# Patient Record
Sex: FEMALE | Employment: UNEMPLOYED | URBAN - METROPOLITAN AREA
[De-identification: names, ages, dates, MRNs, and addresses within clinical notes are randomized per-mention and may not be internally consistent; named-entity substitution may affect disease eponyms.]

---

## 2018-05-14 ENCOUNTER — HISTORICAL (OUTPATIENT)
Dept: ADMINISTRATIVE | Facility: HOSPITAL | Age: 48
End: 2018-05-14

## 2022-04-10 ENCOUNTER — HISTORICAL (OUTPATIENT)
Dept: ADMINISTRATIVE | Facility: HOSPITAL | Age: 52
End: 2022-04-10

## 2022-04-27 VITALS — WEIGHT: 92.81 LBS | BODY MASS INDEX: 18.22 KG/M2 | HEIGHT: 60 IN

## 2022-05-03 NOTE — HISTORICAL [facility]ER
This is a historical note converted from Bernard. Formatting and pictures may have been removed.  Please reference Bernard for original formatting and attached multimedia. Chief Complaint  Referral for R dist thumb fx  History of Present Illness  47-year-old right-hand-dominant female coming today for evaluation of her right thumb. ?She was moving boxes 2 weeks ago and sustained a crush injury to her distal thumb. ?She has a distal phalanx fracture. ?She was placed to a splint she took it off because it is getting very dirty.  ?  Of note she has rheumatoid arthritis and she has significant right wrist pain as well.? She underwent?wrist arthroscopy in the past and they just did an extensive debridement. ?She worked said that worked for a while but ultimately she is getting some significant pain back now. ?She is just inquiring on what her potential options were.  Review of Systems  Constitutional: No fevers, no chills, no night sweats,  Respiratory: Normal work of breathing  Cardiovascular: No chest pains  Gastrointestinal: no nausea, no vomiting  Musculoskeletal: See HPI  Integumentary: No rashes  Neurologic: Normal, atraumatic  Physical Exam  Vitals & Measurements  HT:?152?cm? HT:?152?cm? WT:?42.1?kg? WT:?42.1?kg? BMI:?18.22?  General: A+Ox3, NAD  Neck: No JVD  Respiratory: Normal work of breathing, Symmetric chest rise?  Cardiovascular: Regular rate and rhythm at radial pulse  Gastrointestinal: Soft, nontender, nondistended  Neurologic: CN II-XII intact  Musculoskeletal:  ?  RUE  Tenderness palpation of the distal phalanx of her right thumb  Full range of motion of remainder of the thumb  Limited wrist range of motion?to?20? flexion 15? extension about 40? supination 40? pronation  Neurovascular intact distally  Assessment/Plan  Finger fracture  Ordered:  Clinic Follow up, *Est. 06/11/18 3:00:00 CDT, Order for future visit, Finger fracture, [facility]  XR Hand Thumb Right Min 2 Views, Routine, *Est. 06/11/18  3:00:00 CDT, Follow Up Trauma, None, Ambulatory, Rad Type, Order for future visit, Finger fracture, *Est. 06/11/18 3:00:00 CDT  ?  Discussed all treatment on patient she expressed understanding. ?At this time we will place him into a splint for her thumb and she will come out daily for range of motion. ?She will be nonweightbearing for a period of 4 more weeks.? Additionally as far as her wrist is concerned at next visit we will attempt corticosteroid injection. ?After this I explained to her that her options really are wrist fusion versus arthroplasty and I think the convocation rate of arthroplasty is too high for us to consider that.? Therefore if her injections no longer work and she wants something done we can always do a total wrist fusion. ?She expressed understanding and is in agreement we will see her back in 4 weeks for repeat x-ray.   Problem List/Past Medical History  Ongoing  Arthritis  Tobacco user  Historical  No qualifying data  Procedure/Surgical History  Application of finger splint; dynamic (04/29/2018), Immobilization of Right Thumb using Splint (04/29/2018).  Medications  No active medications  Allergies  sulfa drugs?(blisters on tongue)  Social History  Alcohol  Current, 1-2 times per year, 04/29/2018  Substance Abuse  Never, 04/29/2018  Tobacco  Current every day smoker Use:. Cigarettes Type:. 10 per day. Started age 16 Years. Previous treatment: None. Ready to change: No. Household tobacco concerns: No., 05/14/2018  Current every day smoker, 04/29/2018  Diagnostic Results  Mildly comminuted?distal phalanx fracture of the thumb. ?Overall alignment is intact, joint line is intact and congruent  ?  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 ?As well as her midcarpal joint.      Gilma Edmonds?s?medical history, physical exam findings right hand, diagnosis, and treatment outlined by?.?Joshua?has been reviewed.??Treatment plan is determined to be reasonable and appropriate.?I was present  during the evaluation. X-rays right hand?reviewed. Smoking cessation is important.  ?

## 2024-04-06 DIAGNOSIS — S82.892A CLOSED FRACTURE DISLOCATION OF LEFT ANKLE, INITIAL ENCOUNTER: Primary | ICD-10-CM

## 2024-04-15 ENCOUNTER — HOSPITAL ENCOUNTER (EMERGENCY)
Facility: HOSPITAL | Age: 54
Discharge: HOME | End: 2024-04-15
Attending: INTERNAL MEDICINE
Payer: MEDICAID

## 2024-04-15 VITALS
HEART RATE: 94 BPM | SYSTOLIC BLOOD PRESSURE: 138 MMHG | DIASTOLIC BLOOD PRESSURE: 91 MMHG | TEMPERATURE: 98 F | RESPIRATION RATE: 20 BRPM | OXYGEN SATURATION: 99 % | BODY MASS INDEX: 21.91 KG/M2 | WEIGHT: 123.69 LBS | HEIGHT: 63 IN

## 2024-04-15 DIAGNOSIS — S82.842K CLOSED BIMALLEOLAR FRACTURE OF LEFT ANKLE WITH NONUNION, SUBSEQUENT ENCOUNTER: ICD-10-CM

## 2024-04-15 DIAGNOSIS — R74.8 ELEVATED LIVER ENZYMES: Primary | ICD-10-CM
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
## 2024-04-15 DIAGNOSIS — M79.662 PAIN AND SWELLING OF LEFT LOWER LEG: ICD-10-CM

## 2024-04-15 DIAGNOSIS — M79.89 PAIN AND SWELLING OF LEFT LOWER LEG: ICD-10-CM

## 2024-04-15 LAB
ALBUMIN SERPL-MCNC: 3.6 G/DL (ref 3.5–5)
ALBUMIN SERPL-MCNC: 3.9 G/DL (ref 3.5–5)
ALBUMIN/GLOB SERPL: 0.9 RATIO (ref 1.1–2)
ALBUMIN/GLOB SERPL: 1 RATIO (ref 1.1–2)
ALP SERPL-CCNC: 108 UNIT/L (ref 40–150)
ALP SERPL-CCNC: 120 UNIT/L (ref 40–150)
ALT SERPL-CCNC: 491 UNIT/L (ref 0–55)
ALT SERPL-CCNC: 493 UNIT/L (ref 0–55)
AMMONIA PLAS-MSCNC: 46.7 UMOL/L (ref 18–72)
APAP SERPL-MCNC: <17.4 UG/ML (ref 17.4–30)
APTT PPP: 33.6 SECONDS (ref 23.2–33.7)
AST SERPL-CCNC: 715 UNIT/L (ref 5–34)
AST SERPL-CCNC: 825 UNIT/L (ref 5–34)
BASOPHILS # BLD AUTO: 0.1 X10(3)/MCL
BASOPHILS NFR BLD AUTO: 0.8 %
BILIRUB SERPL-MCNC: 0.8 MG/DL
BILIRUB SERPL-MCNC: 1.2 MG/DL
BUN SERPL-MCNC: 14.6 MG/DL (ref 9.8–20.1)
BUN SERPL-MCNC: 17.5 MG/DL (ref 9.8–20.1)
CALCIUM SERPL-MCNC: 9.4 MG/DL (ref 8.4–10.2)
CALCIUM SERPL-MCNC: 9.9 MG/DL (ref 8.4–10.2)
CHLORIDE SERPL-SCNC: 106 MMOL/L (ref 98–107)
CHLORIDE SERPL-SCNC: 107 MMOL/L (ref 98–107)
CO2 SERPL-SCNC: 17 MMOL/L (ref 22–29)
CO2 SERPL-SCNC: 18 MMOL/L (ref 22–29)
CREAT SERPL-MCNC: 0.67 MG/DL (ref 0.55–1.02)
CREAT SERPL-MCNC: 0.72 MG/DL (ref 0.55–1.02)
CRP SERPL-MCNC: 31.2 MG/L
D DIMER PPP IA.FEU-MCNC: 1.85 UG/ML FEU (ref 0–0.5)
EOSINOPHIL # BLD AUTO: 0.33 X10(3)/MCL (ref 0–0.9)
EOSINOPHIL NFR BLD AUTO: 2.8 %
ERYTHROCYTE [DISTWIDTH] IN BLOOD BY AUTOMATED COUNT: 13.6 % (ref 11.5–17)
ERYTHROCYTE [SEDIMENTATION RATE] IN BLOOD: 18 MM/HR (ref 0–20)
GFR SERPLBLD CREATININE-BSD FMLA CKD-EPI: >60 MLS/MIN/1.73/M2
GFR SERPLBLD CREATININE-BSD FMLA CKD-EPI: >60 MLS/MIN/1.73/M2
GLOBULIN SER-MCNC: 3.8 GM/DL (ref 2.4–3.5)
GLOBULIN SER-MCNC: 4 GM/DL (ref 2.4–3.5)
GLUCOSE SERPL-MCNC: 110 MG/DL (ref 74–100)
GLUCOSE SERPL-MCNC: 140 MG/DL (ref 74–100)
HAV IGM SERPL QL IA: NONREACTIVE
HBV CORE IGM SERPL QL IA: NONREACTIVE
HBV SURFACE AG SERPL QL IA: NONREACTIVE
HCT VFR BLD AUTO: 45.6 % (ref 37–47)
HCV AB SERPL QL IA: NONREACTIVE
HGB BLD-MCNC: 16.3 G/DL (ref 12–16)
HOLD SPECIMEN: NORMAL
IMM GRANULOCYTES # BLD AUTO: 0.08 X10(3)/MCL (ref 0–0.04)
IMM GRANULOCYTES NFR BLD AUTO: 0.7 %
INR PPP: 1
LACTATE SERPL-SCNC: 0.8 MMOL/L (ref 0.5–2.2)
LIPASE SERPL-CCNC: 24 U/L
LYMPHOCYTES # BLD AUTO: 1.44 X10(3)/MCL (ref 0.6–4.6)
LYMPHOCYTES NFR BLD AUTO: 12.2 %
MCH RBC QN AUTO: 35.7 PG (ref 27–31)
MCHC RBC AUTO-ENTMCNC: 35.7 G/DL (ref 33–36)
MCV RBC AUTO: 99.8 FL (ref 80–94)
MONOCYTES # BLD AUTO: 0.74 X10(3)/MCL (ref 0.1–1.3)
MONOCYTES NFR BLD AUTO: 6.3 %
NEUTROPHILS # BLD AUTO: 9.09 X10(3)/MCL (ref 2.1–9.2)
NEUTROPHILS NFR BLD AUTO: 77.2 %
NRBC BLD AUTO-RTO: 0 %
PLATELET # BLD AUTO: 438 X10(3)/MCL (ref 130–400)
PMV BLD AUTO: 9.7 FL (ref 7.4–10.4)
POTASSIUM SERPL-SCNC: 3.8 MMOL/L (ref 3.5–5.1)
POTASSIUM SERPL-SCNC: 4 MMOL/L (ref 3.5–5.1)
PROT SERPL-MCNC: 7.6 GM/DL (ref 6.4–8.3)
PROT SERPL-MCNC: 7.7 GM/DL (ref 6.4–8.3)
PROTHROMBIN TIME: 12.7 SECONDS (ref 11.4–14)
RBC # BLD AUTO: 4.57 X10(6)/MCL (ref 4.2–5.4)
SALICYLATES SERPL-MCNC: <5 MG/DL (ref 15–30)
SODIUM SERPL-SCNC: 136 MMOL/L (ref 136–145)
SODIUM SERPL-SCNC: 137 MMOL/L (ref 136–145)
WBC # SPEC AUTO: 11.78 X10(3)/MCL (ref 4.5–11.5)

## 2024-04-15 PROCEDURE — 86140 C-REACTIVE PROTEIN: CPT | Performed by: PHYSICIAN ASSISTANT

## 2024-04-15 PROCEDURE — 80053 COMPREHEN METABOLIC PANEL: CPT | Mod: 91 | Performed by: INTERNAL MEDICINE

## 2024-04-15 PROCEDURE — 80053 COMPREHEN METABOLIC PANEL: CPT | Performed by: PHYSICIAN ASSISTANT

## 2024-04-15 PROCEDURE — 83605 ASSAY OF LACTIC ACID: CPT | Performed by: INTERNAL MEDICINE

## 2024-04-15 PROCEDURE — 85652 RBC SED RATE AUTOMATED: CPT | Performed by: PHYSICIAN ASSISTANT

## 2024-04-15 PROCEDURE — 83690 ASSAY OF LIPASE: CPT | Performed by: INTERNAL MEDICINE

## 2024-04-15 PROCEDURE — 80143 DRUG ASSAY ACETAMINOPHEN: CPT | Performed by: PHYSICIAN ASSISTANT

## 2024-04-15 PROCEDURE — 99284 EMERGENCY DEPT VISIT MOD MDM: CPT | Mod: 25

## 2024-04-15 PROCEDURE — 82140 ASSAY OF AMMONIA: CPT | Performed by: INTERNAL MEDICINE

## 2024-04-15 PROCEDURE — 25000003 PHARM REV CODE 250: Performed by: INTERNAL MEDICINE

## 2024-04-15 PROCEDURE — 85730 THROMBOPLASTIN TIME PARTIAL: CPT | Performed by: INTERNAL MEDICINE

## 2024-04-15 PROCEDURE — 80179 DRUG ASSAY SALICYLATE: CPT | Performed by: INTERNAL MEDICINE

## 2024-04-15 PROCEDURE — 85025 COMPLETE CBC W/AUTO DIFF WBC: CPT | Performed by: PHYSICIAN ASSISTANT

## 2024-04-15 PROCEDURE — 29515 APPLICATION SHORT LEG SPLINT: CPT | Mod: LT

## 2024-04-15 PROCEDURE — 25000003 PHARM REV CODE 250: Performed by: PHYSICIAN ASSISTANT

## 2024-04-15 PROCEDURE — 80074 ACUTE HEPATITIS PANEL: CPT | Performed by: PHYSICIAN ASSISTANT

## 2024-04-15 PROCEDURE — 63600175 PHARM REV CODE 636 W HCPCS: Performed by: INTERNAL MEDICINE

## 2024-04-15 PROCEDURE — 85610 PROTHROMBIN TIME: CPT | Performed by: INTERNAL MEDICINE

## 2024-04-15 PROCEDURE — 96365 THER/PROPH/DIAG IV INF INIT: CPT

## 2024-04-15 PROCEDURE — 85379 FIBRIN DEGRADATION QUANT: CPT | Performed by: PHYSICIAN ASSISTANT

## 2024-04-15 RX ORDER — OXYCODONE AND ACETAMINOPHEN 5; 325 MG/1; MG/1
1 TABLET ORAL
Status: COMPLETED | OUTPATIENT
Start: 2024-04-15 | End: 2024-04-15

## 2024-04-15 RX ORDER — OXYCODONE HYDROCHLORIDE 5 MG/1
5 TABLET ORAL EVERY 6 HOURS PRN
Qty: 12 TABLET | Refills: 0 | OUTPATIENT
Start: 2024-04-15 | End: 2024-04-30

## 2024-04-15 RX ORDER — OXYCODONE HYDROCHLORIDE 5 MG/1
5 TABLET ORAL
Status: COMPLETED | OUTPATIENT
Start: 2024-04-15 | End: 2024-04-15

## 2024-04-15 RX ADMIN — OXYCODONE HYDROCHLORIDE AND ACETAMINOPHEN 1 TABLET: 5; 325 TABLET ORAL at 05:04

## 2024-04-15 RX ADMIN — ACETYLCYSTEINE 8400 MG: 200 INJECTION, SOLUTION INTRAVENOUS at 07:04

## 2024-04-15 RX ADMIN — OXYCODONE HYDROCHLORIDE 5 MG: 5 TABLET ORAL at 10:04

## 2024-04-15 NOTE — ED PROVIDER NOTES
Encounter Date: 4/15/2024       History     Chief Complaint   Patient presents with    Foot Pain     Pt c/o foot pain after fall 1 week ago.  Seen at [facility]..  left leg in short splint.  Pt waiting on referral appointment from our ortho clinic.  Reports falling again with splint on 3 days ago.     52 YO WF in ER with complaints of continued left foot/ankle pain after she had a fracture/dislocation about 1 week ago at [facility] ER. She states 3 days ago she fell and put weight on the left leg and thinks she may have broken something else. She did remove her splint and put it back on. Has been taking a lot of Tylenol and ibuprofen along with her pain meds from the ER. States she is taking 2 Tylenol tablets every 2-2.5 hours. Also having pain behind her left knee. No other complaints.     The history is provided by the patient and a friend.     Review of patient's allergies indicates:   Allergen Reactions    Sulfamethoxazole-trimethoprim Hives     On tongue     No past medical history on file.  No past surgical history on file.  No family history on file.     Review of Systems   Constitutional:  Negative for chills and fever.   HENT:  Negative for congestion and sore throat.    Respiratory:  Negative for shortness of breath.    Cardiovascular:  Negative for chest pain.   Gastrointestinal:  Negative for abdominal pain, diarrhea, nausea and vomiting.   Genitourinary:  Negative for dysuria.   Musculoskeletal:  Positive for arthralgias, joint swelling and myalgias. Negative for back pain.   Skin:  Negative for rash.   Neurological:  Negative for dizziness, weakness, light-headedness and headaches.   Hematological:  Does not bruise/bleed easily.   All other systems reviewed and are negative.      Physical Exam     Initial Vitals [04/15/24 1552]   BP Pulse Resp Temp SpO2   114/68 (!) 118 18 97.8 °F (36.6 °C) 98 %      MAP       --         Physical Exam    Nursing note and vitals reviewed.  Constitutional: She appears well-developed  and well-nourished. She is not diaphoretic. No distress.   HENT:   Head: Normocephalic and atraumatic.   Mouth/Throat: Oropharynx is clear and moist.   Eyes: Conjunctivae are normal.   Neck: Neck supple. No JVD present.   Normal range of motion.  Cardiovascular:  Normal rate, regular rhythm and normal heart sounds.           Pulmonary/Chest: Breath sounds normal.   Musculoskeletal:      Cervical back: Normal range of motion and neck supple.      Left lower leg: Swelling and tenderness present. 1+ Pitting Edema present.      Comments: Significant bruising and swelling to LLE including foot, splint removed     Neurological: She is alert and oriented to person, place, and time. GCS score is 15. GCS eye subscore is 4. GCS verbal subscore is 5. GCS motor subscore is 6.   Skin: Skin is warm and dry.   Psychiatric: She has a normal mood and affect.         ED Course   Procedures  Labs Reviewed   COMPREHENSIVE METABOLIC PANEL - Abnormal; Notable for the following components:       Result Value    Carbon Dioxide 17 (*)     Glucose Level 110 (*)     Globulin 3.8 (*)     Albumin/Globulin Ratio 1.0 (*)     Alanine Aminotransferase 493 (*)     Aspartate Aminotransferase 825 (*)     All other components within normal limits   D DIMER, QUANTITATIVE - Abnormal; Notable for the following components:    D-Dimer 1.85 (*)     All other components within normal limits   CBC WITH DIFFERENTIAL - Abnormal; Notable for the following components:    WBC 11.78 (*)     Hgb 16.3 (*)     MCV 99.8 (*)     MCH 35.7 (*)     Platelet 438 (*)     IG# 0.08 (*)     All other components within normal limits   C-REACTIVE PROTEIN - Abnormal; Notable for the following components:    C-Reactive Protein 31.20 (*)     All other components within normal limits   ACETAMINOPHEN LEVEL - Abnormal; Notable for the following components:    Acetaminophen Level <17.4 (*)     All other components within normal limits   SALICYLATE LEVEL - Abnormal; Notable for the  following components:    Salicylate Level <5.0 (*)     All other components within normal limits   COMPREHENSIVE METABOLIC PANEL - Abnormal; Notable for the following components:    Carbon Dioxide 18 (*)     Glucose Level 140 (*)     Globulin 4.0 (*)     Albumin/Globulin Ratio 0.9 (*)     Alanine Aminotransferase 491 (*)     Aspartate Aminotransferase 715 (*)     All other components within normal limits   HEPATITIS PANEL, ACUTE - Normal   SEDIMENTATION RATE, AUTOMATED - Normal   PROTIME-INR - Normal   APTT - Normal   LIPASE - Normal   AMMONIA - Normal   LACTIC ACID, PLASMA - Normal   CBC W/ AUTO DIFFERENTIAL    Narrative:     The following orders were created for panel order CBC auto differential.  Procedure                               Abnormality         Status                     ---------                               -----------         ------                     CBC with Differential[[id number]]       Abnormal            Final result                 Please view results for these tests on the individual orders.   EXTRA TUBES    Narrative:     The following orders were created for panel order EXTRA TUBES.  Procedure                               Abnormality         Status                     ---------                               -----------         ------                     Gold Top Hold[[id number]]                                   Final result                 Please view results for these tests on the individual orders.   GOLD TOP HOLD   EXTRA TUBES    Narrative:     The following orders were created for panel order EXTRA TUBES.  Procedure                               Abnormality         Status                     ---------                               -----------         ------                     Light Blue Top Hold[[id number]]                             Final result               Light Green Top Hold[[id number]]                            Final result               Lavender Top Hold[[id number]]                                Final result               Gold Top Hold[[id number]]                                   Final result                 Please view results for these tests on the individual orders.   LIGHT BLUE TOP HOLD   LIGHT GREEN TOP HOLD   LAVENDER TOP HOLD   GOLD TOP HOLD          Imaging Results              X-Ray Ankle Complete Left (Final result)  Result time 04/15/24 17:43:00      Final result by Ezra Meeks MD (04/15/24 17:43:00)                   Impression:      Acute fracture of the distal tibial metaphysis extending to the medial malleolus and a fracture of the lateral malleolus. There is orthopedic hardware seen along the medial malleolus which is old.  The fractures are acute.      Electronically signed by: Monico Meeks  Date:    04/15/2024  Time:    17:43               Narrative:    EXAMINATION:  XR ANKLE COMPLETE 3 VIEW LEFT    CLINICAL HISTORY:  Unspecified fall, initial encounter    TECHNIQUE:  AP, lateral and oblique views were performed.    COMPARISON:  02/12/2015    FINDINGS:  There is an acute fracture of the distal tibial metaphysis extending to the medial malleolus with slight displacement seen. There are orthopedic screws the seen in this region from a old surgery that was also seen in 2015. The fractures are acute however.  There is also fracture of the lateral malleolus.  There is diffuse soft tissue swelling in the foot and ankle.  No other fractures are seen.                                       X-Ray Foot Complete Left (Final result)  Result time 04/15/24 17:41:56      Final result by Ezra Meeks MD (04/15/24 17:41:56)                   Impression:      Acute fracture of the distal tibial metaphysis extending to the medial malleolus and a fracture of the lateral malleolus.  There is orthopedic hardware seen along the medial malleolus which is old.  The fractures are acute.      Electronically signed by: Monico Meeks  Date:    04/15/2024  Time:    17:41                Narrative:    EXAMINATION:  XR FOOT COMPLETE 3 VIEW LEFT    CLINICAL HISTORY:  .  Unspecified fall, initial encounter    TECHNIQUE:  AP, lateral and oblique views of the left foot were performed.    COMPARISON:  02/12/2015    FINDINGS:  There is an acute fracture of the distal tibial metaphysis extending to the medial malleolus with slight displacement seen.  There are orthopedic screws the seen in this region from a old surgery that was also seen in 2015.  The fractures are acute however.  There is also fracture of the lateral malleolus.  There is diffuse soft tissue swelling in the foot and ankle.  No other fractures are seen.                                       X-Ray Tibia Fibula 2 View Left (Final result)  Result time 04/15/24 17:42:25      Final result by Ezra Meeks MD (04/15/24 17:42:25)                   Impression:      Acute fracture of the distal tibial metaphysis extending to the medial malleolus and a fracture of the lateral malleolus. There is orthopedic hardware seen along the medial malleolus which is old.  The fractures are acute.      Electronically signed by: Monico Meeks  Date:    04/15/2024  Time:    17:42               Narrative:    EXAMINATION:  XR TIBIA FIBULA 2 VIEW LEFT    CLINICAL HISTORY:  Unspecified fall, initial encounter    TECHNIQUE:  AP and lateral views of the left tibia and fibula were performed.    COMPARISON:  02/12/2015    FINDINGS:  There is an acute fracture of the distal tibial metaphysis extending to the medial malleolus with slight displacement seen. There are orthopedic screws the seen in this region from a old surgery that was also seen in 2015. The fractures are acute however.  There is also fracture of the lateral malleolus.  There is diffuse soft tissue swelling in the foot and ankle.  No other fractures are seen.                                       Medications   oxyCODONE-acetaminophen 5-325 mg per tablet 1 tablet (1 tablet Oral Given  4/15/24 1754)   acetylcysteine 20% (200 mg/ml) (ACETADOTE) 8,400 mg in dextrose 5 % (D5W) 250 mL infusion (0 mg Intravenous Stopped 4/15/24 2043)   oxyCODONE immediate release tablet 5 mg (5 mg Oral Given 4/15/24 2234)     Medical Decision Making  Amount and/or Complexity of Data Reviewed  External Data Reviewed: radiology.     Details: IMPRESSION:  An oblique nondisplaced fracture of the lateral malleolus is demonstrated.  The patient is status post ORIF of the distal medial tibial fracture. A vertical fracture line is demonstrated just medial to the tips of the fixation screws. The screws do not completely span across fracture line.    Electronically signed by: Fadi Delgado MD on 04/07/2024 02:47:28 AM /Eastern  Per PQRS, all CT exams are performed using one or more of the following dose reduction techniques: automated exposure control, adjustment of the mA and/or kV according to patient size, or use of iterative reconstruction technique.  Exam End: 04/07/24 01:22 Last Resulted: 04/07/24 01:47  [facility]  Result Received: 04/15/24 15:46      Labs: ordered. Decision-making details documented in ED Course.  Radiology: ordered and independent interpretation performed. Decision-making details documented in ED Course.    Risk  Prescription drug management.      Additional MDM:   Differential Diagnosis:   Differential diagnosis includes fracture, sprain, strain, contusion among others               Attending Attestation:     Physician Attestation Statement for NP/PA:   I personally made/approved the management plan and take responsibility for the patient management.    Other NP/PA Attestation Additions:    History of Present Illness: Presents with left ankle pain after a fall with resulting fracture. Evaluated at [facility] and referred to us due to health insurance difficulties. Pt states prior fracture with 2 screws  in it.   Upon HPI pt reports using Tylenol more frequently than recommended, also has history of frequent alcohol use.    Physical Exam: Left ankle swelling with bruising, N-V intact. Old splint removed and replaced. CRF < 2 sec.   Medical Decision Making: Transaminases noticed in a ration 2:1; Tylenol level within normal level as well as lactic acid and ammonia. Repeat liver profile w/o significant change. With the initial abnormal liver profile and before tylenol level reported a dose of Mucomyst was given assuming tylenol toxicity but upon further investigation and results this is more consistent with alcoholic liver disease. Pt asymptomatic, no nausea, vomiting or confusion.  Instructions to discontinue tylenol, decrease alcohol intake and adequate follow up with gastroenterology and internal medicine services given, adequate referrals submitted to F/U liver disease. Adequate Ortho referral also placed. Strict ED precautions provided.             ED Course as of 04/15/24 2255   Mon Apr 15, 2024   1900 Liver enzymes elevated, more than likely due to Tylenol intake, pt states she also has a hx of heavy alcohol use about 2 weeks ago, case discussed with Dr. Carlin and will workup for tylenol overdose, coag labs ordered, pt aware, care will be transitioned to Dr. Carlin at this time [TT]      ED Course User Index  [TT] Vj Harden PA                             Clinical Impression:  Final diagnoses:  [W19.XXXA] Fall  [M79.662, M79.89] Pain and swelling of left lower leg  [R74.8] Elevated liver enzymes (Primary)  [S82.842K] Closed bimalleolar fracture of left ankle with nonunion, subsequent encounter          ED Disposition Condition    Discharge Fair          ED Prescriptions       Medication Sig Dispense Start Date End Date Auth. Provider    oxyCODONE (ROXICODONE) 5 MG immediate release tablet Take 1 tablet (5 mg total) by mouth every 6 (six) hours as needed for Pain. 12 tablet 4/15/2024 -- Hugo Patrick  MD ANKUR          Follow-up Information       Follow up With Specialties Details Why Contact Info Additional Information    [facility] Dept Emergency Medicine  If symptoms worsen [address] [address] [phone]     [facility] G[facility] Gastroenterology Schedule an appointment as soon as possible for a visit in 1 month  [address] [address] [phone] Entrance 1 for clinic visits If your appointment is a fibroscan, please present to GI Lab on 5th Floor.     [facility] Internal Medicine Internal Medicine Schedule an appointment as soon as possible for a visit in 1 month  [address] [facility] [address] [phone] Internal Medicine Clinic Entrance #1    [facility] Orth[facility] Orthopedics Schedule an appointment as soon as possible for a visit in 2 weeks  [address] [facility] [id number] [phone]              Hugo Patrick MD  04/15/24 2255       Hugo Patrick MD  04/15/24 225

## 2024-04-16 NOTE — PROGRESS NOTES
[facility]  NEW Patient Office Visit  04/16/2024       Patient ID: Gilma Edmonds  YOB: 1970  MRN: [id number]    Chief Complaint: No chief complaint on file.      Orthopaedic Injuries:   Remote hx L ankle medial mal fx w/ retained hardware   L pilon fracture dislocation, associated distal fibula fx (DOI 4/6/23)    Orthopaedic Surgeries: pending    HPI:  Gilma Edmonds is a 53 y.o. female with above. She presents as a new patient.  Trip and fall.  She has a 1 pack per day current smoker.  Medial malleolus hardware is from 2014, outside hospital.    12 point ROS performed and negative except as above.     Past Medical History:    No past medical history on file.  No past surgical history on file.  No family history on file.  Social History     Socioeconomic History    Marital status: Single     Medication List with Changes/Refills   Current Medications    OXYCODONE (ROXICODONE) 5 MG IMMEDIATE RELEASE TABLET    Take 1 tablet (5 mg total) by mouth every 6 (six) hours as needed for Pain.     Review of patient's allergies indicates:   Allergen Reactions    Sulfamethoxazole-trimethoprim Hives     On tongue       Physical Exam:  Left lower extremity  Swollen, no skin wrinkles  Resolving bruising  She has a posterior fracture blister  Fires TA/GS/EHL/FHL  Toes well perfused    Imaging independently interpreted:  X-ray left ankle:  She is retained 2 medial malleolus screws, there is a large vertically oriented medial mal fracture that extends into the plafond, distal fibula fracture    Assessment and Plan:    Gilma Edmonds is a 53 y.o. female with left pilon fracture, retained orthopedic hardware.    -too swollen currently, we will book for ORIF left ankle, left ankle hardware removal, we will plan to stress the syndesmosis, possible syndesmosis fixation-04/30/2024  -given that she is taken her splint off previously she was placed into a short-leg cast today  -smoking  cessation    WB Status:  Nonweightbearing left lower extremity  Follow up:  Surgery 4/30    Sherrill Zelaya MD  [employer] [facility] PGY3

## 2024-04-16 NOTE — H&P (VIEW-ONLY)
[facility]  NEW Patient Office Visit  04/16/2024       Patient ID: Gilma Edmonds  YOB: 1970  MRN: [id number]    Chief Complaint: No chief complaint on file.      Orthopaedic Injuries:   Remote hx L ankle medial mal fx w/ retained hardware   L pilon fracture dislocation, associated distal fibula fx (DOI 4/6/23)    Orthopaedic Surgeries: pending    HPI:  Gilma Edmonds is a 53 y.o. female with above. She presents as a new patient.  Trip and fall.  She has a 1 pack per day current smoker.  Medial malleolus hardware is from 2014, outside hospital.    12 point ROS performed and negative except as above.     Past Medical History:    No past medical history on file.  No past surgical history on file.  No family history on file.  Social History     Socioeconomic History    Marital status: Single     Medication List with Changes/Refills   Current Medications    OXYCODONE (ROXICODONE) 5 MG IMMEDIATE RELEASE TABLET    Take 1 tablet (5 mg total) by mouth every 6 (six) hours as needed for Pain.     Review of patient's allergies indicates:   Allergen Reactions    Sulfamethoxazole-trimethoprim Hives     On tongue       Physical Exam:  Left lower extremity  Swollen, no skin wrinkles  Resolving bruising  She has a posterior fracture blister  Fires TA/GS/EHL/FHL  Toes well perfused    Imaging independently interpreted:  X-ray left ankle:  She is retained 2 medial malleolus screws, there is a large vertically oriented medial mal fracture that extends into the plafond, distal fibula fracture    Assessment and Plan:    Gilma Edmonds is a 53 y.o. female with left pilon fracture, retained orthopedic hardware.    -too swollen currently, we will book for ORIF left ankle, left ankle hardware removal, we will plan to stress the syndesmosis, possible syndesmosis fixation-04/30/2024  -given that she is taken her splint off previously she was placed into a short-leg cast today  -smoking  cessation    WB Status:  Nonweightbearing left lower extremity  Follow up:  Surgery 4/30    Sherrill Zelaya MD  [employer] [facility] PGY3

## 2024-04-17 ENCOUNTER — OFFICE VISIT (OUTPATIENT)
Dept: ORTHOPEDICS | Facility: CLINIC | Age: 54
End: 2024-04-17
Payer: MEDICAID

## 2024-04-17 ENCOUNTER — HOSPITAL ENCOUNTER (OUTPATIENT)
Dept: RADIOLOGY | Facility: HOSPITAL | Age: 54
Discharge: HOME | End: 2024-04-17
Attending: INTERNAL MEDICINE
Payer: MEDICAID

## 2024-04-17 ENCOUNTER — HOSPITAL ENCOUNTER (OUTPATIENT)
Dept: RADIOLOGY | Facility: HOSPITAL | Age: 54
Discharge: HOME | End: 2024-04-17
Attending: STUDENT IN AN ORGANIZED HEALTH CARE EDUCATION/TRAINING PROGRAM
Payer: MEDICAID

## 2024-04-17 VITALS
SYSTOLIC BLOOD PRESSURE: 114 MMHG | DIASTOLIC BLOOD PRESSURE: 77 MMHG | HEIGHT: 63 IN | WEIGHT: 126 LBS | HEART RATE: 94 BPM | BODY MASS INDEX: 22.32 KG/M2

## 2024-04-17 DIAGNOSIS — S82.872A CLOSED DISPLACED PILON FRACTURE OF LEFT TIBIA, INITIAL ENCOUNTER: ICD-10-CM

## 2024-04-17 DIAGNOSIS — S82.842K CLOSED BIMALLEOLAR FRACTURE OF LEFT ANKLE WITH NONUNION, SUBSEQUENT ENCOUNTER: ICD-10-CM

## 2024-04-17 DIAGNOSIS — R74.8 ELEVATED LIVER ENZYMES: ICD-10-CM

## 2024-04-17 DIAGNOSIS — S82.842K CLOSED BIMALLEOLAR FRACTURE OF LEFT ANKLE WITH NONUNION, SUBSEQUENT ENCOUNTER: Primary | ICD-10-CM

## 2024-04-17 PROCEDURE — 99204 OFFICE O/P NEW MOD 45 MIN: CPT | Mod: S$PBB,,, | Performed by: ORTHOPAEDIC SURGERY

## 2024-04-17 PROCEDURE — 99213 OFFICE O/P EST LOW 20 MIN: CPT | Mod: PBBFAC,25

## 2024-04-17 PROCEDURE — 76705 ECHO EXAM OF ABDOMEN: CPT | Mod: TC

## 2024-04-17 PROCEDURE — 73610 X-RAY EXAM OF ANKLE: CPT | Mod: TC,LT

## 2024-04-17 RX ORDER — MUPIROCIN 20 MG/G
OINTMENT TOPICAL
Status: CANCELLED | OUTPATIENT
Start: 2024-04-17

## 2024-04-17 RX ORDER — GABAPENTIN 300 MG/1
300 CAPSULE ORAL 3 TIMES DAILY
Qty: 90 CAPSULE | Refills: 11 | OUTPATIENT
Start: 2024-04-17 | End: 2024-04-30

## 2024-04-17 RX ORDER — OXYCODONE HYDROCHLORIDE 5 MG/1
5 TABLET ORAL EVERY 8 HOURS PRN
Qty: 21 TABLET | Refills: 0 | Status: SHIPPED | OUTPATIENT
Start: 2024-04-17 | End: 2024-04-24 | Stop reason: SDUPTHER

## 2024-04-17 RX ORDER — SODIUM CHLORIDE 9 MG/ML
INJECTION, SOLUTION INTRAVENOUS CONTINUOUS
Status: CANCELLED | OUTPATIENT
Start: 2024-04-17

## 2024-04-17 NOTE — PROGRESS NOTES
Faculty Attestation: Gilma Edmonds  was seen at [facility] in the Orthopaedic Clinic. Patient seen and evaluated at the time of the visit. History of Present Illness, Physical Exam, and Assessment and Plan reviewed. Treatment plan is reasonable and appropriate. Compliance with treatment recommendations is important. No procedure was performed.  We will plan for surgical intervention when soft tissues allow.      Markell King MD  Orthopaedic Surgery

## 2024-04-19 ENCOUNTER — ANESTHESIA EVENT (OUTPATIENT)
Dept: SURGERY | Facility: HOSPITAL | Age: 54
End: 2024-04-19
Payer: MEDICAID

## 2024-04-19 NOTE — ANESTHESIA PREPROCEDURE EVALUATION
Gilma Edmonds is a 53 y.o. female PRESENTING FOR ORIF, FRACTURE, PILON (Left: Leg Upper) with a history of   -FX L ANKLE/L PILON FX AFTER FALL 4/6/24    -SMOKER  -SIGNIFICANTLY ELEVATED LFTS 4/15/24 INITIALLY THOUGHT TO BE INDUCED BY EXCESSIVE TYLENOL INTAKE, BUT LATER ATTRIBUTED TO ETOH USE  -ETOH ABUSE  -FATTY LIVER    BETA-BLOCKER: NONE    New Orders for Anesthesia: CMP, PT/INR    Active Ambulatory Problems     Diagnosis Date Noted    No Active Ambulatory Problems     Resolved Ambulatory Problems     Diagnosis Date Noted    No Resolved Ambulatory Problems     Past Medical History:   Diagnosis Date    Arthritis      Pre-op Assessment    I have reviewed the NPO Status.      Review of Systems  Anesthesia Hx:  No problems with previous Anesthesia                Social:  Smoker, Alcohol Use       Cardiovascular:  Cardiovascular Normal                                            Pulmonary:  Pulmonary Normal                       Renal/:  Renal/ Normal                 Hepatic/GI:      Liver Disease,            Neurological:  Neurology Normal                                      Endocrine:  Endocrine Normal              Vitals:    04/30/24 0837 04/30/24 0841 04/30/24 0935 04/30/24 1043   BP:  123/86  122/72   Pulse:  93  88   Resp:   20 20   Temp:  36.3 °C (97.4 °F)  36 °C (96.8 °F)   TempSrc:  Oral  Temporal   SpO2:  96%  98%   Weight: 53.3 kg (117 lb 9.6 oz)            Physical Exam  General: Alert, Cooperative and Well nourished    Airway:  Mallampati: II   Mouth Opening: Normal  TM Distance: Normal  Tongue: Normal  Neck ROM: Normal ROM    Dental:  Intact    Chest/Lungs:  Normal Respiratory Rate, Clear to auscultation    Heart:  Rate: Normal  Rhythm: Regular Rhythm  Sounds: Normal      Lab Results   Component Value Date    WBC 11.78 (H) 04/15/2024    HGB 16.3 (H) 04/15/2024    HCT 45.6 04/15/2024    MCV 99.8 (H) 04/15/2024     (H) 04/15/2024     CMP  Sodium Level   Date Value Ref Range Status   04/30/2024  139 136 - 145 mmol/L Final     Potassium Level   Date Value Ref Range Status   04/30/2024 3.8 3.5 - 5.1 mmol/L Final     Carbon Dioxide   Date Value Ref Range Status   04/30/2024 20 (L) 22 - 29 mmol/L Final     Blood Urea Nitrogen   Date Value Ref Range Status   04/30/2024 14.3 9.8 - 20.1 mg/dL Final     Creatinine   Date Value Ref Range Status   04/30/2024 0.76 0.55 - 1.02 mg/dL Final     Calcium Level Total   Date Value Ref Range Status   04/30/2024 10.1 8.4 - 10.2 mg/dL Final     Albumin Level   Date Value Ref Range Status   04/30/2024 3.9 3.5 - 5.0 g/dL Final     Bilirubin Total   Date Value Ref Range Status   04/30/2024 0.4 <=1.5 mg/dL Final     Alkaline Phosphatase   Date Value Ref Range Status   04/30/2024 102 40 - 150 unit/L Final     Aspartate Aminotransferase   Date Value Ref Range Status   04/30/2024 25 5 - 34 unit/L Final     Alanine Aminotransferase   Date Value Ref Range Status   04/30/2024 27 0 - 55 unit/L Final     eGFR   Date Value Ref Range Status   04/30/2024 >60 mls/min/1.73/m2 Final     Lab Results   Component Value Date    INR 1.0 04/30/2024    INR 1.0 04/15/2024    PROTIME 12.8 04/30/2024    PROTIME 12.7 04/15/2024             Anesthesia Plan  Type of Anesthesia, risks & benefits discussed:    Anesthesia Type: Gen Supraglottic Airway, Regional  Intra-op Monitoring Plan: Standard ASA Monitors  Post Op Pain Control Plan: IV/PO Opioids PRN  Induction:  IV  Airway Plan: Direct  Informed Consent: Informed consent signed with the Patient and all parties understand the risks and agree with anesthesia plan.  All questions answered.   ASA Score: 3  Day of Surgery Review of History & Physical: H&P Update referred to the surgeon/provider.    Ready For Surgery From Anesthesia Perspective.     .

## 2024-04-24 ENCOUNTER — TELEPHONE (OUTPATIENT)
Dept: ORTHOPEDICS | Facility: CLINIC | Age: 54
End: 2024-04-24
Payer: MEDICAID

## 2024-04-24 DIAGNOSIS — S82.842K CLOSED BIMALLEOLAR FRACTURE OF LEFT ANKLE WITH NONUNION, SUBSEQUENT ENCOUNTER: ICD-10-CM

## 2024-04-24 RX ORDER — OXYCODONE HYDROCHLORIDE 5 MG/1
5 TABLET ORAL EVERY 12 HOURS PRN
Qty: 10 TABLET | Refills: 0 | OUTPATIENT
Start: 2024-04-24 | End: 2024-04-30

## 2024-04-24 NOTE — TELEPHONE ENCOUNTER
Patient calling asking for a refill of pain medication     Can you please send it to [facility]

## 2024-04-30 ENCOUNTER — ANESTHESIA (OUTPATIENT)
Dept: SURGERY | Facility: HOSPITAL | Age: 54
End: 2024-04-30
Payer: MEDICAID

## 2024-04-30 ENCOUNTER — HOSPITAL ENCOUNTER (OUTPATIENT)
Facility: HOSPITAL | Age: 54
Discharge: HOME | End: 2024-04-30
Attending: ORTHOPAEDIC SURGERY | Admitting: ORTHOPAEDIC SURGERY
Payer: MEDICAID

## 2024-04-30 VITALS
RESPIRATION RATE: 20 BRPM | WEIGHT: 117.63 LBS | HEART RATE: 83 BPM | BODY MASS INDEX: 20.83 KG/M2 | OXYGEN SATURATION: 96 % | DIASTOLIC BLOOD PRESSURE: 87 MMHG | TEMPERATURE: 98 F | SYSTOLIC BLOOD PRESSURE: 140 MMHG

## 2024-04-30 DIAGNOSIS — S82.872A CLOSED DISPLACED PILON FRACTURE OF LEFT TIBIA, INITIAL ENCOUNTER: ICD-10-CM

## 2024-04-30 DIAGNOSIS — S82.842K CLOSED BIMALLEOLAR FRACTURE OF LEFT ANKLE WITH NONUNION, SUBSEQUENT ENCOUNTER: ICD-10-CM

## 2024-04-30 LAB
ALBUMIN SERPL-MCNC: 3.9 G/DL (ref 3.5–5)
ALBUMIN/GLOB SERPL: 0.9 RATIO (ref 1.1–2)
ALP SERPL-CCNC: 102 UNIT/L (ref 40–150)
ALT SERPL-CCNC: 27 UNIT/L (ref 0–55)
AST SERPL-CCNC: 25 UNIT/L (ref 5–34)
BILIRUB SERPL-MCNC: 0.4 MG/DL
BUN SERPL-MCNC: 14.3 MG/DL (ref 9.8–20.1)
CALCIUM SERPL-MCNC: 10.1 MG/DL (ref 8.4–10.2)
CHLORIDE SERPL-SCNC: 107 MMOL/L (ref 98–107)
CO2 SERPL-SCNC: 20 MMOL/L (ref 22–29)
CREAT SERPL-MCNC: 0.76 MG/DL (ref 0.55–1.02)
GFR SERPLBLD CREATININE-BSD FMLA CKD-EPI: >60 MLS/MIN/1.73/M2
GLOBULIN SER-MCNC: 4.2 GM/DL (ref 2.4–3.5)
GLUCOSE SERPL-MCNC: 123 MG/DL (ref 74–100)
INR PPP: 1
POTASSIUM SERPL-SCNC: 3.8 MMOL/L (ref 3.5–5.1)
PROT SERPL-MCNC: 8.1 GM/DL (ref 6.4–8.3)
PROTHROMBIN TIME: 12.8 SECONDS (ref 11.4–14)
SODIUM SERPL-SCNC: 139 MMOL/L (ref 136–145)

## 2024-04-30 PROCEDURE — D9220A PRA ANESTHESIA: Mod: CRNA,,, | Performed by: NURSE ANESTHETIST, CERTIFIED REGISTERED

## 2024-04-30 PROCEDURE — 71000015 HC POSTOP RECOV 1ST HR: Performed by: SPECIALIST

## 2024-04-30 PROCEDURE — 63600175 PHARM REV CODE 636 W HCPCS: Performed by: NURSE ANESTHETIST, CERTIFIED REGISTERED

## 2024-04-30 PROCEDURE — 27827 TREAT LOWER LEG FRACTURE: CPT | Mod: LT,,, | Performed by: SPECIALIST

## 2024-04-30 PROCEDURE — 85610 PROTHROMBIN TIME: CPT | Performed by: NURSE PRACTITIONER

## 2024-04-30 PROCEDURE — 63600175 PHARM REV CODE 636 W HCPCS: Performed by: ANESTHESIOLOGY

## 2024-04-30 PROCEDURE — 25000003 PHARM REV CODE 250: Performed by: ANESTHESIOLOGY

## 2024-04-30 PROCEDURE — 76942 ECHO GUIDE FOR BIOPSY: CPT | Mod: 26,,, | Performed by: ANESTHESIOLOGY

## 2024-04-30 PROCEDURE — 36000711: Performed by: SPECIALIST

## 2024-04-30 PROCEDURE — 64445 NJX AA&/STRD SCIATIC NRV IMG: CPT | Performed by: ANESTHESIOLOGY

## 2024-04-30 PROCEDURE — 36000710: Performed by: SPECIALIST

## 2024-04-30 PROCEDURE — 37000008 HC ANESTHESIA 1ST 15 MINUTES: Performed by: SPECIALIST

## 2024-04-30 PROCEDURE — D9220A PRA ANESTHESIA: Mod: ANES,,, | Performed by: ANESTHESIOLOGY

## 2024-04-30 PROCEDURE — 25000003 PHARM REV CODE 250: Performed by: NURSE ANESTHETIST, CERTIFIED REGISTERED

## 2024-04-30 PROCEDURE — 71000016 HC POSTOP RECOV ADDL HR: Performed by: SPECIALIST

## 2024-04-30 PROCEDURE — 20680 REMOVAL OF IMPLANT DEEP: CPT | Mod: 51,,, | Performed by: SPECIALIST

## 2024-04-30 PROCEDURE — 63600175 PHARM REV CODE 636 W HCPCS

## 2024-04-30 PROCEDURE — 71000033 HC RECOVERY, INTIAL HOUR: Performed by: SPECIALIST

## 2024-04-30 PROCEDURE — 80053 COMPREHEN METABOLIC PANEL: CPT | Performed by: NURSE PRACTITIONER

## 2024-04-30 PROCEDURE — C1713 ANCHOR/SCREW BN/BN,TIS/BN: HCPCS | Performed by: SPECIALIST

## 2024-04-30 PROCEDURE — 37000009 HC ANESTHESIA EA ADD 15 MINS: Performed by: SPECIALIST

## 2024-04-30 PROCEDURE — 27201423 OPTIME MED/SURG SUP & DEVICES STERILE SUPPLY: Performed by: SPECIALIST

## 2024-04-30 PROCEDURE — 64450 NJX AA&/STRD OTHER PN/BRANCH: CPT | Mod: 59,LT,, | Performed by: ANESTHESIOLOGY

## 2024-04-30 DEVICE — SCREW BONE CORTICAL 3.5X26MM T: Type: IMPLANTABLE DEVICE | Site: ANKLE | Status: FUNCTIONAL

## 2024-04-30 DEVICE — SCREW BONE CORTICAL 3.5X24MM T: Type: IMPLANTABLE DEVICE | Site: ANKLE | Status: FUNCTIONAL

## 2024-04-30 DEVICE — SCREW AXSOS CANC FT TI 4X70MM: Type: IMPLANTABLE DEVICE | Site: ANKLE | Status: FUNCTIONAL

## 2024-04-30 DEVICE — SCREW BONE AXSOS 4X40MM TI: Type: IMPLANTABLE DEVICE | Site: ANKLE | Status: FUNCTIONAL

## 2024-04-30 DEVICE — IMPLANTABLE DEVICE: Type: IMPLANTABLE DEVICE | Site: ANKLE | Status: FUNCTIONAL

## 2024-04-30 RX ORDER — ONDANSETRON HYDROCHLORIDE 2 MG/ML
4 INJECTION, SOLUTION INTRAVENOUS DAILY PRN
Status: DISCONTINUED | OUTPATIENT
Start: 2024-04-30 | End: 2024-04-30 | Stop reason: HOSPADM

## 2024-04-30 RX ORDER — PHENYLEPHRINE HYDROCHLORIDE 10 MG/ML
INJECTION INTRAVENOUS
Status: DISCONTINUED | OUTPATIENT
Start: 2024-04-30 | End: 2024-04-30

## 2024-04-30 RX ORDER — LIDOCAINE HYDROCHLORIDE 20 MG/ML
INJECTION INTRAVENOUS
Status: DISCONTINUED | OUTPATIENT
Start: 2024-04-30 | End: 2024-04-30

## 2024-04-30 RX ORDER — ROPIVACAINE HYDROCHLORIDE 5 MG/ML
INJECTION, SOLUTION EPIDURAL; INFILTRATION; PERINEURAL
Status: COMPLETED | OUTPATIENT
Start: 2024-04-30 | End: 2024-04-30

## 2024-04-30 RX ORDER — CEFAZOLIN SODIUM 1 G/3ML
INJECTION, POWDER, FOR SOLUTION INTRAMUSCULAR; INTRAVENOUS
Status: DISCONTINUED | OUTPATIENT
Start: 2024-04-30 | End: 2024-04-30

## 2024-04-30 RX ORDER — PROMETHAZINE HYDROCHLORIDE 25 MG/ML
INJECTION, SOLUTION INTRAMUSCULAR; INTRAVENOUS
Status: COMPLETED
Start: 2024-04-30 | End: 2024-04-30

## 2024-04-30 RX ORDER — PROPOFOL 10 MG/ML
VIAL (ML) INTRAVENOUS
Status: DISCONTINUED | OUTPATIENT
Start: 2024-04-30 | End: 2024-04-30

## 2024-04-30 RX ORDER — ONDANSETRON 4 MG/1
4 TABLET, FILM COATED ORAL EVERY 8 HOURS PRN
Qty: 15 TABLET | Refills: 0 | Status: SHIPPED | OUTPATIENT
Start: 2024-04-30 | End: 2024-05-07

## 2024-04-30 RX ORDER — FENTANYL CITRATE 50 UG/ML
INJECTION, SOLUTION INTRAMUSCULAR; INTRAVENOUS
Status: DISCONTINUED | OUTPATIENT
Start: 2024-04-30 | End: 2024-04-30

## 2024-04-30 RX ORDER — KETOROLAC TROMETHAMINE 30 MG/ML
INJECTION, SOLUTION INTRAMUSCULAR; INTRAVENOUS
Status: DISCONTINUED | OUTPATIENT
Start: 2024-04-30 | End: 2024-04-30

## 2024-04-30 RX ORDER — DEXAMETHASONE SODIUM PHOSPHATE 4 MG/ML
INJECTION, SOLUTION INTRA-ARTICULAR; INTRALESIONAL; INTRAMUSCULAR; INTRAVENOUS; SOFT TISSUE
Status: DISCONTINUED | OUTPATIENT
Start: 2024-04-30 | End: 2024-04-30

## 2024-04-30 RX ORDER — ACETAMINOPHEN 500 MG
500 TABLET ORAL EVERY 8 HOURS
Qty: 90 TABLET | Refills: 0 | Status: SHIPPED | OUTPATIENT
Start: 2024-04-30 | End: 2024-04-30 | Stop reason: HOSPADM

## 2024-04-30 RX ORDER — MELOXICAM 15 MG/1
15 TABLET ORAL DAILY
Qty: 14 TABLET | Refills: 0 | Status: SHIPPED | OUTPATIENT
Start: 2024-04-30 | End: 2024-05-29

## 2024-04-30 RX ORDER — NAPROXEN SODIUM 220 MG/1
81 TABLET, FILM COATED ORAL 2 TIMES DAILY
Qty: 84 TABLET | Refills: 0 | Status: SHIPPED | OUTPATIENT
Start: 2024-04-30 | End: 2024-06-11

## 2024-04-30 RX ORDER — OXYCODONE HYDROCHLORIDE 5 MG/1
5 TABLET ORAL
Status: DISCONTINUED | OUTPATIENT
Start: 2024-04-30 | End: 2024-04-30 | Stop reason: HOSPADM

## 2024-04-30 RX ORDER — PROMETHAZINE HYDROCHLORIDE 25 MG/ML
12.5 INJECTION, SOLUTION INTRAMUSCULAR; INTRAVENOUS ONCE
Status: COMPLETED | OUTPATIENT
Start: 2024-04-30 | End: 2024-04-30

## 2024-04-30 RX ORDER — OXYCODONE HYDROCHLORIDE 5 MG/1
5 TABLET ORAL EVERY 6 HOURS PRN
Status: COMPLETED | OUTPATIENT
Start: 2024-04-30 | End: 2024-04-30

## 2024-04-30 RX ORDER — SODIUM CHLORIDE, SODIUM LACTATE, POTASSIUM CHLORIDE, CALCIUM CHLORIDE 600; 310; 30; 20 MG/100ML; MG/100ML; MG/100ML; MG/100ML
INJECTION, SOLUTION INTRAVENOUS CONTINUOUS
Status: ACTIVE | OUTPATIENT
Start: 2024-04-30

## 2024-04-30 RX ORDER — MORPHINE SULFATE 2 MG/ML
2 INJECTION, SOLUTION INTRAMUSCULAR; INTRAVENOUS EVERY 5 MIN PRN
Status: DISCONTINUED | OUTPATIENT
Start: 2024-04-30 | End: 2024-04-30 | Stop reason: HOSPADM

## 2024-04-30 RX ORDER — SODIUM CHLORIDE 9 MG/ML
INJECTION, SOLUTION INTRAVENOUS CONTINUOUS
Status: DISCONTINUED | OUTPATIENT
Start: 2024-04-30 | End: 2024-04-30 | Stop reason: HOSPADM

## 2024-04-30 RX ORDER — OXYCODONE HYDROCHLORIDE 5 MG/1
10 TABLET ORAL EVERY 4 HOURS PRN
Qty: 56 TABLET | Refills: 0 | Status: SHIPPED | OUTPATIENT
Start: 2024-04-30 | End: 2024-05-10 | Stop reason: SDUPTHER

## 2024-04-30 RX ORDER — SODIUM CHLORIDE 0.9 % (FLUSH) 0.9 %
10 SYRINGE (ML) INJECTION
Status: DISCONTINUED | OUTPATIENT
Start: 2024-04-30 | End: 2024-04-30 | Stop reason: HOSPADM

## 2024-04-30 RX ORDER — EPHEDRINE SULFATE 50 MG/ML
INJECTION, SOLUTION INTRAVENOUS
Status: DISCONTINUED | OUTPATIENT
Start: 2024-04-30 | End: 2024-04-30

## 2024-04-30 RX ORDER — MIDAZOLAM HYDROCHLORIDE 2 MG/2ML
5 INJECTION, SOLUTION INTRAMUSCULAR; INTRAVENOUS ONCE AS NEEDED
Status: COMPLETED | OUTPATIENT
Start: 2024-04-30 | End: 2024-04-30

## 2024-04-30 RX ORDER — GABAPENTIN 300 MG/1
300 CAPSULE ORAL 3 TIMES DAILY
Qty: 90 CAPSULE | Refills: 0 | Status: SHIPPED | OUTPATIENT
Start: 2024-04-30 | End: 2025-04-30

## 2024-04-30 RX ORDER — PANTOPRAZOLE SODIUM 40 MG/1
40 TABLET, DELAYED RELEASE ORAL DAILY
Qty: 42 TABLET | Refills: 0 | Status: SHIPPED | OUTPATIENT
Start: 2024-04-30 | End: 2024-05-29

## 2024-04-30 RX ORDER — HYDROCODONE BITARTRATE AND ACETAMINOPHEN 10; 325 MG/1; MG/1
1 TABLET ORAL EVERY 8 HOURS PRN
Qty: 21 TABLET | Refills: 0 | Status: SHIPPED | OUTPATIENT
Start: 2024-04-30 | End: 2024-04-30 | Stop reason: HOSPADM

## 2024-04-30 RX ORDER — ONDANSETRON HYDROCHLORIDE 2 MG/ML
INJECTION, SOLUTION INTRAVENOUS
Status: DISCONTINUED | OUTPATIENT
Start: 2024-04-30 | End: 2024-04-30

## 2024-04-30 RX ORDER — MEPERIDINE HYDROCHLORIDE 25 MG/ML
12.5 INJECTION INTRAMUSCULAR; INTRAVENOUS; SUBCUTANEOUS EVERY 10 MIN PRN
Status: DISCONTINUED | OUTPATIENT
Start: 2024-04-30 | End: 2024-04-30 | Stop reason: HOSPADM

## 2024-04-30 RX ADMIN — LIDOCAINE HYDROCHLORIDE 50 MG: 20 INJECTION INTRAVENOUS at 12:04

## 2024-04-30 RX ADMIN — MEPERIDINE HYDROCHLORIDE 12.5 MG: 25 INJECTION INTRAMUSCULAR; INTRAVENOUS; SUBCUTANEOUS at 03:04

## 2024-04-30 RX ADMIN — OXYCODONE HYDROCHLORIDE 5 MG: 5 TABLET ORAL at 03:04

## 2024-04-30 RX ADMIN — PHENYLEPHRINE HYDROCHLORIDE 200 MCG: 10 INJECTION INTRAVENOUS at 12:04

## 2024-04-30 RX ADMIN — SODIUM CHLORIDE, POTASSIUM CHLORIDE, SODIUM LACTATE AND CALCIUM CHLORIDE: 600; 310; 30; 20 INJECTION, SOLUTION INTRAVENOUS at 10:04

## 2024-04-30 RX ADMIN — MORPHINE SULFATE 2 MG: 2 INJECTION, SOLUTION INTRAMUSCULAR; INTRAVENOUS at 03:04

## 2024-04-30 RX ADMIN — ROPIVACAINE HYDROCHLORIDE 30 ML: 5 INJECTION, SOLUTION EPIDURAL; INFILTRATION; PERINEURAL at 11:04

## 2024-04-30 RX ADMIN — PROPOFOL 150 MG: 10 INJECTION, EMULSION INTRAVENOUS at 12:04

## 2024-04-30 RX ADMIN — OXYCODONE HYDROCHLORIDE 5 MG: 5 TABLET ORAL at 09:04

## 2024-04-30 RX ADMIN — PHENYLEPHRINE HYDROCHLORIDE 100 MCG: 10 INJECTION INTRAVENOUS at 12:04

## 2024-04-30 RX ADMIN — PROMETHAZINE HYDROCHLORIDE 12.5 MG: 25 INJECTION, SOLUTION INTRAMUSCULAR; INTRAVENOUS at 03:04

## 2024-04-30 RX ADMIN — DEXAMETHASONE SODIUM PHOSPHATE 8 MG: 4 INJECTION, SOLUTION INTRA-ARTICULAR; INTRALESIONAL; INTRAMUSCULAR; INTRAVENOUS; SOFT TISSUE at 12:04

## 2024-04-30 RX ADMIN — FENTANYL CITRATE 50 MCG: 50 INJECTION INTRAMUSCULAR; INTRAVENOUS at 12:04

## 2024-04-30 RX ADMIN — KETOROLAC TROMETHAMINE 30 MG: 30 INJECTION, SOLUTION INTRAMUSCULAR at 02:04

## 2024-04-30 RX ADMIN — LIDOCAINE HYDROCHLORIDE 75 MG: 20 INJECTION INTRAVENOUS at 02:04

## 2024-04-30 RX ADMIN — EPHEDRINE SULFATE 20 MG: 50 INJECTION INTRAVENOUS at 01:04

## 2024-04-30 RX ADMIN — PROMETHAZINE HYDROCHLORIDE 12.5 MG: 25 INJECTION INTRAMUSCULAR; INTRAVENOUS at 03:04

## 2024-04-30 RX ADMIN — MIDAZOLAM HYDROCHLORIDE 4 MG: 1 INJECTION, SOLUTION INTRAMUSCULAR; INTRAVENOUS at 10:04

## 2024-04-30 RX ADMIN — CEFAZOLIN 2 G: 330 INJECTION, POWDER, FOR SOLUTION INTRAMUSCULAR; INTRAVENOUS at 12:04

## 2024-04-30 RX ADMIN — ONDANSETRON 4 MG: 2 INJECTION INTRAMUSCULAR; INTRAVENOUS at 02:04

## 2024-04-30 NOTE — ANESTHESIA POSTPROCEDURE EVALUATION
Anesthesia Post Evaluation    Patient: Gilma Edmonds    Procedure(s) Performed: Procedure(s) (LRB):  ORIF, FRACTURE, PILON (Left)    Final Anesthesia Type: general      Patient location during evaluation: [facility]  Post-procedure vital signs: reviewed and stable  Airway patency: patent      Anesthetic complications: no      Cardiovascular status: hemodynamically stable  Respiratory status: spontaneous ventilation  Follow-up not needed.              Vitals Value Taken Time   /79 04/30/24 1448   Temp 36.7 °C (98 °F) 04/30/24 1443   Pulse 77 04/30/24 1448   Resp 20 04/30/24 1448   SpO2 93 % 04/30/24 1448         No case tracking events are documented in the log.      Pain/Sydney Score: Pain Rating Prior to Med Admin: 8 (4/30/2024  9:35 AM)  Sydney Score: 5 (4/30/2024  2:43 PM)

## 2024-04-30 NOTE — PLAN OF CARE
Problem: Adult Inpatient Plan of Care  Goal: Plan of Care Review  4/30/2024 1733 by Conor Pennington RN  Outcome: Progressing  4/30/2024 1531 by Conor Pennington RN  Outcome: Progressing  Goal: Patient-Specific Goal (Individualized)  4/30/2024 1733 by Conor Pennington RN  Outcome: Progressing  4/30/2024 1531 by Conor Pennington RN  Outcome: Progressing  Goal: Absence of Hospital-Acquired Illness or Injury  4/30/2024 1733 by Conor Pennington RN  Outcome: Progressing  4/30/2024 1531 by Conor Pennington RN  Outcome: Progressing  Goal: Optimal Comfort and Wellbeing  4/30/2024 1733 by Conor Pennington RN  Outcome: Progressing  4/30/2024 1531 by Conor Pennington RN  Outcome: Progressing  Goal: Readiness for Transition of Care  4/30/2024 1733 by Conor Pennington RN  Outcome: Progressing  4/30/2024 1531 by Conor Pennington RN  Outcome: Progressing     Problem: Wound  Goal: Optimal Coping  4/30/2024 1733 by Conor Pennington RN  Outcome: Progressing  4/30/2024 1531 by Conor Pennington RN  Outcome: Progressing  Goal: Optimal Functional Ability  4/30/2024 1733 by Conor Pennington RN  Outcome: Progressing  4/30/2024 1531 by Conor Pennington RN  Outcome: Progressing  Goal: Absence of Infection Signs and Symptoms  4/30/2024 1733 by Conor Pennington RN  Outcome: Progressing  4/30/2024 1531 by Conor Pennington RN  Outcome: Progressing  Goal: Improved Oral Intake  4/30/2024 1733 by Conor ePnnington RN  Outcome: Progressing  4/30/2024 1531 by Conor Pennington RN  Outcome: Progressing  Goal: Optimal Pain Control and Function  4/30/2024 1733 by Conor Pennington RN  Outcome: Progressing  4/30/2024 1531 by Conor Pennington RN  Outcome: Progressing  Goal: Skin Health and Integrity  4/30/2024 1733 by Conor Pennington RN  Outcome: Progressing  4/30/2024 1531 by Conor Pennington RN  Outcome: Progressing  Goal: Optimal Wound Healing  4/30/2024 1733 by Conor Pennington, RN  Outcome: Progressing  4/30/2024 1531 by Conor Pennington, RN  Outcome: Progressing

## 2024-04-30 NOTE — ANESTHESIA PROCEDURE NOTES
Intubation    Date/Time: 4/30/2024 12:43 PM    Performed by: Dana Gr CRNA  Authorized by: Dana Gr CRNA    Intubation:     Induction:  Intravenous    Intubated:  Postinduction    Mask Ventilation:  Not attempted    Attempts:  1    Attempted By:  CRNA    Difficult Airway Encountered?: No      Complications:  None    Airway Device:  Supraglottic airway/LMA    Airway Device Size:  4.0    Style/Cuff Inflation:  Uncuffed    Placement Verified By:  Capnometry    Complicating Factors:  None    Findings Post-Intubation:  BS equal bilateral and atraumatic/condition of teeth unchanged

## 2024-04-30 NOTE — ANESTHESIA PROCEDURE NOTES
Peripheral Block    Patient location during procedure: pre-op   Block not for primary anesthetic.  Reason for block: at surgeon's request and post-op pain management   Post-op Pain Location: Lt ankle pain   Start time: 4/30/2024 11:21 AM  Timeout: 4/30/2024 11:21 AM   End time: 4/30/2024 11:26 AM    Staffing  Authorizing Provider: Evette Quintero MD  Performing Provider: Evette Quintero MD    Staffing  Performed by: Evette Quintero MD  Authorized by: Evette Quintero MD    Preanesthetic Checklist  Completed: patient identified, IV checked, site marked, risks and benefits discussed, surgical consent, monitors and equipment checked, pre-op evaluation and timeout performed  Peripheral Block  Patient position: supine  Prep: ChloraPrep  Patient monitoring: heart rate, cardiac monitor, continuous pulse ox, continuous capnometry and frequent blood pressure checks  Block type: popliteal  Laterality: left  Injection technique: single shot  Needle  Needle type: Stimuplex   Needle gauge: 21 G  Needle length: 4 in  Needle localization: anatomical landmarks and ultrasound guidance   -ultrasound image captured on disc.  Assessment  Injection assessment: negative aspiration, negative parasthesia and local visualized surrounding nerve  Paresthesia pain: none  Heart rate change: no  Slow fractionated injection: yes  Pain Tolerance: comfortable throughout block and no complaints  Medications:    Medications: ropivacaine (NAROPIN) injection 0.5% - Perineural   30 mL - 4/30/2024 11:21:00 AM    Additional Notes  VSS.  [facility] RN monitoring vitals throughout procedure.  Patient tolerated procedure well.

## 2024-04-30 NOTE — DISCHARGE INSTRUCTIONS
Ortho    · Keep follow up appointment at [facility].    ` Resume home medications unless otherwise instructed by MD    · Take medications as prescribed.      ` See included nerve block handout    · Keep leg elevated on pillow to decrease pain and swelling.    · No driving or consuming alcohol for the next 24 hours or while taking narcotic pain medicine.    · May apply ice pack to surgical area for 20 minutes at a time 6-8 times per day.    · Dressing: Leave clean and dry until follow up appointment.    · NO weight bearing on left leg until cleared by MD.      · Notify MD of any moderate to severe pain unrelieved by pain medicine or for any signs of infection including fever above 100.4, excessive redness or swelling, yellow/green foul- smelling drainage, nausea or vomiting. Call clinic at: [phone]. After business hours, if your concern is an emergency, call 911 or report to your nearest emergency room.    · Thanks for choosing [facility]! Have a smooth recovery!

## 2024-04-30 NOTE — BRIEF OP NOTE
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    DISPOSITION: Home or Self Care    FINAL DIAGNOSIS:  <principal problem not specified>    FOLLOWUP: In clinic    DISCHARGE INSTRUCTIONS:    Discharge Procedure Orders   Weight bearing restrictions (specify):   Order Comments: Gianluca ocasio

## 2024-04-30 NOTE — OP NOTE
[facility] Orthopaedic Surgery    Operative Note    Date of Service: 4/30/2024    Pre-Operative Diagnosis:   1. Left pilon fracture   2. Retained hardware left ankle    Post-Operative Diagnosis: Same    Procedure(s):   1. Hardware removal left ankle, deep implant  2. Open reduction internal fixation left pilon fracture    Anesthesia: General     Surgeon: MD Shamar, was present and scrubbed for the key portions of the procedure.     Assistant(s):  MD Mia Zelaya MD     Indications  Patient is a 53 y.o.female with remote history of L ankle ORIF with retained hardware who sustained a new acute left pilon fracture just distal to her previous hardware. The patient was checked again in the Holding Room. The risks, benefits, complications, treatment options, and expected outcomes were reviewed again with the patient. The patient has elected to proceed with above listed procedure(s).  The risks and potential complications include but are not limited to infection, nerve injury, vascular injury, persistent pain, potential skin necrosis, deep vein thrombosis, possible pulmonary embolus, complications of the anesthetics and failure of the implants with potential need for future surgery to remove or revise.  The patient concurred with the proposed plan, giving informed consent.  The site of surgery was identified by the patient and properly noted/marked by me.    Implant:   1. Mo medial distal tibial plate     Procedure Details:   The patient was taken to Operating Room #4.  A Time-Out was held and the patient, location and procedure(s) were verified and agreed upon by all members of the operating room staff.  Prophylacic antibiotics were given.The patient was given general anesthesia.     Following the successful induction of anesthesia the patient was placed supine. The left lower extremity was prepped and draped in normal sterile manner. A standard medial incision was made, extending down to the subcutaneous tissue.  Bleeders were identified, isolated, and cauterized.  A standard approach was made to the medial malleolus.  Previous hardware was encountered.  This was removed without difficulty.  A total of 2 screws and 2 washers were passed off the field.  We then extended our dissection proximally to approach the fracture apex as well as exposed enough of the distal tibia for placement of a Mo medial distal tibial plate.  We then turned our attention to the fracture site, there was some comminution at the apex of the fracture.  The fracture was mobilized using a combination of Harpster elevator and manipulation of the ankle and a provisional reduction was held in place with K-wires.  We then sized and placed a medial distal tibial plate that was held in place with K-wires and checked under fluoroscopy.  Once placement was satisfactory we placed 1 cortical screw proximal and distal to the fracture to suck the plate down to bone.  We then placed a combination of distal lockers and 3 cortical screws proximal that were bicortical.  A cancellous screw was used for the medial malleolus screw.  Screw position was checked both clinically and under fluoroscopy deemed to be satisfactory.    Given her soft tissue as well as multiple stress views decision was made to leave the distal fibula fracture.  Her syndesmosis did not widen.    2-0 vicryl was used to close the subcutaneous layer and 3-0 ethilon was used to close the skin.     A dry sterile dressing was applied using Adaptic, sterile gauze, cast padding and a splint, with loosely wrapped ace wrap.     Instrument, sponge, and needle counts were correct prior to wound closure and at the conclusion of the case.     The patient was awoken from anesthesia, tolerated the procedure well and taken to recovery in stable condition.       Findings:   1.  Above    Estimated blood loss:  100 cc    Drains:  None    Total IV fluids: per anesthesia records      Complications: None, pt tolerated  the procedure well    Disposition: Awakened from anesthesia, and taken to the recovery room in a stable condition, having suffered no apparent untoward event     Condition: Stable     Discharge Medications:    Pain medication      Sherrill Zelaya MD  [employer]

## 2024-04-30 NOTE — PLAN OF CARE
Pt tolerated procedure well, ready for discharge  Problem: Adult Inpatient Plan of Care  Goal: Plan of Care Review  4/30/2024 1734 by Conor Pennington RN  Outcome: Met  4/30/2024 1733 by Conor Pennington RN  Outcome: Progressing  4/30/2024 1531 by Conor Pennington RN  Outcome: Progressing  Goal: Patient-Specific Goal (Individualized)  4/30/2024 1734 by Conor Pennington RN  Outcome: Met  4/30/2024 1733 by Conor Pennington RN  Outcome: Progressing  4/30/2024 1531 by Conor Pennington RN  Outcome: Progressing  Goal: Absence of Hospital-Acquired Illness or Injury  4/30/2024 1734 by Conor Pennington RN  Outcome: Met  4/30/2024 1733 by Conor Pennington RN  Outcome: Progressing  4/30/2024 1531 by Conor Pennington RN  Outcome: Progressing  Goal: Optimal Comfort and Wellbeing  4/30/2024 1734 by Conor Pennington RN  Outcome: Met  4/30/2024 1733 by Conor Pennington RN  Outcome: Progressing  4/30/2024 1531 by Conor Pennington RN  Outcome: Progressing  Goal: Readiness for Transition of Care  4/30/2024 1734 by Conor Pennington RN  Outcome: Met  4/30/2024 1733 by Conor Pennington RN  Outcome: Progressing  4/30/2024 1531 by Conor Pennington RN  Outcome: Progressing     Problem: Wound  Goal: Optimal Coping  4/30/2024 1734 by Conor Pennington RN  Outcome: Met  4/30/2024 1733 by Conor Pennington RN  Outcome: Progressing  4/30/2024 1531 by Conor Pennington, RN  Outcome: Progressing  Goal: Optimal Functional Ability  4/30/2024 1734 by Conor Pennington, RN  Outcome: Met  4/30/2024 1733 by Conor Pennington RN  Outcome: Progressing  4/30/2024 1531 by Conor Pennington RN  Outcome: Progressing  Goal: Absence of Infection Signs and Symptoms  4/30/2024 1734 by Conro Pennington, RN  Outcome: Met  4/30/2024 1733 by Conor Pennington RN  Outcome: Progressing  4/30/2024 1531 by Conor Pennington RN  Outcome: Progressing  Goal: Improved Oral Intake  4/30/2024 1734 by Conor Pennington RN  Outcome: Met  4/30/2024 1733 by Conor Pennington, RN  Outcome: Progressing  4/30/2024 1531 by Conor Pennington,  RN  Outcome: Progressing  Goal: Optimal Pain Control and Function  4/30/2024 1734 by Conor Pennington RN  Outcome: Met  4/30/2024 1733 by Conor Pennington RN  Outcome: Progressing  4/30/2024 1531 by Conor Pennington RN  Outcome: Progressing  Goal: Skin Health and Integrity  4/30/2024 1734 by Conor Pennington, RN  Outcome: Met  4/30/2024 1733 by Conor Pennington RN  Outcome: Progressing  4/30/2024 1531 by Conor Pennington RN  Outcome: Progressing  Goal: Optimal Wound Healing  4/30/2024 1734 by Conor Pennington RN  Outcome: Met  4/30/2024 1733 by Conor Pennington RN  Outcome: Progressing  4/30/2024 1531 by Conor Pennington, RN  Outcome: Progressing

## 2024-05-10 ENCOUNTER — TELEPHONE (OUTPATIENT)
Dept: ORTHOPEDICS | Facility: CLINIC | Age: 54
End: 2024-05-10
Payer: MEDICAID

## 2024-05-10 RX ORDER — OXYCODONE HYDROCHLORIDE 5 MG/1
10 TABLET ORAL EVERY 4 HOURS PRN
Qty: 56 TABLET | Refills: 0 | Status: SHIPPED | OUTPATIENT
Start: 2024-05-10 | End: 2024-05-29

## 2024-05-10 NOTE — TELEPHONE ENCOUNTER
Patient called requesting a Refill on her pain medication to be sent into her pharmacy.     Her call Back Number is [phone]

## 2024-05-14 NOTE — PROGRESS NOTES
"[facility]  NEW Patient Office Visit  05/15/2024       Patient ID: Gilma Edmonds  YOB: 1970  MRN: [id number]    Chief Complaint: Post-op Evaluation of the Left Ankle (ORIF left ankle 24 splint intact pain 8/10 takes Oxycodone as needed for pain)      Orthopaedic Injuries:   Remote hx L ankle medial mal fx w/ retained hardware   2.   L pilon fracture dislocation, associated distal fibula fx (DOI 23)    Orthopaedic Surgeries:   1. Left ankle hardware removal, ORIF pilon- 24 Shamar    HPI:  Gilma Edmonds is a 53 y.o. female with above. She presents as a new patient.  Trip and fall.  She has a 1 pack per day current smoker.  Medial malleolus hardware is from , outside hospital.    This is her 1st postop visit.  She is roughly 2 weeks postop.    12 point ROS performed and negative except as above.     Past Medical History:    Past Medical History:   Diagnosis Date    Arthritis      Past Surgical History:   Procedure Laterality Date    ANKLE HARDWARE REMOVAL Left 2024    Procedure: REMOVAL, HARDWARE, ANKLE;  Surgeon: Jared Ibanez MD;  Location: [facility];  Service: Orthopedics;  Laterality: Left;     SECTION      1    left ankle surgery      OPEN REDUCTION AND INTERNAL FIXATION (ORIF) OF PILON FRACTURE Left 2024    Procedure: ORIF, FRACTURE, PILON;  Surgeon: Jared Ibanez MD;  Location: [facility];  Service: Orthopedics;  Laterality: Left;  Open "Broken" Schukla set/hold Screw Removal set    Right habd and wrist scope  2010     Family History   Problem Relation Name Age of Onset    Diabetes Maternal Grandmother       Social History     Socioeconomic History    Marital status: Single   Tobacco Use    Smoking status: Every Day     Current packs/day: 0.50     Average packs/day: 0.5 packs/day for 35.1 years (17.5 ttl pk-yrs)     Types: Cigarettes     Start date: 1989    Smokeless tobacco: Never   Substance and Sexual Activity    Alcohol " use: Not Currently    Drug use: Never     Medication List with Changes/Refills   Current Medications    ASPIRIN 81 MG CHEW    Take 1 tablet (81 mg total) by mouth 2 (two) times a day.    GABAPENTIN (NEURONTIN) 300 MG CAPSULE    Take 1 capsule (300 mg total) by mouth 3 (three) times daily.    MELOXICAM (MOBIC) 15 MG TABLET    Take 1 tablet (15 mg total) by mouth once daily.    OXYCODONE (ROXICODONE) 5 MG IMMEDIATE RELEASE TABLET    Take 2 tablets (10 mg total) by mouth every 4 (four) hours as needed for Pain.    PANTOPRAZOLE (PROTONIX) 40 MG TABLET    Take 1 tablet (40 mg total) by mouth once daily.     Review of patient's allergies indicates:   Allergen Reactions    Sulfa (sulfonamide antibiotics) Hives     On tongue    Sulfamethoxazole-trimethoprim Hives     On tongue       Physical Exam:  Left lower extremity  We will retain sutures for 1 more week    Imaging independently interpreted:  X-ray left ankle:  No evidence of hardware complication    Assessment and Plan:    Gilma Edmonds is a 53 y.o. female with left pilon fracture, retained orthopedic hardware.    -we will retained sutures were 1 more week, placed in the Cam boot today, emphasized nonweightbearing  -smoking cessation    WB Status:  Nonweightbearing left lower extremity  Follow up:  1 week, sutures out, no x-rays needed    Sherrill Zelaya MD  [facility] PGY3          Orders Placed This Encounter    X-Ray Ankle Complete Left

## 2024-05-15 ENCOUNTER — OFFICE VISIT (OUTPATIENT)
Dept: ORTHOPEDICS | Facility: CLINIC | Age: 54
End: 2024-05-15
Payer: MEDICAID

## 2024-05-15 ENCOUNTER — HOSPITAL ENCOUNTER (OUTPATIENT)
Dept: RADIOLOGY | Facility: HOSPITAL | Age: 54
Discharge: HOME | End: 2024-05-15
Attending: STUDENT IN AN ORGANIZED HEALTH CARE EDUCATION/TRAINING PROGRAM
Payer: MEDICAID

## 2024-05-15 VITALS
DIASTOLIC BLOOD PRESSURE: 79 MMHG | WEIGHT: 117 LBS | HEART RATE: 74 BPM | OXYGEN SATURATION: 96 % | SYSTOLIC BLOOD PRESSURE: 114 MMHG | HEIGHT: 63 IN | BODY MASS INDEX: 20.73 KG/M2 | TEMPERATURE: 98 F | RESPIRATION RATE: 20 BRPM

## 2024-05-15 DIAGNOSIS — S82.842K CLOSED BIMALLEOLAR FRACTURE OF LEFT ANKLE WITH NONUNION, SUBSEQUENT ENCOUNTER: ICD-10-CM

## 2024-05-15 DIAGNOSIS — S82.842K CLOSED BIMALLEOLAR FRACTURE OF LEFT ANKLE WITH NONUNION, SUBSEQUENT ENCOUNTER: Primary | ICD-10-CM

## 2024-05-15 PROCEDURE — 3078F DIAST BP <80 MM HG: CPT | Mod: CPTII,,, | Performed by: ORTHOPAEDIC SURGERY

## 2024-05-15 PROCEDURE — 99499 UNLISTED E&M SERVICE: CPT | Mod: ,,, | Performed by: ORTHOPAEDIC SURGERY

## 2024-05-15 PROCEDURE — 3074F SYST BP LT 130 MM HG: CPT | Mod: CPTII,,, | Performed by: ORTHOPAEDIC SURGERY

## 2024-05-15 PROCEDURE — 99214 OFFICE O/P EST MOD 30 MIN: CPT | Mod: PBBFAC,25

## 2024-05-15 PROCEDURE — 73610 X-RAY EXAM OF ANKLE: CPT | Mod: TC,LT

## 2024-05-15 PROCEDURE — 1159F MED LIST DOCD IN RCRD: CPT | Mod: CPTII,,, | Performed by: ORTHOPAEDIC SURGERY

## 2024-05-15 NOTE — PROGRESS NOTES
Faculty Attestation: Gilma Edmonds  was seen at [facility] in the Orthopaedic Clinic. History of Present Illness, Physical Exam, and Assessment and Plan reviewed. Treatment plan is reasonable and appropriate. Compliance with treatment recommendations is important. Discussed with the resident at the time of the visit.  No procedure was performed.     Trent Rincon Jr, MD  Orthopaedic Surgery

## 2024-05-22 ENCOUNTER — TELEPHONE (OUTPATIENT)
Dept: ORTHOPEDICS | Facility: CLINIC | Age: 54
End: 2024-05-22

## 2024-05-23 DIAGNOSIS — S82.842K CLOSED BIMALLEOLAR FRACTURE OF LEFT ANKLE WITH NONUNION, SUBSEQUENT ENCOUNTER: Primary | ICD-10-CM

## 2024-05-23 DIAGNOSIS — S82.872A CLOSED DISPLACED PILON FRACTURE OF LEFT TIBIA, INITIAL ENCOUNTER: ICD-10-CM

## 2024-05-23 RX ORDER — METHOCARBAMOL 500 MG/1
500 TABLET, FILM COATED ORAL 4 TIMES DAILY
Qty: 40 TABLET | Refills: 0 | Status: SHIPPED | OUTPATIENT
Start: 2024-05-23 | End: 2024-06-02

## 2024-05-23 RX ORDER — HYDROCODONE BITARTRATE AND ACETAMINOPHEN 5; 325 MG/1; MG/1
1 TABLET ORAL EVERY 8 HOURS PRN
Qty: 15 TABLET | Refills: 0 | Status: SHIPPED | OUTPATIENT
Start: 2024-05-23 | End: 2024-05-23 | Stop reason: SINTOL

## 2024-05-23 RX ORDER — OXYCODONE HYDROCHLORIDE 5 MG/1
5 TABLET ORAL EVERY 8 HOURS PRN
Qty: 15 TABLET | Refills: 0 | Status: SHIPPED | OUTPATIENT
Start: 2024-05-23

## 2024-05-23 NOTE — PROGRESS NOTES
"[facility]  NEW Patient Office Visit  2024       Patient ID: Gilma Edmonds  YOB: 1970  MRN: [id number]    Chief Complaint: Pain and Post-op Evaluation of the Left Ankle (Here for suture removal left ankle, cam boot on, NWB )      Orthopaedic Injuries:   Remote hx L ankle medial mal fx w/ retained hardware   2.   L pilon fracture dislocation, associated distal fibula fx (DOI 23)    Orthopaedic Surgeries:   1. Left ankle hardware removal, ORIF pilon- 24 Shamar    HPI:  Gilma Edmonds is a 53 y.o. female with above. She presents as a new patient.  Trip and fall.  She has a 1 pack per day current smoker.  Medial malleolus hardware is from , outside hospital.    She is now about 3 weeks postop.     12 point ROS performed and negative except as above.     Past Medical History:    Past Medical History:   Diagnosis Date    Arthritis      Past Surgical History:   Procedure Laterality Date    ANKLE HARDWARE REMOVAL Left 2024    Procedure: REMOVAL, HARDWARE, ANKLE;  Surgeon: Jared Ibanez MD;  Location: [facility];  Service: Orthopedics;  Laterality: Left;     SECTION      1    left ankle surgery      OPEN REDUCTION AND INTERNAL FIXATION (ORIF) OF PILON FRACTURE Left 2024    Procedure: ORIF, FRACTURE, PILON;  Surgeon: Jared Ibanez MD;  Location: [facility];  Service: Orthopedics;  Laterality: Left;  Open "Broken" Schukla set/hold Screw Removal set    Right habd and wrist scope  2010     Family History   Problem Relation Name Age of Onset    Diabetes Maternal Grandmother       Social History     Socioeconomic History    Marital status: Single   Tobacco Use    Smoking status: Every Day     Current packs/day: 0.50     Average packs/day: 0.5 packs/day for 35.1 years (17.5 ttl pk-yrs)     Types: Cigarettes     Start date: 1989    Smokeless tobacco: Never   Substance and Sexual Activity    Alcohol use: Not Currently    Drug use: Never "     Medication List with Changes/Refills   Current Medications    ASPIRIN 81 MG CHEW    Take 1 tablet (81 mg total) by mouth 2 (two) times a day.    GABAPENTIN (NEURONTIN) 300 MG CAPSULE    Take 1 capsule (300 mg total) by mouth 3 (three) times daily.    MELOXICAM (MOBIC) 15 MG TABLET    Take 1 tablet (15 mg total) by mouth once daily.    METHOCARBAMOL (ROBAXIN) 500 MG TAB    Take 1 tablet (500 mg total) by mouth 4 (four) times daily. for 10 days    OXYCODONE (ROXICODONE) 5 MG IMMEDIATE RELEASE TABLET    Take 2 tablets (10 mg total) by mouth every 4 (four) hours as needed for Pain.    OXYCODONE (ROXICODONE) 5 MG IMMEDIATE RELEASE TABLET    Take 1 tablet (5 mg total) by mouth every 8 (eight) hours as needed for Pain.    PANTOPRAZOLE (PROTONIX) 40 MG TABLET    Take 1 tablet (40 mg total) by mouth once daily.     Review of patient's allergies indicates:   Allergen Reactions    Sulfa (sulfonamide antibiotics) Hives     On tongue    Sulfamethoxazole-trimethoprim Hives     On tongue       Physical Exam:  Left lower extremity  Sutures removed today    Imaging independently interpreted:  X-ray left ankle:  No evidence of hardware complication from previous.  No new films today.    Assessment and Plan:    Gilma Edmonds is a 53 y.o. female with left pilon fracture, retained orthopedic hardware.    -sutures removed.  Steri-Strips were not placed she reports being allergic to the adhesive.  I discussed return precautions for the incision.  She has additional risk factors including smoking previous surgery.  -smoking cessation  -I discussed this is last pain script those coming from our office, this was refilled 2 days ago    WB Status:  Nonweightbearing left lower extremity  Follow up:  4 weeks x-ray left ankle    Sherrill Zelaya MD  [employer] [facility] PGY3

## 2024-05-24 ENCOUNTER — OFFICE VISIT (OUTPATIENT)
Dept: ORTHOPEDICS | Facility: CLINIC | Age: 54
End: 2024-05-24
Payer: MEDICAID

## 2024-05-24 VITALS
WEIGHT: 117.06 LBS | SYSTOLIC BLOOD PRESSURE: 98 MMHG | RESPIRATION RATE: 20 BRPM | TEMPERATURE: 98 F | HEIGHT: 63 IN | BODY MASS INDEX: 20.74 KG/M2 | OXYGEN SATURATION: 95 % | DIASTOLIC BLOOD PRESSURE: 66 MMHG | HEART RATE: 73 BPM

## 2024-05-24 DIAGNOSIS — S82.872A CLOSED DISPLACED PILON FRACTURE OF LEFT TIBIA, INITIAL ENCOUNTER: Primary | ICD-10-CM

## 2024-05-24 PROCEDURE — 99024 POSTOP FOLLOW-UP VISIT: CPT | Mod: ,,, | Performed by: STUDENT IN AN ORGANIZED HEALTH CARE EDUCATION/TRAINING PROGRAM

## 2024-05-24 PROCEDURE — 99214 OFFICE O/P EST MOD 30 MIN: CPT | Mod: PBBFAC

## 2024-05-24 PROCEDURE — 3074F SYST BP LT 130 MM HG: CPT | Mod: CPTII,,, | Performed by: STUDENT IN AN ORGANIZED HEALTH CARE EDUCATION/TRAINING PROGRAM

## 2024-05-24 PROCEDURE — 3078F DIAST BP <80 MM HG: CPT | Mod: CPTII,,, | Performed by: STUDENT IN AN ORGANIZED HEALTH CARE EDUCATION/TRAINING PROGRAM

## 2024-05-24 PROCEDURE — 1159F MED LIST DOCD IN RCRD: CPT | Mod: CPTII,,, | Performed by: STUDENT IN AN ORGANIZED HEALTH CARE EDUCATION/TRAINING PROGRAM

## 2024-05-24 NOTE — PROGRESS NOTES
Faculty Attestation: Gilma Edmonds  was seen at [facility] in the Orthopaedic Clinic. Patient seen and evaluated at the time of the visit. History of Present Illness, Physical Exam, and Assessment and Plan reviewed. Treatment plan is reasonable and appropriate. Compliance with treatment recommendations is important. No procedure was performed.     Da Boyd MD  Orthopaedic Surgery

## 2024-05-29 ENCOUNTER — OFFICE VISIT (OUTPATIENT)
Dept: INTERNAL MEDICINE | Facility: CLINIC | Age: 54
End: 2024-05-29
Payer: MEDICAID

## 2024-05-29 VITALS
HEART RATE: 93 BPM | BODY MASS INDEX: 20.75 KG/M2 | WEIGHT: 117.13 LBS | SYSTOLIC BLOOD PRESSURE: 114 MMHG | DIASTOLIC BLOOD PRESSURE: 84 MMHG | OXYGEN SATURATION: 97 % | TEMPERATURE: 98 F | HEIGHT: 63 IN

## 2024-05-29 DIAGNOSIS — Z11.9 SCREENING EXAMINATION FOR INFECTIOUS DISEASE: ICD-10-CM

## 2024-05-29 DIAGNOSIS — Z00.00 WELLNESS EXAMINATION: ICD-10-CM

## 2024-05-29 DIAGNOSIS — F17.210 CIGARETTE NICOTINE DEPENDENCE WITHOUT COMPLICATION: ICD-10-CM

## 2024-05-29 DIAGNOSIS — M19.90 ARTHRITIS: ICD-10-CM

## 2024-05-29 DIAGNOSIS — R74.8 ELEVATED LIVER ENZYMES: ICD-10-CM

## 2024-05-29 DIAGNOSIS — Z12.31 VISIT FOR SCREENING MAMMOGRAM: ICD-10-CM

## 2024-05-29 DIAGNOSIS — Z12.11 COLON CANCER SCREENING: ICD-10-CM

## 2024-05-29 DIAGNOSIS — K76.0 HEPATIC STEATOSIS: Primary | ICD-10-CM

## 2024-05-29 DIAGNOSIS — Z82.61 FAMILY HISTORY OF RHEUMATOID ARTHRITIS: ICD-10-CM

## 2024-05-29 PROCEDURE — 99386 PREV VISIT NEW AGE 40-64: CPT | Mod: S$PBB,,, | Performed by: NURSE PRACTITIONER

## 2024-05-29 PROCEDURE — 3079F DIAST BP 80-89 MM HG: CPT | Mod: CPTII,,, | Performed by: NURSE PRACTITIONER

## 2024-05-29 PROCEDURE — 1160F RVW MEDS BY RX/DR IN RCRD: CPT | Mod: CPTII,,, | Performed by: NURSE PRACTITIONER

## 2024-05-29 PROCEDURE — 99202 OFFICE O/P NEW SF 15 MIN: CPT | Mod: S$PBB,25,, | Performed by: NURSE PRACTITIONER

## 2024-05-29 PROCEDURE — 99215 OFFICE O/P EST HI 40 MIN: CPT | Mod: PBBFAC | Performed by: NURSE PRACTITIONER

## 2024-05-29 PROCEDURE — 3008F BODY MASS INDEX DOCD: CPT | Mod: CPTII,,, | Performed by: NURSE PRACTITIONER

## 2024-05-29 PROCEDURE — 1159F MED LIST DOCD IN RCRD: CPT | Mod: CPTII,,, | Performed by: NURSE PRACTITIONER

## 2024-05-29 PROCEDURE — 3074F SYST BP LT 130 MM HG: CPT | Mod: CPTII,,, | Performed by: NURSE PRACTITIONER

## 2024-05-29 NOTE — ASSESSMENT & PLAN NOTE
She recalls being diagnosed with rheumatoid arthritis to elbows/ hands and ankle however no documentation in chart and unable to confirm. She denies seeing specialist or having blood work for this.   Family history of RA : Dad, PGM PGGM  VINAYAK, RF panel

## 2024-05-29 NOTE — PROGRESS NOTES
Emma L Rona, NP   [facility]  [address] [facility] [address]      PATIENT NAME: Gilma Edmonds  : 1970  DATE: 24  MRN: [id number]        History of Present Illness / Problem Focused Workflow     Gilma Edmonds presents to the clinic with Establish Care (Pt states that her leg has been giving her pain pt states that she has not slept in 2 days. Pt states that she does not take any pain medication currently.)     HPI: 52 yo female to establish pcp care.  She is accompanied by her sister today.  Denies past medical history except arthritis. Thinks it is RA but denies serology or XR completed. Family history of RA: Dad, PGM, PGGM. She denies seeing specialist and states she just deals with it. Currently experiencing left ankle pain s/t fracture.  Fell off of her porch at home and [facility].  She underwent surgery with Orthopedic last month for repair of one fracture but states the other fracture has to heal on own. She is in CAM boot currently. Taking Oxycodone, Methocarbamol prn and Tylenol for pain relief with mild relief.  She did have ER visit last month with elevated liver enzymes.  Recent CMP 2024 with normal LFTs.  She states prior to ER visit she was drinking alcohol daily for about a month and was taking Tylenol every 2-3 hours for pain until she saw the orthopedic provider.  She had liver ultrasound completed and referral to GI with an appointment scheduled in September of this year.  Last mammogram several years ago.  Last Pap smear many years ago.  Never had a colonoscopy.  She will consider options for colon cancer screening but is unsure how to proceed at this time.  Denies any fever, chills, night sweats, headaches, dizziness, visual changes, shortness of breath, chest pain, abdominal pain, nausea, vomiting, diarrhea, hematochezia, hematuria or vaginal bleeding.    Other providers  "  [facility] orthopedic   [facility] appointment 2024    Review of Systems     Review of Systems   Constitutional: Negative.    HENT: Negative.     Eyes: Negative.    Respiratory: Negative.     Cardiovascular: Negative.    Gastrointestinal: Negative.    Endocrine: Negative.    Genitourinary: Negative.    Musculoskeletal:  Positive for arthralgias and myalgias.   Skin: Negative.    Allergic/Immunologic: Negative.    Neurological: Negative.    Hematological: Negative.    Psychiatric/Behavioral: Negative.         Medical / Social / Family History     -------------------------------------    Arthritis        Past Surgical History:   Procedure Laterality Date    ANKLE HARDWARE REMOVAL Left 2024    Procedure: REMOVAL, HARDWARE, ANKLE;  Surgeon: Jared Ibanez MD;  Location: [facility];  Service: Orthopedics;  Laterality: Left;     SECTION      1    left ankle surgery      OPEN REDUCTION AND INTERNAL FIXATION (ORIF) OF PILON FRACTURE Left 2024    Procedure: ORIF, FRACTURE, PILON;  Surgeon: Jared Ibanez MD;  Location: [facility];  Service: Orthopedics;  Laterality: Left;  Open "Broken" Schukla set/hold Screw Removal set    Right habd and wrist scope         Social History     Socioeconomic History    Marital status: Single    Number of children: 2    Highest education level: High school graduate   Occupational History     Comment: unemployed   Tobacco Use    Smoking status: Every Day     Current packs/day: 0.50     Average packs/day: 0.5 packs/day for 35.1 years (17.5 ttl pk-yrs)     Types: Cigarettes     Start date: 1989    Smokeless tobacco: Never   Substance and Sexual Activity    Alcohol use: Not Currently     Alcohol/week: 3.0 standard drinks of alcohol     Types: 3 Glasses of wine per week     Comment: former daily alcohol drinker x 1 month, stopped in 2024    Drug use: Not Currently        Family History   Problem Relation Name Age of Onset    Rheum arthritis Father      Diabetes " "Maternal Grandmother      Rheum arthritis Paternal Grandmother      Rheum arthritis Paternal Great-Grandmother          Medications and Allergies     Medications  Current Outpatient Medications   Medication Instructions    aspirin 81 mg, Oral, 2 times daily    gabapentin (NEURONTIN) 300 mg, Oral, 3 times daily    methocarbamoL (ROBAXIN) 500 mg, Oral, 4 times daily    oxyCODONE (ROXICODONE) 5 mg, Oral, Every 8 hours PRN       Allergies  Review of patient's allergies indicates:   Allergen Reactions    Meloxicam Swelling    Sulfa (sulfonamide antibiotics) Hives     On tongue    Sulfamethoxazole-trimethoprim Hives     On tongue       Physical Examination     Visit Vitals  /84 (BP Location: Left arm, Patient Position: Sitting, BP Method: Medium (Manual))   Pulse 93   Temp 97.9 °F (36.6 °C) (Oral)   Ht 5' 3" (1.6 m)   Wt 53.1 kg (117 lb 1.6 oz)   SpO2 97%   BMI 20.74 kg/m²       Physical Exam  Vitals and nursing note reviewed.   Constitutional:       General: She is not in acute distress.     Appearance: Normal appearance. She is not ill-appearing, toxic-appearing or diaphoretic.   HENT:      Head: Normocephalic.      Right Ear: Tympanic membrane, ear canal and external ear normal.      Left Ear: Tympanic membrane, ear canal and external ear normal.      Nose: Nose normal.      Mouth/Throat:      Mouth: Mucous membranes are moist.   Eyes:      Extraocular Movements: Extraocular movements intact.      Conjunctiva/sclera: Conjunctivae normal.      Pupils: Pupils are equal, round, and reactive to light.   Neck:      Thyroid: No thyroid mass, thyromegaly or thyroid tenderness.      Vascular: No carotid bruit.   Cardiovascular:      Rate and Rhythm: Normal rate and regular rhythm.      Pulses: Normal pulses.           Dorsalis pedis pulses are 2+ on the right side.        Posterior tibial pulses are 2+ on the right side.   Pulmonary:      Effort: Pulmonary effort is normal. No respiratory distress.      Breath sounds: " Normal breath sounds.   Abdominal:      General: Bowel sounds are normal. There is no distension.      Palpations: Abdomen is soft.      Tenderness: There is no abdominal tenderness.   Musculoskeletal:         General: Normal range of motion.      Cervical back: Neck supple.      Right lower leg: No edema.      Left lower leg: No edema.      Left foot: Tenderness present.      Comments: Left foot with Cam boot   Lymphadenopathy:      Cervical: No cervical adenopathy.   Skin:     General: Skin is warm and dry.      Capillary Refill: Capillary refill takes less than 2 seconds.   Neurological:      Mental Status: She is alert and oriented to person, place, and time. Mental status is at baseline.      Motor: No weakness.      Coordination: Coordination normal.      Gait: Gait normal.   Psychiatric:         Mood and Affect: Mood normal.         Behavior: Behavior normal.         Thought Content: Thought content normal.         Judgment: Judgment normal.           Results     Lab Results   Component Value Date    WBC 11.78 (H) 04/15/2024    RBC 4.57 04/15/2024    HGB 16.3 (H) 04/15/2024    HCT 45.6 04/15/2024    MCV 99.8 (H) 04/15/2024    MCH 35.7 (H) 04/15/2024    MCHC 35.7 04/15/2024    RDW 13.6 04/15/2024     (H) 04/15/2024    MPV 9.7 04/15/2024     Sodium   Date Value Ref Range Status   04/30/2024 139 136 - 145 mmol/L Final     Potassium   Date Value Ref Range Status   04/30/2024 3.8 3.5 - 5.1 mmol/L Final     CO2   Date Value Ref Range Status   04/30/2024 20 (L) 22 - 29 mmol/L Final     Blood Urea Nitrogen   Date Value Ref Range Status   04/30/2024 14.3 9.8 - 20.1 mg/dL Final     Creatinine   Date Value Ref Range Status   04/30/2024 0.76 0.55 - 1.02 mg/dL Final     Calcium   Date Value Ref Range Status   04/30/2024 10.1 8.4 - 10.2 mg/dL Final     Albumin   Date Value Ref Range Status   04/30/2024 3.9 3.5 - 5.0 g/dL Final     Bilirubin Total   Date Value Ref Range Status   04/30/2024 0.4 <=1.5 mg/dL Final  "    ALP   Date Value Ref Range Status   04/30/2024 102 40 - 150 unit/L Final     AST   Date Value Ref Range Status   04/30/2024 25 5 - 34 unit/L Final     ALT   Date Value Ref Range Status   04/30/2024 27 0 - 55 unit/L Final     No results found for: "CHOL"  No results found for: "HDL"  No results found for: "LDLCALC"  No results found for: "TRIG"  No results found for: "CHOLHDL"  No results found for: "TSH"  No results found for: "PHUR", "SPECGRAV", "PROTEINUA", "GLUCUA", "KETONESU", "OCCULTUA", "NITRITE", "LEUKOCYTESUR"  No results found for: "HGBA1C"  No results found for: "MICROALBUR", "[id number]"   No results found for this or any previous visit.         Assessment       ICD-10-CM ICD-9-CM   1. Hepatic steatosis  K76.0 571.8   2. Elevated liver enzymes  R74.8 790.5   3. Wellness examination  Z00.00 V70.0   4. Visit for screening mammogram  Z12.31 V76.12   5. Colon cancer screening  Z12.11 V76.51   6. Screening examination for infectious disease  Z11.9 V75.9   7. Arthritis  M19.90 716.90   8. Family history of rheumatoid arthritis  Z82.61 V17.7   9. Cigarette nicotine dependence without complication  F17.210 305.1       Plan       Problem List Items Addressed This Visit          Immunology/Multi System    Family history of rheumatoid arthritis    Relevant Orders    Antinuclear Antibody (VINAYAK), HEp-2, IgG    Rheumatoid Factors, IgA, IgG, IgM       GI    Hepatic steatosis - Primary    Overview     4/15/24 CMP results elevated LFTs: 825-->715-->25 and 493-->491-->27  Hep negative   4/17/24 Abdominal US liver   FINDINGS:  Liver:     Size: 17.1 cm in the right midclavicular line, normal     Appearance: There is some heterogenicity of the liver parenchyma with slight increased echogenicity in increased echogenicity decreases the sensitivity to detect focal lesions     Mass: No focal masses     Gallbladder:     Stones/Sludge: None     Appearance: No wall thickening, pericholecystic fluid or hydrops     Sonographic " Cabrera's Sign: Negative     Bile Ducts:     Intrahepatic Ducts: No dilatation     Extrahepatic Ducts: Common bile duct measures 0.55 cm, no dilatation     Pancreas:     Visualized portions of the pancreas are normal.     Right Kidney:     Size: 10.3 cm in length     Echogenicity: Normal     Collecting System: No hydronephrosis     Stone: None     Cyst/Mass: None     Vessels:     Aorta: Visualized portions are normal.     Inferior Vena Cava: Visualized portions are normal.     Main Portal Vein: Patent with hepatopedal  flow.     Free Fluid:     No ascites or pleural effusions     Impression:     Slight heterogenicity to the liver parenchyma with increased echogenicity suggestive of hepatic steatosis.     No other significant abnormality         Current Assessment & Plan     Referral to GI per ER provider, appt 9/2024  She reports drinking alcohol daily for about a month prior this; would drink about 3 glasses of wine/d and taking Tylenol every 2-3 hours for fracture pain relief until she was seen by orthopedic   Denies any alcohol at present. Started taking Tylenol again recently due to pain from surgery. Pt will f/u with orthopedic regarding.         Relevant Orders    Comprehensive Metabolic Panel    Lipid Panel       Orthopedic    Arthritis    Current Assessment & Plan     She recalls being diagnosed with rheumatoid arthritis to elbows/ hands and ankle however no documentation in chart and unable to confirm. She denies seeing specialist or having blood work for this.   Family history of RA : Dad, PGM PGGM  VINAYAK, RF panel          Relevant Orders    Antinuclear Antibody (VINAYAK), HEp-2, IgG    Rheumatoid Factors, IgA, IgG, IgM       Other    Cigarette nicotine dependence without complication    Current Assessment & Plan     0.5 ppd  Discussed for at least 3 minutes importance of smoking cessation and health benefits, including adverse effects to patient's health and organs.  Encouraged cessation.            Other Visit  Diagnoses       Elevated liver enzymes        Wellness examination      Avoid tobacco/ alcohol/ drugs  Regular exercise as tolerated at least 5 days/ week of 30 minutes moderate intensity exercise (brisk walking) and 2 or more days/ week of muscle strength activities (as tolerated).  Eat well balanced diet of fresh fruits/ vegetables, whole grains, lean meats and limit high carbohydrate foods.   Healthy lifestyle habits.  Regular eye/ dental exams as recommended    Screenings:   PAP referral ordered  MMG ordered  CRC discuss options of colonoscopy or Cologuard.  Patient will consider and do research and let us know at next visit which she would like to proceed with    Vaccines as recommended      Relevant Orders    CBC Auto Differential    Comprehensive Metabolic Panel    Hemoglobin A1C    Lipid Panel    TSH    Urinalysis    HIV 1/2 Ag/Ab (4th Gen)    SYPHILIS ANTIBODY (WITH REFLEX RPR)    Ambulatory referral/consult to Gynecology    Visit for screening mammogram        Relevant Orders    Mammo Digital Screening Bilat w/ Olivier    Colon cancer screening        Screening examination for infectious disease        Relevant Orders    Urinalysis    HIV 1/2 Ag/Ab (4th Gen)    SYPHILIS ANTIBODY (WITH REFLEX RPR)            Future Appointments   Date Time Provider Department Center   6/24/2024 10:30 AM RESIDENT 2, [facility] ORTHOPEDICS [facility] ORTHO [place]    7/24/2024 10:40 AM Emma Rea NP [facility] INTMED [place] [facility]   9/9/2024  9:00 AM Emilee Tran FNP [facility] GASTRO [place] [facility]        Follow up in about 4 weeks (around 6/26/2024).    Signature:  Emma Rea NP  [facility]  [address] [facility] [address]    Date of encounter: 5/29/24

## 2024-05-29 NOTE — ASSESSMENT & PLAN NOTE
Referral to GI per ER provider, appt 9/2024  She reports drinking alcohol daily for about a month prior this; would drink about 3 glasses of wine/d and taking Tylenol every 2-3 hours for fracture pain relief until she was seen by orthopedic   Denies any alcohol at present. Started taking Tylenol again recently due to pain from surgery. Pt will f/u with orthopedic regarding.

## 2024-06-13 ENCOUNTER — HOSPITAL ENCOUNTER (OUTPATIENT)
Dept: RADIOLOGY | Facility: HOSPITAL | Age: 54
Discharge: HOME | End: 2024-06-13
Attending: NURSE PRACTITIONER
Payer: MEDICAID

## 2024-06-13 DIAGNOSIS — Z12.31 VISIT FOR SCREENING MAMMOGRAM: ICD-10-CM

## 2024-06-13 PROCEDURE — 77063 BREAST TOMOSYNTHESIS BI: CPT | Mod: 26,,, | Performed by: RADIOLOGY

## 2024-06-13 PROCEDURE — 77067 SCR MAMMO BI INCL CAD: CPT | Mod: TC

## 2024-06-13 PROCEDURE — 77063 BREAST TOMOSYNTHESIS BI: CPT | Mod: TC

## 2024-06-13 PROCEDURE — 77067 SCR MAMMO BI INCL CAD: CPT | Mod: 26,,, | Performed by: RADIOLOGY

## 2024-06-23 NOTE — PROGRESS NOTES
"[facility]  NEW Patient Office Visit  2024       Patient ID: Gilma Edmonds  YOB: 1970  MRN: [id number]    Chief Complaint: No chief complaint on file.      Orthopaedic Injuries:   Remote hx L ankle medial mal fx w/ retained hardware   2.   L pilon fracture dislocation, associated distal fibula fx (DOI 23)    Orthopaedic Surgeries:   1. Left ankle hardware removal, ORIF pilon- 24 Shamar    HPI:  Gilma Edmonds is a 53 y.o. female with above. She presents as a new patient.  Trip and fall.  She has a 1 pack per day current smoker.  Medial malleolus hardware is from , outside hospital.    Interval: She is now 8 weeks post op.  Has been compliant with weight-bearing restrictions.  Has been working on range of motion.    12 point ROS performed and negative except as above.     Past Medical History:    Past Medical History:   Diagnosis Date    Arthritis      Past Surgical History:   Procedure Laterality Date    ANKLE HARDWARE REMOVAL Left 2024    Procedure: REMOVAL, HARDWARE, ANKLE;  Surgeon: Jared Ibanez MD;  Location: [facility];  Service: Orthopedics;  Laterality: Left;     SECTION      1    left ankle surgery      OPEN REDUCTION AND INTERNAL FIXATION (ORIF) OF PILON FRACTURE Left 2024    Procedure: ORIF, FRACTURE, PILON;  Surgeon: Jared Ibanez MD;  Location: [facility];  Service: Orthopedics;  Laterality: Left;  Open "Broken" Schukla set/hold Screw Removal set    Right habd and wrist scope  2010     Family History   Problem Relation Name Age of Onset    Rheum arthritis Father      Diabetes Maternal Grandmother      Rheum arthritis Paternal Grandmother      Rheum arthritis Paternal Great-Grandmother       Social History     Socioeconomic History    Marital status: Single    Number of children: 2    Highest education level: High school graduate   Occupational History     Comment: unemployed   Tobacco Use    Smoking status: Every Day     " Current packs/day: 0.50     Average packs/day: 0.5 packs/day for 35.2 years (17.6 ttl pk-yrs)     Types: Cigarettes     Start date: 4/24/1989    Smokeless tobacco: Never   Substance and Sexual Activity    Alcohol use: Not Currently     Alcohol/week: 3.0 standard drinks of alcohol     Types: 3 Glasses of wine per week     Comment: former daily alcohol drinker x 1 month, stopped in april 2024    Drug use: Not Currently     Medication List with Changes/Refills   Current Medications    ASPIRIN 81 MG CHEW    Take 1 tablet (81 mg total) by mouth 2 (two) times a day.    GABAPENTIN (NEURONTIN) 300 MG CAPSULE    Take 1 capsule (300 mg total) by mouth 3 (three) times daily.    OXYCODONE (ROXICODONE) 5 MG IMMEDIATE RELEASE TABLET    Take 1 tablet (5 mg total) by mouth every 8 (eight) hours as needed for Pain.     Review of patient's allergies indicates:   Allergen Reactions    Meloxicam Swelling    Sulfa (sulfonamide antibiotics) Hives     On tongue    Sulfamethoxazole-trimethoprim Hives     On tongue       Physical Exam:  Left lower extremity  Surgical incision is well healed   Dorsiflexion to neutral   Motor intact TA/GS/EHL/FHL    Imaging independently interpreted:  X-ray left ankle:  No evidence of hardware complication, fracture planes are less obvious as compared to prior.    Assessment and Plan:    Gilma Edmonds is a 53 y.o. female with left pilon fracture, retained orthopedic hardware.    -smoking cessation  -she is to remain nonweightbearing for 4 additional weeks, I discussed with her after that she can start putting some weight on in the Cam boot.  -she will see us back in 5 weeks for repeat films and let us know if she was able to advance weight-bearing    WB Status:  Nonweightbearing left lower extremity, advance to weight-bearing in cam boot in 4 weeks  Follow up:  4 weeks x-ray left ankle    Sherrill Zelaya MD  [employer] PGY3

## 2024-06-24 ENCOUNTER — HOSPITAL ENCOUNTER (OUTPATIENT)
Dept: RADIOLOGY | Facility: HOSPITAL | Age: 54
Discharge: HOME | End: 2024-06-24
Attending: STUDENT IN AN ORGANIZED HEALTH CARE EDUCATION/TRAINING PROGRAM
Payer: MEDICAID

## 2024-06-24 ENCOUNTER — OFFICE VISIT (OUTPATIENT)
Dept: ORTHOPEDICS | Facility: CLINIC | Age: 54
End: 2024-06-24
Payer: MEDICAID

## 2024-06-24 VITALS
OXYGEN SATURATION: 97 % | DIASTOLIC BLOOD PRESSURE: 82 MMHG | WEIGHT: 117.06 LBS | HEIGHT: 63 IN | TEMPERATURE: 98 F | HEART RATE: 83 BPM | SYSTOLIC BLOOD PRESSURE: 118 MMHG | BODY MASS INDEX: 20.74 KG/M2

## 2024-06-24 DIAGNOSIS — G89.29 CHRONIC PAIN OF LEFT ANKLE: Primary | ICD-10-CM

## 2024-06-24 DIAGNOSIS — M25.572 CHRONIC PAIN OF LEFT ANKLE: ICD-10-CM

## 2024-06-24 DIAGNOSIS — G89.29 CHRONIC PAIN OF LEFT ANKLE: ICD-10-CM

## 2024-06-24 DIAGNOSIS — M25.572 CHRONIC PAIN OF LEFT ANKLE: Primary | ICD-10-CM

## 2024-06-24 PROCEDURE — 99213 OFFICE O/P EST LOW 20 MIN: CPT | Mod: PBBFAC,25

## 2024-06-24 PROCEDURE — 3074F SYST BP LT 130 MM HG: CPT | Mod: CPTII,,, | Performed by: STUDENT IN AN ORGANIZED HEALTH CARE EDUCATION/TRAINING PROGRAM

## 2024-06-24 PROCEDURE — 1159F MED LIST DOCD IN RCRD: CPT | Mod: CPTII,,, | Performed by: STUDENT IN AN ORGANIZED HEALTH CARE EDUCATION/TRAINING PROGRAM

## 2024-06-24 PROCEDURE — 73610 X-RAY EXAM OF ANKLE: CPT | Mod: TC,LT

## 2024-06-24 PROCEDURE — 3079F DIAST BP 80-89 MM HG: CPT | Mod: CPTII,,, | Performed by: STUDENT IN AN ORGANIZED HEALTH CARE EDUCATION/TRAINING PROGRAM

## 2024-06-24 PROCEDURE — 99024 POSTOP FOLLOW-UP VISIT: CPT | Mod: ,,, | Performed by: STUDENT IN AN ORGANIZED HEALTH CARE EDUCATION/TRAINING PROGRAM

## 2024-07-19 ENCOUNTER — LAB VISIT (OUTPATIENT)
Dept: LAB | Facility: HOSPITAL | Age: 54
End: 2024-07-19
Attending: NURSE PRACTITIONER
Payer: MEDICAID

## 2024-07-19 DIAGNOSIS — Z11.9 SCREENING EXAMINATION FOR INFECTIOUS DISEASE: ICD-10-CM

## 2024-07-19 DIAGNOSIS — Z82.61 FAMILY HISTORY OF RHEUMATOID ARTHRITIS: ICD-10-CM

## 2024-07-19 DIAGNOSIS — M19.90 ARTHRITIS: ICD-10-CM

## 2024-07-19 DIAGNOSIS — K76.0 HEPATIC STEATOSIS: ICD-10-CM

## 2024-07-19 DIAGNOSIS — Z00.00 WELLNESS EXAMINATION: ICD-10-CM

## 2024-07-19 LAB
ALBUMIN SERPL-MCNC: 3.9 G/DL (ref 3.5–5)
ALBUMIN/GLOB SERPL: 1.2 RATIO (ref 1.1–2)
ALP SERPL-CCNC: 148 UNIT/L (ref 40–150)
ALT SERPL-CCNC: 13 UNIT/L (ref 0–55)
ANION GAP SERPL CALC-SCNC: 10 MEQ/L
AST SERPL-CCNC: 22 UNIT/L (ref 5–34)
BACTERIA #/AREA URNS AUTO: ABNORMAL /HPF
BASOPHILS # BLD AUTO: 0.07 X10(3)/MCL
BASOPHILS NFR BLD AUTO: 0.7 %
BILIRUB SERPL-MCNC: 0.7 MG/DL
BILIRUB UR QL STRIP.AUTO: NEGATIVE
BUN SERPL-MCNC: 13.6 MG/DL (ref 9.8–20.1)
CALCIUM SERPL-MCNC: 9.2 MG/DL (ref 8.4–10.2)
CHLORIDE SERPL-SCNC: 108 MMOL/L (ref 98–107)
CHOLEST SERPL-MCNC: 158 MG/DL
CHOLEST/HDLC SERPL: 3 {RATIO} (ref 0–5)
CLARITY UR: ABNORMAL
CO2 SERPL-SCNC: 21 MMOL/L (ref 22–29)
COLOR UR AUTO: YELLOW
CREAT SERPL-MCNC: 0.6 MG/DL (ref 0.55–1.02)
CREAT/UREA NIT SERPL: 23
EOSINOPHIL # BLD AUTO: 0.25 X10(3)/MCL (ref 0–0.9)
EOSINOPHIL NFR BLD AUTO: 2.6 %
ERYTHROCYTE [DISTWIDTH] IN BLOOD BY AUTOMATED COUNT: 13.6 % (ref 11.5–17)
EST. AVERAGE GLUCOSE BLD GHB EST-MCNC: 93.9 MG/DL
GFR SERPLBLD CREATININE-BSD FMLA CKD-EPI: >60 ML/MIN/1.73/M2
GLOBULIN SER-MCNC: 3.3 GM/DL (ref 2.4–3.5)
GLUCOSE SERPL-MCNC: 111 MG/DL (ref 74–100)
GLUCOSE UR QL STRIP: NORMAL
HBA1C MFR BLD: 4.9 %
HCT VFR BLD AUTO: 43.8 % (ref 37–47)
HDLC SERPL-MCNC: 56 MG/DL (ref 35–60)
HGB BLD-MCNC: 15.2 G/DL (ref 12–16)
HGB UR QL STRIP: ABNORMAL
HIV 1+2 AB+HIV1 P24 AG SERPL QL IA: NONREACTIVE
HYALINE CASTS #/AREA URNS LPF: ABNORMAL /LPF
IMM GRANULOCYTES # BLD AUTO: 0.02 X10(3)/MCL (ref 0–0.04)
IMM GRANULOCYTES NFR BLD AUTO: 0.2 %
KETONES UR QL STRIP: ABNORMAL
LDLC SERPL CALC-MCNC: 83 MG/DL (ref 50–140)
LEUKOCYTE ESTERASE UR QL STRIP: NEGATIVE
LYMPHOCYTES # BLD AUTO: 2.64 X10(3)/MCL (ref 0.6–4.6)
LYMPHOCYTES NFR BLD AUTO: 26.9 %
MCH RBC QN AUTO: 33 PG (ref 27–31)
MCHC RBC AUTO-ENTMCNC: 34.7 G/DL (ref 33–36)
MCV RBC AUTO: 95.2 FL (ref 80–94)
MONOCYTES # BLD AUTO: 0.74 X10(3)/MCL (ref 0.1–1.3)
MONOCYTES NFR BLD AUTO: 7.6 %
MUCOUS THREADS URNS QL MICRO: ABNORMAL /LPF
NEUTROPHILS # BLD AUTO: 6.08 X10(3)/MCL (ref 2.1–9.2)
NEUTROPHILS NFR BLD AUTO: 62 %
NITRITE UR QL STRIP: NEGATIVE
NRBC BLD AUTO-RTO: 0 %
PH UR STRIP: 6 [PH]
PLATELET # BLD AUTO: 390 X10(3)/MCL (ref 130–400)
PMV BLD AUTO: 9.4 FL (ref 7.4–10.4)
POTASSIUM SERPL-SCNC: 3.4 MMOL/L (ref 3.5–5.1)
PROT SERPL-MCNC: 7.2 GM/DL (ref 6.4–8.3)
PROT UR QL STRIP: ABNORMAL
RBC # BLD AUTO: 4.6 X10(6)/MCL (ref 4.2–5.4)
RBC #/AREA URNS AUTO: ABNORMAL /HPF
SODIUM SERPL-SCNC: 139 MMOL/L (ref 136–145)
SP GR UR STRIP.AUTO: 1.02 (ref 1–1.03)
SQUAMOUS #/AREA URNS LPF: ABNORMAL /HPF
T PALLIDUM AB SER QL: NONREACTIVE
TRIGL SERPL-MCNC: 95 MG/DL (ref 37–140)
TSH SERPL-ACNC: 1.43 UIU/ML (ref 0.35–4.94)
UROBILINOGEN UR STRIP-ACNC: NORMAL
VLDLC SERPL CALC-MCNC: 19 MG/DL
WBC # BLD AUTO: 9.8 X10(3)/MCL (ref 4.5–11.5)
WBC #/AREA URNS AUTO: ABNORMAL /HPF

## 2024-07-19 PROCEDURE — 87389 HIV-1 AG W/HIV-1&-2 AB AG IA: CPT

## 2024-07-19 PROCEDURE — 86039 ANTINUCLEAR ANTIBODIES (ANA): CPT

## 2024-07-19 PROCEDURE — 83036 HEMOGLOBIN GLYCOSYLATED A1C: CPT

## 2024-07-19 PROCEDURE — 83516 IMMUNOASSAY NONANTIBODY: CPT

## 2024-07-19 PROCEDURE — 84443 ASSAY THYROID STIM HORMONE: CPT

## 2024-07-19 PROCEDURE — 81001 URINALYSIS AUTO W/SCOPE: CPT

## 2024-07-19 PROCEDURE — 86780 TREPONEMA PALLIDUM: CPT

## 2024-07-19 PROCEDURE — 80053 COMPREHEN METABOLIC PANEL: CPT

## 2024-07-19 PROCEDURE — 80061 LIPID PANEL: CPT

## 2024-07-19 PROCEDURE — 36415 COLL VENOUS BLD VENIPUNCTURE: CPT

## 2024-07-19 PROCEDURE — 85025 COMPLETE CBC W/AUTO DIFF WBC: CPT

## 2024-07-21 LAB
AR ANA INTERPRETIVE COMMENT: NORMAL
AR ANTINUCLEAR ANTIBODY (ANA), HEP-2, IGG: NORMAL

## 2024-07-22 ENCOUNTER — HOSPITAL ENCOUNTER (OUTPATIENT)
Dept: RADIOLOGY | Facility: HOSPITAL | Age: 54
Discharge: HOME | End: 2024-07-22
Attending: NURSE PRACTITIONER
Payer: MEDICAID

## 2024-07-22 DIAGNOSIS — R92.8 ABNORMAL MAMMOGRAM: ICD-10-CM

## 2024-07-22 LAB
RF IGA SER-ACNC: 6 UNITS
RF IGG SER-ACNC: 6 UNITS
RF IGM SER-ACNC: 5 UNITS

## 2024-07-22 PROCEDURE — 77065 DX MAMMO INCL CAD UNI: CPT | Mod: 26,LT,, | Performed by: RADIOLOGY

## 2024-07-22 PROCEDURE — 77065 DX MAMMO INCL CAD UNI: CPT | Mod: TC,LT

## 2024-07-22 PROCEDURE — 77061 BREAST TOMOSYNTHESIS UNI: CPT | Mod: 26,LT,, | Performed by: RADIOLOGY

## 2024-07-22 PROCEDURE — 76642 ULTRASOUND BREAST LIMITED: CPT | Mod: TC,LT

## 2024-07-22 PROCEDURE — 77061 BREAST TOMOSYNTHESIS UNI: CPT | Mod: TC,LT

## 2024-07-22 PROCEDURE — 76642 ULTRASOUND BREAST LIMITED: CPT | Mod: 26,LT,, | Performed by: RADIOLOGY

## 2024-07-24 ENCOUNTER — HOSPITAL ENCOUNTER (OUTPATIENT)
Dept: RADIOLOGY | Facility: HOSPITAL | Age: 54
Discharge: HOME | End: 2024-07-24
Attending: NURSE PRACTITIONER
Payer: MEDICAID

## 2024-07-24 ENCOUNTER — OFFICE VISIT (OUTPATIENT)
Dept: INTERNAL MEDICINE | Facility: CLINIC | Age: 54
End: 2024-07-24
Payer: MEDICAID

## 2024-07-24 VITALS
BODY MASS INDEX: 21.62 KG/M2 | RESPIRATION RATE: 18 BRPM | DIASTOLIC BLOOD PRESSURE: 88 MMHG | WEIGHT: 122 LBS | SYSTOLIC BLOOD PRESSURE: 128 MMHG | HEART RATE: 67 BPM | HEIGHT: 63 IN | TEMPERATURE: 98 F

## 2024-07-24 DIAGNOSIS — R06.02 SHORTNESS OF BREATH: ICD-10-CM

## 2024-07-24 DIAGNOSIS — G89.29 CHRONIC BILATERAL THORACIC BACK PAIN: ICD-10-CM

## 2024-07-24 DIAGNOSIS — M54.6 CHRONIC BILATERAL THORACIC BACK PAIN: ICD-10-CM

## 2024-07-24 DIAGNOSIS — M79.641 BILATERAL HAND PAIN: ICD-10-CM

## 2024-07-24 DIAGNOSIS — R07.9 CHEST PAIN, UNSPECIFIED TYPE: Primary | ICD-10-CM

## 2024-07-24 DIAGNOSIS — F17.210 CIGARETTE NICOTINE DEPENDENCE WITHOUT COMPLICATION: ICD-10-CM

## 2024-07-24 DIAGNOSIS — M79.642 BILATERAL HAND PAIN: ICD-10-CM

## 2024-07-24 DIAGNOSIS — M25.522 LEFT ELBOW PAIN: ICD-10-CM

## 2024-07-24 DIAGNOSIS — R07.9 CHEST PAIN, UNSPECIFIED TYPE: ICD-10-CM

## 2024-07-24 DIAGNOSIS — Z87.81 HISTORY OF PELVIC FRACTURE: ICD-10-CM

## 2024-07-24 DIAGNOSIS — Z87.81 HISTORY OF FRACTURE OF LEFT ANKLE: ICD-10-CM

## 2024-07-24 DIAGNOSIS — N28.1 RENAL CYST: ICD-10-CM

## 2024-07-24 LAB
OHS QRS DURATION: 88 MS
OHS QTC CALCULATION: 445 MS

## 2024-07-24 PROCEDURE — 73130 X-RAY EXAM OF HAND: CPT | Mod: TC,LT

## 2024-07-24 PROCEDURE — 73130 X-RAY EXAM OF HAND: CPT | Mod: TC,RT

## 2024-07-24 PROCEDURE — 1159F MED LIST DOCD IN RCRD: CPT | Mod: CPTII,,, | Performed by: NURSE PRACTITIONER

## 2024-07-24 PROCEDURE — 1160F RVW MEDS BY RX/DR IN RCRD: CPT | Mod: CPTII,,, | Performed by: NURSE PRACTITIONER

## 2024-07-24 PROCEDURE — 72110 X-RAY EXAM L-2 SPINE 4/>VWS: CPT | Mod: TC

## 2024-07-24 PROCEDURE — 71046 X-RAY EXAM CHEST 2 VIEWS: CPT | Mod: TC

## 2024-07-24 PROCEDURE — 3008F BODY MASS INDEX DOCD: CPT | Mod: CPTII,,, | Performed by: NURSE PRACTITIONER

## 2024-07-24 PROCEDURE — 99215 OFFICE O/P EST HI 40 MIN: CPT | Mod: 25,S$PBB,, | Performed by: NURSE PRACTITIONER

## 2024-07-24 PROCEDURE — 3079F DIAST BP 80-89 MM HG: CPT | Mod: CPTII,,, | Performed by: NURSE PRACTITIONER

## 2024-07-24 PROCEDURE — 99215 OFFICE O/P EST HI 40 MIN: CPT | Mod: PBBFAC,25 | Performed by: NURSE PRACTITIONER

## 2024-07-24 PROCEDURE — 72070 X-RAY EXAM THORAC SPINE 2VWS: CPT | Mod: TC

## 2024-07-24 PROCEDURE — 3044F HG A1C LEVEL LT 7.0%: CPT | Mod: CPTII,,, | Performed by: NURSE PRACTITIONER

## 2024-07-24 PROCEDURE — 99406 BEHAV CHNG SMOKING 3-10 MIN: CPT | Mod: S$PBB,,, | Performed by: NURSE PRACTITIONER

## 2024-07-24 PROCEDURE — 3074F SYST BP LT 130 MM HG: CPT | Mod: CPTII,,, | Performed by: NURSE PRACTITIONER

## 2024-07-24 PROCEDURE — 73080 X-RAY EXAM OF ELBOW: CPT | Mod: TC,LT

## 2024-07-24 NOTE — ASSESSMENT & PLAN NOTE
Reports history of renal cyst per MRI pelvis findings years ago. Do not have records available for review at this time. Renal US ordered

## 2024-07-24 NOTE — ASSESSMENT & PLAN NOTE
Pt endorses episodes of CP and SOB x few months with relief with ASA and rest.   EKG in clinic today   Patient unable to ambulate successfully to complete treadmill stress test s/t recovering from left ankle fracture.  Outpatient stress test   ER precautions  Avoid alcohol/ tobacco abuse

## 2024-07-24 NOTE — ASSESSMENT & PLAN NOTE
Pt with chronic mid- low back pain without radiculopathy symptoms. XR thoracic/ lumbar spine for further evaluation. Possible PT referral once review.

## 2024-07-24 NOTE — ASSESSMENT & PLAN NOTE
Pt with chronic left elbow pain and swelling associated with limited ROM with limited complete extension. Obtain imaging with referral to [facility].

## 2024-07-24 NOTE — ASSESSMENT & PLAN NOTE
Patient with fall with sustained pelvic fracture years ago. Will obtain DEXA as pt has history of multiple fractures.

## 2024-07-24 NOTE — ASSESSMENT & PLAN NOTE
0.5 ppd  Discussed for at least 3 minutes importance of smoking cessation and health benefits, including adverse effects to patient's health and organs.  Encouraged cessation.  Per requirements, pt does not meet requirements for LDCT screening approved per insurance

## 2024-07-24 NOTE — PROGRESS NOTES
Emma L Rona, NP   [facility]  [address] [facility] [address]      PATIENT NAME: Gilma Edmonds  : 1970  DATE: 24  MRN: [id number]        History of Present Illness / Problem Focused Workflow     Gilma Edmonds presents to the clinic with LAB RESULTS     HPI: Initial visit 24- 54 yo female to establish pcp care.  She is accompanied by her sister today.  Denies past medical history except arthritis. Thinks it is RA but denies serology or XR completed. Family history of RA: Dad, PGM, PGGM. She denies seeing specialist and states she just deals with it. Currently experiencing left ankle pain s/t fracture.  Fell off of her porch at home and [facility].  She underwent surgery with Orthopedic last month for repair of one fracture but states the other fracture has to heal on own. She is in CAM boot currently. Taking Oxycodone, Methocarbamol prn and Tylenol for pain relief with mild relief.  She did have ER visit last month with elevated liver enzymes.  Recent CMP 2024 with normal LFTs.  She states prior to ER visit she was drinking alcohol daily for about a month and was taking Tylenol every 2-3 hours for pain until she saw the orthopedic provider.  She had liver ultrasound completed and referral to GI with an appointment scheduled in September of this year.  Last mammogram several years ago.  Last Pap smear many years ago.  Never had a colonoscopy.  She will consider options for colon cancer screening but is unsure how to proceed at this time.  Denies any fever, chills, night sweats, headaches, dizziness, visual changes, shortness of breath, chest pain, abdominal pain, nausea, vomiting, diarrhea, hematochezia, hematuria or vaginal bleeding.    24: Pt for follow up/ lab results. Reviewed all labs in detail. Negative RF/ VINAYAK. She endorses left elbow pain chronic in nature but worsening. Endorses  "swelling to left elbow and limited ROM as she is unable to complete extend. She also reports chronic b/l hand pain with right hand worse. H/o fracture with limited ROM to right thumb s/t injury. She reports chronic mid- low back pain. H/o pelvic fracture sustained from 10 ft fall. She admits to having MRI of pelvis at that time with renal cyst (unsure what side). She reports episode of CP and SOB that will last approximately 4 minutes or so. Takes ASA with relief. Unsure if related to new  using at work as she feels like this is when she initially noted symptoms. She is a smoker. Denies cough/ wheezing. Reviewed mammogram results. No other concerns.      Other providers:   [facility] orthopedic   [facility] GI appointment 2024    Review of Systems     Review of Systems   Constitutional: Negative.    HENT: Negative.     Eyes: Negative.    Respiratory:  Positive for shortness of breath.    Cardiovascular:  Positive for chest pain.   Gastrointestinal: Negative.    Endocrine: Negative.    Genitourinary: Negative.    Musculoskeletal:  Positive for arthralgias and back pain.   Skin: Negative.    Allergic/Immunologic: Negative.    Neurological: Negative.    Hematological: Negative.    Psychiatric/Behavioral: Negative.         Medical / Social / Family History     -------------------------------------    Arthritis        Past Surgical History:   Procedure Laterality Date    ANKLE HARDWARE REMOVAL Left 2024    Procedure: REMOVAL, HARDWARE, ANKLE;  Surgeon: Jared Ibanez MD;  Location: [facility];  Service: Orthopedics;  Laterality: Left;     SECTION      1    left ankle surgery      OPEN REDUCTION AND INTERNAL FIXATION (ORIF) OF PILON FRACTURE Left 2024    Procedure: ORIF, FRACTURE, PILON;  Surgeon: Jared Ibanez MD;  Location: [facility];  Service: Orthopedics;  Laterality: Left;  Open "Broken" Schukla set/hold Screw Removal set    Right habd and wrist scope         Social History     Socioeconomic " "History    Marital status: Single    Number of children: 2    Highest education level: High school graduate   Occupational History     Comment: unemployed   Tobacco Use    Smoking status: Every Day     Current packs/day: 0.50     Average packs/day: 0.5 packs/day for 35.2 years (17.6 ttl pk-yrs)     Types: Cigarettes     Start date: 4/24/1989    Smokeless tobacco: Never   Substance and Sexual Activity    Alcohol use: Not Currently     Alcohol/week: 3.0 standard drinks of alcohol     Types: 3 Glasses of wine per week     Comment: former daily alcohol drinker x 1 month, stopped in april 2024    Drug use: Not Currently        Family History   Problem Relation Name Age of Onset    Lung cancer Mother      Rheum arthritis Father      Diabetes Maternal Grandmother      Rheum arthritis Paternal Grandmother      Rheum arthritis Paternal Great-Grandmother          Medications and Allergies     Medications  Current Outpatient Medications   Medication Instructions    aspirin 81 mg, Oral, 2 times daily    gabapentin (NEURONTIN) 300 mg, Oral, 3 times daily    oxyCODONE (ROXICODONE) 5 mg, Oral, Every 8 hours PRN       Allergies  Review of patient's allergies indicates:   Allergen Reactions    Meloxicam Swelling    Sulfa (sulfonamide antibiotics) Hives     On tongue    Sulfamethoxazole-trimethoprim Hives     On tongue       Physical Examination     Visit Vitals  /88 (BP Location: Right arm, Patient Position: Sitting, BP Method: Medium (Automatic))   Pulse 67   Temp 98.2 °F (36.8 °C) (Oral)   Resp 18   Ht 5' 3" (1.6 m)   Wt 55.3 kg (122 lb)   BMI 21.61 kg/m²       Physical Exam  Constitutional:       General: She is not in acute distress.     Appearance: Normal appearance. She is not ill-appearing or diaphoretic.   HENT:      Head: Normocephalic.   Cardiovascular:      Rate and Rhythm: Normal rate and regular rhythm.      Heart sounds: No murmur heard.  Pulmonary:      Effort: Pulmonary effort is normal. No respiratory " distress.      Breath sounds: Normal breath sounds. No stridor. No wheezing, rhonchi or rales.   Musculoskeletal:      Right hand: Tenderness present. Decreased range of motion.      Left hand: Tenderness present.      Thoracic back: Tenderness present.      Lumbar back: Tenderness present.      Left ankle: Tenderness present.   Skin:     General: Skin is warm and dry.   Neurological:      Mental Status: She is alert and oriented to person, place, and time. Mental status is at baseline.      Coordination: Coordination normal.      Gait: Gait normal.   Psychiatric:         Mood and Affect: Mood normal.         Behavior: Behavior normal.         Thought Content: Thought content normal.         Judgment: Judgment normal.           Results     Lab Results   Component Value Date    WBC 9.80 07/19/2024    RBC 4.60 07/19/2024    HGB 15.2 07/19/2024    HCT 43.8 07/19/2024    MCV 95.2 (H) 07/19/2024    MCH 33.0 (H) 07/19/2024    MCHC 34.7 07/19/2024    RDW 13.6 07/19/2024     07/19/2024    MPV 9.4 07/19/2024     Sodium   Date Value Ref Range Status   07/19/2024 139 136 - 145 mmol/L Final     Potassium   Date Value Ref Range Status   07/19/2024 3.4 (L) 3.5 - 5.1 mmol/L Final     Chloride   Date Value Ref Range Status   07/19/2024 108 (H) 98 - 107 mmol/L Final     CO2   Date Value Ref Range Status   07/19/2024 21 (L) 22 - 29 mmol/L Final     Blood Urea Nitrogen   Date Value Ref Range Status   07/19/2024 13.6 9.8 - 20.1 mg/dL Final     Creatinine   Date Value Ref Range Status   07/19/2024 0.60 0.55 - 1.02 mg/dL Final     Calcium   Date Value Ref Range Status   07/19/2024 9.2 8.4 - 10.2 mg/dL Final     Albumin   Date Value Ref Range Status   07/19/2024 3.9 3.5 - 5.0 g/dL Final     Bilirubin Total   Date Value Ref Range Status   07/19/2024 0.7 <=1.5 mg/dL Final     ALP   Date Value Ref Range Status   07/19/2024 148 40 - 150 unit/L Final     AST   Date Value Ref Range Status   07/19/2024 22 5 - 34 unit/L Final     ALT  "  Date Value Ref Range Status   07/19/2024 13 0 - 55 unit/L Final     Lab Results   Component Value Date    CHOL 158 07/19/2024     Lab Results   Component Value Date    HDL 56 07/19/2024     Lab Results   Component Value Date    LDL 83.00 07/19/2024       Lab Results   Component Value Date    TRIG 95 07/19/2024     No results found for: "CHOLHDL"  Lab Results   Component Value Date    TSH 1.426 07/19/2024     Lab Results   Component Value Date    PHUR 6.0 07/19/2024    PROTEINUA Trace (A) 07/19/2024    GLUCUA Normal 07/19/2024    OCCULTUA Trace (A) 07/19/2024    NITRITE Negative 07/19/2024    LEUKOCYTESUR Negative 07/19/2024     Lab Results   Component Value Date    HGBA1C 4.9 07/19/2024     No results found for: "MICROALBUR", "[id number]"   No results found for this or any previous visit.         Assessment       ICD-10-CM ICD-9-CM   1. Chest pain, unspecified type  R07.9 786.50   2. Chronic bilateral thoracic back pain  M54.6 724.1    G89.29 338.29   3. History of fracture of left ankle  Z87.81 V15.51   4. History of pelvic fracture  Z87.81 V15.51   5. Left elbow pain  M25.522 719.42   6. Renal cyst  N28.1 753.10   7. Bilateral hand pain  M79.641 729.5    M79.642    8. Shortness of breath  R06.02 786.05   9. Cigarette nicotine dependence without complication  F17.210 305.1       Plan       Problem List Items Addressed This Visit          Pulmonary    Shortness of breath    Relevant Orders    X-Ray Chest PA And Lateral    Pulmonary function test    Stress Echo Which stress agent will be used? Pharmacological; Color Flow Doppler? No    Ambulatory referral/consult to Cardiology       Cardiac/Vascular    Chest pain - Primary    Current Assessment & Plan     Pt endorses episodes of CP and SOB x few months with relief with ASA and rest.   EKG in clinic today   Patient unable to ambulate successfully to complete treadmill stress test s/t recovering from left ankle fracture.  Outpatient stress test   ER precautions  Avoid " alcohol/ tobacco abuse          Relevant Orders    X-Ray Chest PA And Lateral    IN OFFICE EKG 12-LEAD (to Muse)    Stress Echo Which stress agent will be used? Pharmacological; Color Flow Doppler? No    Ambulatory referral/consult to Cardiology       Renal/    Renal cyst    Current Assessment & Plan     Reports history of renal cyst per MRI pelvis findings years ago. Do not have records available for review at this time. Renal US ordered          Relevant Orders    US Retroperitoneal Complete (Kidney and       Orthopedic    Bilateral hand pain    Current Assessment & Plan     H/o right hand fracture- refer to prior imaging in chart   Continued b/l hand pain, R>L  Obtain updated imaging studies and referral to [facility] Ortho          Relevant Orders    X-Ray Hand Complete Right    X-Ray Hand 3 view Left    Chronic bilateral thoracic back pain    Current Assessment & Plan     Pt with chronic mid- low back pain without radiculopathy symptoms. XR thoracic/ lumbar spine for further evaluation. Possible PT referral once review.          Relevant Orders    X-Ray Lumbar Spine 5 View    X-Ray Thoracic Spine AP Lateral    History of fracture of left ankle    Current Assessment & Plan     Pt with recent surgery s/t fracture to left ankle (previously fractured same ankle years ago). Currently under [facility] ortho for care.         Relevant Orders    DXA Bone Density Axial Skeleton 1 or more sites    History of pelvic fracture    Current Assessment & Plan     Patient with fall with sustained pelvic fracture years ago. Will obtain DEXA as pt has history of multiple fractures.         Relevant Orders    DXA Bone Density Axial Skeleton 1 or more sites    Left elbow pain    Current Assessment & Plan     Pt with chronic left elbow pain and swelling associated with limited ROM with limited complete extension. Obtain imaging with referral to [facility] Ortho.         Relevant Orders    X-Ray Elbow Complete Left       Other    Cigarette nicotine  dependence without complication    Current Assessment & Plan     0.5 ppd  Discussed for at least 3 minutes importance of smoking cessation and health benefits, including adverse effects to patient's health and organs.  Encouraged cessation.  Per requirements, pt does not meet requirements for LDCT screening approved per insurance            Future Appointments   Date Time Provider Department Center   7/24/2024 12:50 PM [facility] [place]   7/31/2024  9:30 AM RESIDENT 2, [facility] [place]    9/9/2024  9:00 AM Emilee Tran, SHAILAP [facility] [place]    9/24/2024  9:00 AM Emma Rea, NP [facility] [place] [facility]      I spent a total of 45 minutes on the day of the visit.  This includes face to face time and non-face to face time preparing to see the patient (eg, review of tests), obtaining and/or reviewing separately obtained history, documenting clinical information in the electronic or other health record, independently interpreting results and communicating results to the patient/family/caregiver, or care coordinator.      Follow up in about 2 months (around 9/24/2024) for results.    Signature:  Emma Rea NP  [facility]  [address] [facility] [address]    Date of encounter: 7/24/24

## 2024-07-24 NOTE — ASSESSMENT & PLAN NOTE
Pt with recent surgery s/t fracture to left ankle (previously fractured same ankle years ago). Currently under [facility] ortho for care.

## 2024-07-24 NOTE — ASSESSMENT & PLAN NOTE
H/o right hand fracture- refer to prior imaging in chart   Continued b/l hand pain, R>L  Obtain updated imaging studies and referral to [facility] Orth[facility]

## 2024-07-25 ENCOUNTER — TELEPHONE (OUTPATIENT)
Dept: INTERNAL MEDICINE | Facility: CLINIC | Age: 54
End: 2024-07-25
Payer: MEDICAID

## 2024-07-25 DIAGNOSIS — M19.141 POST-TRAUMATIC OSTEOARTHRITIS OF RIGHT HAND: Primary | ICD-10-CM

## 2024-07-25 NOTE — TELEPHONE ENCOUNTER
----- Message from Emma Rea NP sent at 7/25/2024  2:13 PM CDT -----  Please let pt know b/l hand XR reviewed with extensive arthritis noted to right hand. Referral to [facility] Orth[facility]ics ordered for further eval/ treatment.

## 2024-07-25 NOTE — PROGRESS NOTES
Please let pt know b/l hand XR reviewed with extensive arthritis noted to right hand. Referral to [facility] Orthopedics ordered for further eval/ treatment.

## 2024-07-25 NOTE — TELEPHONE ENCOUNTER
----- Message from Emma Rea NP sent at 7/24/2024  2:54 PM CDT -----  Please let patient know chest x-ray results were normal.

## 2024-07-25 NOTE — TELEPHONE ENCOUNTER
Spoke to pt . Informed her of PCP's message below . Pt verbalized understanding.                     --- Message from Emma Rea NP sent at 7/24/2024  2:54 PM CDT -----  Please let patient know chest x-ray results were normal.

## 2024-07-31 ENCOUNTER — HOSPITAL ENCOUNTER (OUTPATIENT)
Dept: RADIOLOGY | Facility: HOSPITAL | Age: 54
Discharge: HOME | End: 2024-07-31
Attending: ORTHOPAEDIC SURGERY
Payer: MEDICAID

## 2024-07-31 ENCOUNTER — OFFICE VISIT (OUTPATIENT)
Dept: ORTHOPEDICS | Facility: CLINIC | Age: 54
End: 2024-07-31
Payer: MEDICAID

## 2024-07-31 VITALS
TEMPERATURE: 98 F | BODY MASS INDEX: 21.62 KG/M2 | HEART RATE: 96 BPM | WEIGHT: 122 LBS | HEIGHT: 63 IN | SYSTOLIC BLOOD PRESSURE: 123 MMHG | DIASTOLIC BLOOD PRESSURE: 83 MMHG

## 2024-07-31 DIAGNOSIS — M25.572 LEFT ANKLE PAIN, UNSPECIFIED CHRONICITY: Primary | ICD-10-CM

## 2024-07-31 DIAGNOSIS — M25.572 LEFT ANKLE PAIN, UNSPECIFIED CHRONICITY: ICD-10-CM

## 2024-07-31 PROCEDURE — 99024 POSTOP FOLLOW-UP VISIT: CPT | Mod: ,,, | Performed by: ORTHOPAEDIC SURGERY

## 2024-07-31 PROCEDURE — 99213 OFFICE O/P EST LOW 20 MIN: CPT | Mod: PBBFAC,25

## 2024-07-31 PROCEDURE — 1159F MED LIST DOCD IN RCRD: CPT | Mod: CPTII,,, | Performed by: ORTHOPAEDIC SURGERY

## 2024-07-31 PROCEDURE — 3074F SYST BP LT 130 MM HG: CPT | Mod: CPTII,,, | Performed by: ORTHOPAEDIC SURGERY

## 2024-07-31 PROCEDURE — 73610 X-RAY EXAM OF ANKLE: CPT | Mod: TC,LT

## 2024-07-31 PROCEDURE — 3079F DIAST BP 80-89 MM HG: CPT | Mod: CPTII,,, | Performed by: ORTHOPAEDIC SURGERY

## 2024-07-31 PROCEDURE — 3044F HG A1C LEVEL LT 7.0%: CPT | Mod: CPTII,,, | Performed by: ORTHOPAEDIC SURGERY

## 2024-07-31 RX ORDER — GABAPENTIN 300 MG/1
300 CAPSULE ORAL 3 TIMES DAILY
Qty: 90 CAPSULE | Refills: 11 | Status: SHIPPED | OUTPATIENT
Start: 2024-07-31 | End: 2025-07-31

## 2024-07-31 RX ORDER — METHOCARBAMOL 750 MG/1
750 TABLET, FILM COATED ORAL 4 TIMES DAILY
COMMUNITY

## 2024-08-01 NOTE — PROGRESS NOTES
"[facility]  NEW Patient Office Visit  2024       Patient ID: Gilma Edmonds  YOB: 1970  MRN: [id number]    Chief Complaint: Pain of the Left Ankle      Orthopaedic Injuries:   Remote hx L ankle medial mal fx w/ retained hardware   2.   L pilon fracture dislocation, associated distal fibula fx (DOI 23)    Orthopaedic Surgeries:   1. Left ankle hardware removal, ORIF pilon- 24 Shamar    HPI:  Gilma Edmonds is a 53 y.o. female with above. Trip and fall.  She has a 1 pack per day current smoker.  Medial malleolus hardware is from , outside hospital.    Interval:  Patient presents to clinic today for follow up, now 3 months postop.  Patient reports he has been doing well, reports she has been ambulating in Cam boot for past 2 weeks with minimal to no pain.  Patient  reports some occasional paresthesias radiating from lateral knee in SP nerve distribution that started about 2 weeks ago when patient began weight-bearing.  Denies any interval trauma.         12 point ROS performed and negative except as above.     Past Medical History:    Past Medical History:   Diagnosis Date    Arthritis      Past Surgical History:   Procedure Laterality Date    ANKLE HARDWARE REMOVAL Left 2024    Procedure: REMOVAL, HARDWARE, ANKLE;  Surgeon: Jared Ibanez MD;  Location: [facility];  Service: Orthopedics;  Laterality: Left;     SECTION      1    left ankle surgery      OPEN REDUCTION AND INTERNAL FIXATION (ORIF) OF PILON FRACTURE Left 2024    Procedure: ORIF, FRACTURE, PILON;  Surgeon: Jared Ibanez MD;  Location: [facility];  Service: Orthopedics;  Laterality: Left;  Open "Broken" Schukla set/hold Screw Removal set    Right habd and wrist scope       Family History   Problem Relation Name Age of Onset    Lung cancer Mother      Rheum arthritis Father      Diabetes Maternal Grandmother      Rheum arthritis Paternal Grandmother      Rheum arthritis " Paternal Great-Grandmother       Social History     Socioeconomic History    Marital status: Single    Number of children: 2    Highest education level: High school graduate   Occupational History     Comment: unemployed   Tobacco Use    Smoking status: Every Day     Current packs/day: 0.50     Average packs/day: 0.5 packs/day for 35.3 years (17.6 ttl pk-yrs)     Types: Cigarettes     Start date: 4/24/1989    Smokeless tobacco: Never   Substance and Sexual Activity    Alcohol use: Not Currently     Alcohol/week: 3.0 standard drinks of alcohol     Types: 3 Glasses of wine per week     Comment: former daily alcohol drinker x 1 month, stopped in april 2024    Drug use: Not Currently     Medication List with Changes/Refills   New Medications    GABAPENTIN (NEURONTIN) 300 MG CAPSULE    Take 1 capsule (300 mg total) by mouth 3 (three) times daily.   Current Medications    ASPIRIN 81 MG CHEW    Take 1 tablet (81 mg total) by mouth 2 (two) times a day.    GABAPENTIN (NEURONTIN) 300 MG CAPSULE    Take 1 capsule (300 mg total) by mouth 3 (three) times daily.    METHOCARBAMOL (ROBAXIN) 750 MG TAB    Take 750 mg by mouth 4 (four) times daily.    OXYCODONE (ROXICODONE) 5 MG IMMEDIATE RELEASE TABLET    Take 1 tablet (5 mg total) by mouth every 8 (eight) hours as needed for Pain.     Review of patient's allergies indicates:   Allergen Reactions    Meloxicam Swelling    Sulfa (sulfonamide antibiotics) Hives     On tongue    Sulfamethoxazole-trimethoprim Hives     On tongue       Physical Exam:  Left lower extremity  Surgical incision is well healed   Minimal to no swelling  Dorsiflexion to neutral  Motor intact TA/GS/EHL/FHL  Sensation intact to light touch in SP/DP/T/SA/MENCHACA  Positive Tinel's over fibula head  Warm well-perfused    Imaging independently interpreted:  X-ray left ankle reveals interval healing with hardware in place without complication, there appears to be slight lucency about most distal horizontal screw but  otherwise no changes compared to previous imaging      Assessment and Plan:    Gilma Edmonds is a 53 y.o. female with left pilon fracture, retained orthopedic hardware.    -recommend continued Cam boot use for next 2 weeks, then can transition out of Cam boot as tolerated into regular shoe  -discussed with the patient paresthesias likely coming from compression of common peroneal nerve about fibular head from Cam boot as paresthesias at started over the past 2 weeks when patient started weightbearing, explained this will likely resolve with discontinuation of Cam boot  -gabapentin prescribed  -if patient continues to have paresthesias at follow up plan to obtain EMG  -PT referral given for ankle strengthening and stretching  -follow up in 2 months    Tevin Denton MD  [facility] PGY3          Orders Placed This Encounter    X-Ray Ankle Complete Left    gabapentin (NEURONTIN) 300 MG capsule

## 2024-08-01 NOTE — PROGRESS NOTES
Faculty Attestation: Gilma Edmonds  was seen at [facility] in the Orthopaedic Clinic. Patient seen and evaluated at the time of the visit. History of Present Illness, Physical Exam, and Assessment and Plan reviewed. Treatment plan is reasonable and appropriate. Compliance with treatment recommendations is important. No procedure was performed.     Markell King MD  Orthopaedic Surgery

## 2024-08-06 ENCOUNTER — HOSPITAL ENCOUNTER (OUTPATIENT)
Dept: RADIOLOGY | Facility: HOSPITAL | Age: 54
Discharge: HOME | End: 2024-08-06
Attending: NURSE PRACTITIONER
Payer: MEDICAID

## 2024-08-06 DIAGNOSIS — Z87.81 HISTORY OF FRACTURE OF LEFT ANKLE: ICD-10-CM

## 2024-08-06 DIAGNOSIS — Z87.81 HISTORY OF PELVIC FRACTURE: ICD-10-CM

## 2024-08-06 PROCEDURE — 77080 DXA BONE DENSITY AXIAL: CPT | Mod: TC

## 2024-08-07 DIAGNOSIS — S32.040D COMPRESSION FRACTURE OF L4 VERTEBRA WITH ROUTINE HEALING: ICD-10-CM

## 2024-08-07 DIAGNOSIS — M25.422 EFFUSION OF LEFT ELBOW: Primary | ICD-10-CM

## 2024-08-07 DIAGNOSIS — M41.54 OTHER SECONDARY SCOLIOSIS, THORACIC REGION: ICD-10-CM

## 2024-08-07 DIAGNOSIS — M51.36 LUMBAR DEGENERATIVE DISC DISEASE: ICD-10-CM

## 2024-08-07 PROBLEM — M51.369 LUMBAR DEGENERATIVE DISC DISEASE: Status: ACTIVE | Noted: 2024-08-07

## 2024-08-08 ENCOUNTER — TELEPHONE (OUTPATIENT)
Dept: INTERNAL MEDICINE | Facility: CLINIC | Age: 54
End: 2024-08-08
Payer: MEDICAID

## 2024-08-13 ENCOUNTER — TELEPHONE (OUTPATIENT)
Dept: INTERNAL MEDICINE | Facility: CLINIC | Age: 54
End: 2024-08-13
Payer: MEDICAID

## 2024-08-13 NOTE — TELEPHONE ENCOUNTER
----- Message from Anirudh Arnold sent at 8/12/2024  2:12 PM CDT -----  .Who Called: Glima Edmonds    Caller is requesting assistance/information from provider's office.    Symptoms (please be specific):    How long has patient had these symptoms:    List of preferred pharmacies on file (remove unneeded): [unfilled]  If different, enter pharmacy into here including location and phone number:       Preferred Method of Contact: Phone Call  Patient's Preferred Phone Number on File: [phone]   Best Call Back Number, if different:  Additional Information: pt called wanting to speak with nurse, could not elaborate reason fr call

## 2024-08-13 NOTE — TELEPHONE ENCOUNTER
----- Message from Harriet Palmer sent at 8/13/2024  9:06 AM CDT -----  Who Called: Gilma Edmonds    Patient is returning phone call    Who Left Message for Patient: jacek  Does the patient know what this is regarding?: medical advice      Preferred Method of Contact: Phone Call  Patient's Preferred Phone Number on File: [phone]   Best Call Back Number, if different:  Additional Information:  return call, please advise, thanks

## 2024-08-13 NOTE — TELEPHONE ENCOUNTER
Was informed having chest pain since yesterday. Pain radiating to back and arm. Asked if any shortness of breath - she stated yes. Instructed to go to the ED to be evaluated. She voiced understanding.  Informed GRETCHEN Rea NP

## 2024-08-14 ENCOUNTER — HOSPITAL ENCOUNTER (INPATIENT)
Facility: HOSPITAL | Age: 54
LOS: 2 days | Discharge: HOSPICE | DRG: 251 | End: 2024-08-16
Attending: EMERGENCY MEDICINE | Admitting: INTERNAL MEDICINE
Payer: MEDICAID

## 2024-08-14 DIAGNOSIS — I24.9 ACS (ACUTE CORONARY SYNDROME): ICD-10-CM

## 2024-08-14 DIAGNOSIS — I21.4 NSTEMI (NON-ST ELEVATED MYOCARDIAL INFARCTION): ICD-10-CM

## 2024-08-14 DIAGNOSIS — R07.89 OTHER CHEST PAIN: Primary | ICD-10-CM

## 2024-08-14 DIAGNOSIS — R07.9 CHEST PAIN: ICD-10-CM

## 2024-08-14 PROBLEM — F17.200 TOBACCO DEPENDENCY: Status: ACTIVE | Noted: 2024-08-14

## 2024-08-14 LAB
ALBUMIN SERPL-MCNC: 3.7 G/DL (ref 3.5–5)
ALBUMIN/GLOB SERPL: 1.2 RATIO (ref 1.1–2)
ALP SERPL-CCNC: 121 UNIT/L (ref 40–150)
ALT SERPL-CCNC: 19 UNIT/L (ref 0–55)
ANION GAP SERPL CALC-SCNC: 14 MEQ/L
APICAL FOUR CHAMBER EJECTION FRACTION: 59 %
APICAL TWO CHAMBER EJECTION FRACTION: 62 %
APTT PPP: 37.1 SECONDS (ref 23.2–33.7)
AST SERPL-CCNC: 31 UNIT/L (ref 5–34)
AV INDEX (PROSTH): 0.99
AV MEAN GRADIENT: 3 MMHG
AV PEAK GRADIENT: 4 MMHG
AV VALVE AREA BY VELOCITY RATIO: 2.96 CM²
AV VALVE AREA: 3.08 CM²
AV VELOCITY RATIO: 0.95
BASOPHILS # BLD AUTO: 0.05 X10(3)/MCL
BASOPHILS # BLD AUTO: 0.05 X10(3)/MCL
BASOPHILS NFR BLD AUTO: 0.5 %
BASOPHILS NFR BLD AUTO: 0.5 %
BILIRUB SERPL-MCNC: 0.6 MG/DL
BNP BLD-MCNC: 84.5 PG/ML
BSA FOR ECHO PROCEDURE: 1.54 M2
BUN SERPL-MCNC: 13.1 MG/DL (ref 9.8–20.1)
CALCIUM SERPL-MCNC: 9.3 MG/DL (ref 8.4–10.2)
CHLORIDE SERPL-SCNC: 106 MMOL/L (ref 98–107)
CO2 SERPL-SCNC: 19 MMOL/L (ref 22–29)
CREAT SERPL-MCNC: 0.57 MG/DL (ref 0.55–1.02)
CREAT/UREA NIT SERPL: 23
CV ECHO LV RWT: 0.48 CM
DOP CALC AO PEAK VEL: 1.05 M/S
DOP CALC AO VTI: 21.7 CM
DOP CALC LVOT AREA: 3.1 CM2
DOP CALC LVOT DIAMETER: 1.99 CM
DOP CALC LVOT PEAK VEL: 1 M/S
DOP CALC LVOT STROKE VOLUME: 66.84 CM3
DOP CALC MV VTI: 30 CM
DOP CALCLVOT PEAK VEL VTI: 21.5 CM
E WAVE DECELERATION TIME: 237.59 MSEC
E/A RATIO: 1.26
ECHO LV POSTERIOR WALL: 0.9 CM (ref 0.6–1.1)
EOSINOPHIL # BLD AUTO: 0.17 X10(3)/MCL (ref 0–0.9)
EOSINOPHIL # BLD AUTO: 0.21 X10(3)/MCL (ref 0–0.9)
EOSINOPHIL NFR BLD AUTO: 1.7 %
EOSINOPHIL NFR BLD AUTO: 2.1 %
ERYTHROCYTE [DISTWIDTH] IN BLOOD BY AUTOMATED COUNT: 14.2 % (ref 11.5–17)
ERYTHROCYTE [DISTWIDTH] IN BLOOD BY AUTOMATED COUNT: 14.5 % (ref 11.5–17)
ETHANOL SERPL-MCNC: 39 MG/DL
FRACTIONAL SHORTENING: 26 % (ref 28–44)
GFR SERPLBLD CREATININE-BSD FMLA CKD-EPI: >60 ML/MIN/1.73/M2
GLOBULIN SER-MCNC: 3.2 GM/DL (ref 2.4–3.5)
GLUCOSE SERPL-MCNC: 114 MG/DL (ref 74–100)
HCT VFR BLD AUTO: 40.7 % (ref 37–47)
HCT VFR BLD AUTO: 43.7 % (ref 37–47)
HGB BLD-MCNC: 14.1 G/DL (ref 12–16)
HGB BLD-MCNC: 15.6 G/DL (ref 12–16)
HOLD SPECIMEN: NORMAL
HR MV ECHO: 54 BPM
IMM GRANULOCYTES # BLD AUTO: 0.03 X10(3)/MCL (ref 0–0.04)
IMM GRANULOCYTES # BLD AUTO: 0.03 X10(3)/MCL (ref 0–0.04)
IMM GRANULOCYTES NFR BLD AUTO: 0.3 %
IMM GRANULOCYTES NFR BLD AUTO: 0.3 %
INR PPP: 1
INR PPP: 1
INTERVENTRICULAR SEPTUM: 0.88 CM (ref 0.6–1.1)
IVC DIAMETER: 1.9 CM
LEFT ATRIUM AREA SYSTOLIC (APICAL 4 CHAMBER): 10.52 CM2
LEFT ATRIUM SIZE: 2.8 CM
LEFT INTERNAL DIMENSION IN SYSTOLE: 2.74 CM (ref 2.1–4)
LEFT VENTRICLE DIASTOLIC VOLUME INDEX: 38.35 ML/M2
LEFT VENTRICLE DIASTOLIC VOLUME: 59.06 ML
LEFT VENTRICLE END DIASTOLIC VOLUME APICAL 2 CHAMBER: 57.51 ML
LEFT VENTRICLE END DIASTOLIC VOLUME APICAL 4 CHAMBER: 48.92 ML
LEFT VENTRICLE END SYSTOLIC VOLUME APICAL 4 CHAMBER: 17.09 ML
LEFT VENTRICLE MASS INDEX: 62 G/M2
LEFT VENTRICLE SYSTOLIC VOLUME INDEX: 18.1 ML/M2
LEFT VENTRICLE SYSTOLIC VOLUME: 27.95 ML
LEFT VENTRICULAR INTERNAL DIMENSION IN DIASTOLE: 3.72 CM (ref 3.5–6)
LEFT VENTRICULAR MASS: 96.19 G
LV LATERAL E/E' RATIO: 6.18 M/S
LVED V (TEICH): 59.06 ML
LVES V (TEICH): 27.95 ML
LVOT MG: 1.99 MMHG
LVOT MV: 0.65 CM/S
LYMPHOCYTES # BLD AUTO: 1.31 X10(3)/MCL (ref 0.6–4.6)
LYMPHOCYTES # BLD AUTO: 3.76 X10(3)/MCL (ref 0.6–4.6)
LYMPHOCYTES NFR BLD AUTO: 13.4 %
LYMPHOCYTES NFR BLD AUTO: 38.4 %
MAGNESIUM SERPL-MCNC: 1.9 MG/DL (ref 1.6–2.6)
MCH RBC QN AUTO: 33.6 PG (ref 27–31)
MCH RBC QN AUTO: 33.8 PG (ref 27–31)
MCHC RBC AUTO-ENTMCNC: 34.6 G/DL (ref 33–36)
MCHC RBC AUTO-ENTMCNC: 35.7 G/DL (ref 33–36)
MCV RBC AUTO: 94.8 FL (ref 80–94)
MCV RBC AUTO: 96.9 FL (ref 80–94)
MONOCYTES # BLD AUTO: 0.8 X10(3)/MCL (ref 0.1–1.3)
MONOCYTES # BLD AUTO: 0.97 X10(3)/MCL (ref 0.1–1.3)
MONOCYTES NFR BLD AUTO: 8.2 %
MONOCYTES NFR BLD AUTO: 9.9 %
MV MEAN GRADIENT: 1 MMHG
MV PEAK A VEL: 0.54 M/S
MV PEAK E VEL: 0.68 M/S
MV PEAK GRADIENT: 3 MMHG
MV VALVE AREA BY CONTINUITY EQUATION: 2.23 CM2
NEUTROPHILS # BLD AUTO: 4.93 X10(3)/MCL (ref 2.1–9.2)
NEUTROPHILS # BLD AUTO: 7.26 X10(3)/MCL (ref 2.1–9.2)
NEUTROPHILS NFR BLD AUTO: 50.5 %
NEUTROPHILS NFR BLD AUTO: 74.2 %
NRBC BLD AUTO-RTO: 0 %
NRBC BLD AUTO-RTO: 0 %
OHS LV EJECTION FRACTION SIMPSONS BIPLANE MOD: 63 %
OHS QRS DURATION: 90 MS
OHS QTC CALCULATION: 474 MS
PISA MRMAX VEL: 5.16 M/S
PISA TR MAX VEL: 2.16 M/S
PLATELET # BLD AUTO: 293 X10(3)/MCL (ref 130–400)
PLATELET # BLD AUTO: 346 X10(3)/MCL (ref 130–400)
PMV BLD AUTO: 9.3 FL (ref 7.4–10.4)
PMV BLD AUTO: 9.5 FL (ref 7.4–10.4)
POCT GLUCOSE: 120 MG/DL (ref 70–110)
POCT GLUCOSE: 92 MG/DL (ref 70–110)
POTASSIUM SERPL-SCNC: 3.2 MMOL/L (ref 3.5–5.1)
PROT SERPL-MCNC: 6.9 GM/DL (ref 6.4–8.3)
PROTHROMBIN TIME: 13.5 SECONDS (ref 11.4–14)
PROTHROMBIN TIME: 13.7 SECONDS (ref 11.4–14)
RA MAJOR: 3.5 CM
RA PRESSURE ESTIMATED: 8 MMHG
RBC # BLD AUTO: 4.2 X10(6)/MCL (ref 4.2–5.4)
RBC # BLD AUTO: 4.61 X10(6)/MCL (ref 4.2–5.4)
RV TB RVSP: 10 MMHG
SODIUM SERPL-SCNC: 139 MMOL/L (ref 136–145)
TDI LATERAL: 0.11 M/S
TR MAX PG: 19 MMHG
TRICUSPID ANNULAR PLANE SYSTOLIC EXCURSION: 1.96 CM
TROPONIN I SERPL-MCNC: 0.82 NG/ML (ref 0–0.04)
TROPONIN I SERPL-MCNC: 1.8 NG/ML (ref 0–0.04)
TROPONIN I SERPL-MCNC: 2.35 NG/ML (ref 0–0.04)
TROPONIN I SERPL-MCNC: 2.49 NG/ML (ref 0–0.04)
TROPONIN I SERPL-MCNC: 2.69 NG/ML (ref 0–0.04)
TV REST PULMONARY ARTERY PRESSURE: 27 MMHG
WBC # BLD AUTO: 9.78 X10(3)/MCL (ref 4.5–11.5)
WBC # BLD AUTO: 9.79 X10(3)/MCL (ref 4.5–11.5)
Z-SCORE OF LEFT VENTRICULAR DIMENSION IN END DIASTOLE: -1.85
Z-SCORE OF LEFT VENTRICULAR DIMENSION IN END SYSTOLE: -0.11

## 2024-08-14 PROCEDURE — 93005 ELECTROCARDIOGRAM TRACING: CPT

## 2024-08-14 PROCEDURE — 83735 ASSAY OF MAGNESIUM: CPT

## 2024-08-14 PROCEDURE — 85610 PROTHROMBIN TIME: CPT

## 2024-08-14 PROCEDURE — 96374 THER/PROPH/DIAG INJ IV PUSH: CPT

## 2024-08-14 PROCEDURE — 63600175 PHARM REV CODE 636 W HCPCS

## 2024-08-14 PROCEDURE — 84484 ASSAY OF TROPONIN QUANT: CPT

## 2024-08-14 PROCEDURE — 21400001 HC TELEMETRY ROOM

## 2024-08-14 PROCEDURE — 83880 ASSAY OF NATRIURETIC PEPTIDE: CPT

## 2024-08-14 PROCEDURE — 82077 ASSAY SPEC XCP UR&BREATH IA: CPT

## 2024-08-14 PROCEDURE — 25000003 PHARM REV CODE 250

## 2024-08-14 PROCEDURE — 25000242 PHARM REV CODE 250 ALT 637 W/ HCPCS

## 2024-08-14 PROCEDURE — 85730 THROMBOPLASTIN TIME PARTIAL: CPT

## 2024-08-14 PROCEDURE — 96361 HYDRATE IV INFUSION ADD-ON: CPT

## 2024-08-14 PROCEDURE — 99285 EMERGENCY DEPT VISIT HI MDM: CPT | Mod: 25

## 2024-08-14 PROCEDURE — 85025 COMPLETE CBC W/AUTO DIFF WBC: CPT

## 2024-08-14 PROCEDURE — 80053 COMPREHEN METABOLIC PANEL: CPT

## 2024-08-14 PROCEDURE — 36415 COLL VENOUS BLD VENIPUNCTURE: CPT | Performed by: INTERNAL MEDICINE

## 2024-08-14 PROCEDURE — 36415 COLL VENOUS BLD VENIPUNCTURE: CPT

## 2024-08-14 RX ORDER — CLOPIDOGREL BISULFATE 75 MG/1
75 TABLET ORAL DAILY
Status: DISCONTINUED | OUTPATIENT
Start: 2024-08-15 | End: 2024-08-14

## 2024-08-14 RX ORDER — NITROGLYCERIN 0.4 MG/1
0.4 TABLET SUBLINGUAL EVERY 5 MIN PRN
Status: DISCONTINUED | OUTPATIENT
Start: 2024-08-14 | End: 2024-08-16 | Stop reason: HOSPADM

## 2024-08-14 RX ORDER — ENOXAPARIN SODIUM 100 MG/ML
40 INJECTION SUBCUTANEOUS EVERY 24 HOURS
Status: DISCONTINUED | OUTPATIENT
Start: 2024-08-14 | End: 2024-08-14

## 2024-08-14 RX ORDER — CLOPIDOGREL BISULFATE 300 MG/1
TABLET, FILM COATED ORAL
Status: DISCONTINUED
Start: 2024-08-14 | End: 2024-08-14 | Stop reason: WASHOUT

## 2024-08-14 RX ORDER — NAPROXEN SODIUM 220 MG/1
81 TABLET, FILM COATED ORAL 2 TIMES DAILY
Status: DISCONTINUED | OUTPATIENT
Start: 2024-08-15 | End: 2024-08-14

## 2024-08-14 RX ORDER — NALOXONE HCL 0.4 MG/ML
0.02 VIAL (ML) INJECTION
Status: DISCONTINUED | OUTPATIENT
Start: 2024-08-14 | End: 2024-08-16 | Stop reason: HOSPADM

## 2024-08-14 RX ORDER — ONDANSETRON HYDROCHLORIDE 2 MG/ML
4 INJECTION, SOLUTION INTRAVENOUS
Status: COMPLETED | OUTPATIENT
Start: 2024-08-14 | End: 2024-08-14

## 2024-08-14 RX ORDER — GLUCAGON 1 MG
1 KIT INJECTION
Status: DISCONTINUED | OUTPATIENT
Start: 2024-08-14 | End: 2024-08-16 | Stop reason: HOSPADM

## 2024-08-14 RX ORDER — NITROGLYCERIN 0.4 MG/1
0.4 TABLET SUBLINGUAL
Status: COMPLETED | OUTPATIENT
Start: 2024-08-14 | End: 2024-08-14

## 2024-08-14 RX ORDER — IBUPROFEN 200 MG
24 TABLET ORAL
Status: DISCONTINUED | OUTPATIENT
Start: 2024-08-14 | End: 2024-08-16 | Stop reason: HOSPADM

## 2024-08-14 RX ORDER — SODIUM CHLORIDE, SODIUM LACTATE, POTASSIUM CHLORIDE, CALCIUM CHLORIDE 600; 310; 30; 20 MG/100ML; MG/100ML; MG/100ML; MG/100ML
INJECTION, SOLUTION INTRAVENOUS CONTINUOUS
Status: DISCONTINUED | OUTPATIENT
Start: 2024-08-14 | End: 2024-08-16

## 2024-08-14 RX ORDER — HYDRALAZINE HYDROCHLORIDE 20 MG/ML
10 INJECTION INTRAMUSCULAR; INTRAVENOUS EVERY 4 HOURS PRN
Status: DISCONTINUED | OUTPATIENT
Start: 2024-08-14 | End: 2024-08-16 | Stop reason: HOSPADM

## 2024-08-14 RX ORDER — CLOPIDOGREL BISULFATE 75 MG/1
75 TABLET ORAL DAILY
Status: CANCELLED | OUTPATIENT
Start: 2024-08-14 | End: 2024-09-13

## 2024-08-14 RX ORDER — IBUPROFEN 200 MG
1 TABLET ORAL DAILY PRN
Status: CANCELLED | OUTPATIENT
Start: 2024-08-14

## 2024-08-14 RX ORDER — NAPROXEN SODIUM 220 MG/1
320 TABLET, FILM COATED ORAL ONCE
Status: COMPLETED | OUTPATIENT
Start: 2024-08-14 | End: 2024-08-14

## 2024-08-14 RX ORDER — HEPARIN SODIUM,PORCINE/D5W 25000/250
0-40 INTRAVENOUS SOLUTION INTRAVENOUS CONTINUOUS
Status: DISCONTINUED | OUTPATIENT
Start: 2024-08-14 | End: 2024-08-15

## 2024-08-14 RX ORDER — LABETALOL HCL 20 MG/4 ML
10 SYRINGE (ML) INTRAVENOUS EVERY 4 HOURS PRN
Status: DISCONTINUED | OUTPATIENT
Start: 2024-08-14 | End: 2024-08-16 | Stop reason: HOSPADM

## 2024-08-14 RX ORDER — NAPROXEN SODIUM 220 MG/1
81 TABLET, FILM COATED ORAL DAILY
Status: DISCONTINUED | OUTPATIENT
Start: 2024-08-15 | End: 2024-08-16 | Stop reason: HOSPADM

## 2024-08-14 RX ORDER — CLOPIDOGREL BISULFATE 300 MG/1
300 TABLET, FILM COATED ORAL ONCE
Status: COMPLETED | OUTPATIENT
Start: 2024-08-14 | End: 2024-08-14

## 2024-08-14 RX ORDER — NAPROXEN SODIUM 220 MG/1
320 TABLET, FILM COATED ORAL DAILY
Status: DISCONTINUED | OUTPATIENT
Start: 2024-08-15 | End: 2024-08-14

## 2024-08-14 RX ORDER — IBUPROFEN 200 MG
16 TABLET ORAL
Status: DISCONTINUED | OUTPATIENT
Start: 2024-08-14 | End: 2024-08-16 | Stop reason: HOSPADM

## 2024-08-14 RX ORDER — ACETAMINOPHEN 325 MG/1
650 TABLET ORAL EVERY 4 HOURS PRN
Status: DISCONTINUED | OUTPATIENT
Start: 2024-08-14 | End: 2024-08-16 | Stop reason: HOSPADM

## 2024-08-14 RX ORDER — GABAPENTIN 300 MG/1
300 CAPSULE ORAL 3 TIMES DAILY
Status: DISCONTINUED | OUTPATIENT
Start: 2024-08-14 | End: 2024-08-16 | Stop reason: HOSPADM

## 2024-08-14 RX ORDER — SODIUM CHLORIDE 0.9 % (FLUSH) 0.9 %
10 SYRINGE (ML) INJECTION EVERY 12 HOURS PRN
Status: DISCONTINUED | OUTPATIENT
Start: 2024-08-14 | End: 2024-08-16 | Stop reason: HOSPADM

## 2024-08-14 RX ORDER — ASPIRIN 325 MG
325 TABLET ORAL
Status: DISCONTINUED | OUTPATIENT
Start: 2024-08-14 | End: 2024-08-14

## 2024-08-14 RX ORDER — POTASSIUM CHLORIDE 20 MEQ/1
40 TABLET, EXTENDED RELEASE ORAL ONCE
Status: COMPLETED | OUTPATIENT
Start: 2024-08-14 | End: 2024-08-14

## 2024-08-14 RX ORDER — TALC
6 POWDER (GRAM) TOPICAL NIGHTLY PRN
Status: DISCONTINUED | OUTPATIENT
Start: 2024-08-14 | End: 2024-08-16 | Stop reason: HOSPADM

## 2024-08-14 RX ADMIN — ENOXAPARIN SODIUM 40 MG: 40 INJECTION SUBCUTANEOUS at 05:08

## 2024-08-14 RX ADMIN — POTASSIUM CHLORIDE 40 MEQ: 1500 TABLET, EXTENDED RELEASE ORAL at 03:08

## 2024-08-14 RX ADMIN — MELATONIN TAB 3 MG 6 MG: 3 TAB at 08:08

## 2024-08-14 RX ADMIN — SODIUM CHLORIDE, POTASSIUM CHLORIDE, SODIUM LACTATE AND CALCIUM CHLORIDE 500 ML: 600; 310; 30; 20 INJECTION, SOLUTION INTRAVENOUS at 09:08

## 2024-08-14 RX ADMIN — ACETAMINOPHEN 650 MG: 325 TABLET, FILM COATED ORAL at 08:08

## 2024-08-14 RX ADMIN — CLOPIDOGREL BISULFATE 300 MG: 300 TABLET, FILM COATED ORAL at 04:08

## 2024-08-14 RX ADMIN — HEPARIN SODIUM 12 UNITS/KG/HR: 10000 INJECTION, SOLUTION INTRAVENOUS at 08:08

## 2024-08-14 RX ADMIN — GABAPENTIN 300 MG: 300 CAPSULE ORAL at 03:08

## 2024-08-14 RX ADMIN — ASPIRIN 81 MG 324 MG: 81 TABLET ORAL at 04:08

## 2024-08-14 RX ADMIN — NITROGLYCERIN 0.4 MG: 0.4 TABLET SUBLINGUAL at 08:08

## 2024-08-14 RX ADMIN — ONDANSETRON 4 MG: 2 INJECTION INTRAMUSCULAR; INTRAVENOUS at 08:08

## 2024-08-14 RX ADMIN — NITROGLYCERIN 0.4 MG: 0.4 TABLET SUBLINGUAL at 09:08

## 2024-08-14 RX ADMIN — SODIUM CHLORIDE, POTASSIUM CHLORIDE, SODIUM LACTATE AND CALCIUM CHLORIDE: 600; 310; 30; 20 INJECTION, SOLUTION INTRAVENOUS at 05:08

## 2024-08-14 RX ADMIN — GABAPENTIN 300 MG: 300 CAPSULE ORAL at 08:08

## 2024-08-14 NOTE — Clinical Note
The catheter was removed from the aorta. Scheduling Instructions (Optional): patient will rtc when she gets back from [place] Detail Level: Detailed Procedure To Be Performed At Next Visit: Cryotherapy

## 2024-08-14 NOTE — PLAN OF CARE
Problem: Adult Inpatient Plan of Care  Goal: Plan of Care Review  Outcome: Progressing  Goal: Patient-Specific Goal (Individualized)  Outcome: Progressing  Goal: Absence of Hospital-Acquired Illness or Injury  Outcome: Progressing  Goal: Optimal Comfort and Wellbeing  Outcome: Progressing  Goal: Readiness for Transition of Care  Outcome: Progressing     Problem: Wound  Goal: Optimal Coping  Outcome: Progressing  Goal: Optimal Functional Ability  Outcome: Progressing  Goal: Absence of Infection Signs and Symptoms  Outcome: Progressing  Goal: Improved Oral Intake  Outcome: Progressing  Goal: Optimal Pain Control and Function  Outcome: Progressing  Goal: Skin Health and Integrity  Outcome: Progressing  Goal: Optimal Wound Healing  Outcome: Progressing     Problem: Chest Pain  Goal: Resolution of Chest Pain Symptoms  Outcome: Progressing

## 2024-08-14 NOTE — Clinical Note
67 ml of contrast were injected throughout the case. 10 mL of contrast was the total wasted during the case. 77 mL was the total amount used during the case.

## 2024-08-14 NOTE — ED PROVIDER NOTES
Encounter Date: 2024       History     Chief Complaint   Patient presents with    Chest Pain     Midline chest pain with radiating to back and down bilateral arms worse for the past 3 days.      The history is provided by the patient.   Chest Pain  The current episode started several days ago (Intermittent sernal pain for three months, became constant three days ago.). Duration of episode(s) is 3 days. Chest pain occurs constantly. The chest pain is unchanged. The pain is associated with exertion. At its most intense, the chest pain is at 9/10. The chest pain is currently at 3/10. The quality of the pain is described as sharp and heavy. The pain does not radiate. Exacerbated by: Patient did drink whiskey this morning, she thought it would thin her blood and help her pain. Primary symptoms include cough and nausea. Pertinent negatives for primary symptoms include no fever, no fatigue, no syncope, no shortness of breath, no wheezing, no palpitations, no abdominal pain, no vomiting, no dizziness and no altered mental status.   The cough is chronic. The cough is harsh and productive. The sputum is white. It is exacerbated by smoking.   Nausea began 3 to 5 days ago. The nausea is associated with alcohol use and missed meals (Intermittent nausea with chest pain). The nausea is exacerbated by food and activity.   Pertinent negatives for associated symptoms include no claudication, no diaphoresis, no lower extremity edema, no near-syncope, no numbness, no orthopnea, no paroxysmal nocturnal dyspnea and no weakness. She tried nothing for the symptoms. Risk factors include alcohol intake, lack of exercise, sedentary lifestyle, smoking/tobacco exposure, stress and substance abuse.   Her family medical history is significant for CAD, heart disease, early MI and stroke. Family history comments: Mother early 50s CVA,  age 92, Sister MI age 52, alive 56     Review of patient's allergies indicates:   Allergen Reactions  "   Meloxicam Swelling    Sulfa (sulfonamide antibiotics) Hives     On tongue    Sulfamethoxazole-trimethoprim Hives     On tongue     Past Medical History:   Diagnosis Date    Arthritis      Past Surgical History:   Procedure Laterality Date    ANKLE HARDWARE REMOVAL Left 2024    Procedure: REMOVAL, HARDWARE, ANKLE;  Surgeon: Jared Ibanez MD;  Location: [facility];  Service: Orthopedics;  Laterality: Left;     SECTION      1    left ankle surgery      OPEN REDUCTION AND INTERNAL FIXATION (ORIF) OF PILON FRACTURE Left 2024    Procedure: ORIF, FRACTURE, PILON;  Surgeon: Jared Ibanez MD;  Location: [facility];  Service: Orthopedics;  Laterality: Left;  Open "Broken" Schukla set/hold Screw Removal set    Right habd and wrist scope       Family History   Problem Relation Name Age of Onset    Lung cancer Mother      Rheum arthritis Father      Diabetes Maternal Grandmother      Rheum arthritis Paternal Grandmother      Rheum arthritis Paternal Great-Grandmother       Social History     Tobacco Use    Smoking status: Every Day     Current packs/day: 1.00     Average packs/day: 0.5 packs/day for 35.3 years (18.5 ttl pk-yrs)     Types: Cigarettes     Start date: 1989    Smokeless tobacco: Never   Substance Use Topics    Alcohol use: Yes     Alcohol/week: 4.0 standard drinks of alcohol     Types: 3 Glasses of wine, 1 Shots of liquor per week     Comment: former daily alcohol drinker x 1 month, stopped in 2024    Drug use: Not Currently     Review of Systems   Constitutional: Negative.  Negative for activity change, appetite change, chills, diaphoresis, fatigue and fever.   HENT: Negative.     Eyes: Negative.    Respiratory:  Positive for cough. Negative for choking, chest tightness, shortness of breath, wheezing and stridor.    Cardiovascular:  Positive for chest pain. Negative for palpitations, orthopnea, claudication, leg swelling, syncope and near-syncope.   Gastrointestinal:  Positive " for nausea. Negative for abdominal pain, anal bleeding, blood in stool, constipation, diarrhea and vomiting.   Genitourinary: Negative.    Musculoskeletal: Negative.    Skin: Negative.    Neurological: Negative.  Negative for dizziness, weakness and numbness.   Psychiatric/Behavioral: Negative.     All other systems reviewed and are negative.      Physical Exam     Initial Vitals [08/14/24 0739]   BP Pulse Resp Temp SpO2   114/81 104 20 97.8 °F (36.6 °C) 97 %      MAP       --         Physical Exam    Nursing note and vitals reviewed.  Constitutional: Vital signs are normal. She appears well-developed and well-nourished. She is not diaphoretic. She is cooperative.  Non-toxic appearance. She does not have a sickly appearance. She does not appear ill. No distress.   HENT:   Head: Normocephalic and atraumatic.   Right Ear: Hearing normal.   Left Ear: Hearing normal.   Nose: Nose normal.   Mouth/Throat: Uvula is midline and oropharynx is clear and moist.   Eyes: Conjunctivae and EOM are normal. Pupils are equal, round, and reactive to light.   Neck: Trachea normal and phonation normal. Neck supple.   Normal range of motion.  Cardiovascular:  Normal rate, regular rhythm, S1 normal, S2 normal, normal heart sounds, intact distal pulses and normal pulses.           NSR     Pulmonary/Chest: Effort normal. No accessory muscle usage. No tachypnea and no bradypnea. No respiratory distress. She has decreased breath sounds in the right lower field and the left lower field. She has no wheezes. She has no rhonchi. She has no rales.   Normal effort, symmetrical chest rise and fall, no accessory muscle use. Bilateral clear breath sounds in all fields anterior and posterior. Decreased breath sounds at bases      Abdominal: Abdomen is soft. Bowel sounds are normal. There is no abdominal tenderness.   Abdomen is soft, nontender, no peritoneal signs. Tolerating PO fluids.      Musculoskeletal:         General: No tenderness or edema.  Normal range of motion.      Cervical back: Normal range of motion and neck supple.     Neurological: She is alert and oriented to person, place, and time. She has normal strength. GCS score is 15. GCS eye subscore is 4. GCS verbal subscore is 5. GCS motor subscore is 6.   Skin: Skin is warm, dry and intact. Capillary refill takes less than 2 seconds. No pallor.   Psychiatric: She has a normal mood and affect. Thought content normal.         ED Course   Procedures  Labs Reviewed   COMPREHENSIVE METABOLIC PANEL - Abnormal       Result Value    Sodium 139      Potassium 3.2 (*)     Chloride 106      CO2 19 (*)     Glucose 114 (*)     Blood Urea Nitrogen 13.1      Creatinine 0.57      Calcium 9.3      Protein Total 6.9      Albumin 3.7      Globulin 3.2      Albumin/Globulin Ratio 1.2      Bilirubin Total 0.6            ALT 19      AST 31      eGFR >60      Anion Gap 14.0      BUN/Creatinine Ratio 23     TROPONIN I - Abnormal    Troponin-I 0.819 (*)    CBC WITH DIFFERENTIAL - Abnormal    WBC 9.79      RBC 4.61      Hgb 15.6      Hct 43.7      MCV 94.8 (*)     MCH 33.8 (*)     MCHC 35.7      RDW 14.2      Platelet 346      MPV 9.5      Neut % 74.2      Lymph % 13.4      Mono % 9.9      Eos % 1.7      Basophil % 0.5      Lymph # 1.31      Neut # 7.26      Mono # 0.97      Eos # 0.17      Baso # 0.05      IG# 0.03      IG% 0.3      NRBC% 0.0     ALCOHOL,MEDICAL (ETHANOL) - Abnormal    Ethanol Level 39.0 (*)    TROPONIN I - Abnormal    Troponin-I 1.805 (*)    B-TYPE NATRIURETIC PEPTIDE - Normal    Natriuretic Peptide 84.5     PROTIME-INR - Normal    PT 13.5      INR 1.0     CBC W/ AUTO DIFFERENTIAL    Narrative:     The following orders were created for panel order CBC auto differential.  Procedure                               Abnormality         Status                     ---------                               -----------         ------                     CBC with Differential[[id number]]       Abnormal             Final result                 Please view results for these tests on the individual orders.   EXTRA TUBES    Narrative:     The following orders were created for panel order EXTRA TUBES.  Procedure                               Abnormality         Status                     ---------                               -----------         ------                     Light Blue Top Hold[[id number]]                             Final result               Gold Top Hold[[id number]]                                   Final result               Pink Top Hold[[id number]]                                   Final result                 Please view results for these tests on the individual orders.   LIGHT BLUE TOP HOLD    Extra Tube Hold for add-ons.     GOLD TOP HOLD    Extra Tube Hold for add-ons.     PINK TOP HOLD    Extra Tube Hold for add-ons.       EKG Readings: (Independently Interpreted)   Initial Reading: No STEMI. Rhythm: Normal Sinus Rhythm. Ectopy: No Ectopy. Conduction: Normal. ST Segments: Normal ST Segments. T Waves: Normal. Clinical Impression: Normal Sinus Rhythm   Reviewed with Dr Clark     ECG Results              EKG 12-lead (In process)        Collection Time Result Time QRS Duration OHS QTC Calculation    08/14/24 07:40:49 08/14/24 08:03:20 90 474                     In process by Interface, Lab In [facility] (08/14/24 08:03:27)                   Narrative:    Test Reason : R07.9,    Vent. Rate : 092 BPM     Atrial Rate : 092 BPM     P-R Int : 132 ms          QRS Dur : 090 ms      QT Int : 384 ms       P-R-T Axes : 062 086 002 degrees     QTc Int : 474 ms    Normal sinus rhythm  Normal ECG  When compared with ECG of 24-JUL-2024 12:15,  Vent. rate has increased BY  35 BPM  T wave inversion now evident in Inferior leads  T wave amplitude has decreased in Lateral leads    Referred By:             Confirmed By:                                   Imaging Results              X-Ray Chest PA And Lateral (Final result)   Result time 08/14/24 08:33:23      Final result by Dioni Dewitt MD (08/14/24 08:33:23)                   Impression:      No acute cardiopulmonary process identified.      Electronically signed by: Dioni Dewitt  Date:    08/14/2024  Time:    08:33               Narrative:    EXAMINATION:  XR CHEST PA AND LATERAL    CLINICAL HISTORY:  Chest Pain;    COMPARISON:  July 24, 2024.    FINDINGS:  Cardiopericardial silhouette is within normal limits.  No acute dense focal or segmental consolidation, congestion, pleural effusion or pneumothorax.  There is pectus excavatum deformity.                                       Medications   aspirin tablet 325 mg (has no administration in time range)   ondansetron injection 4 mg (4 mg Intravenous Given 8/14/24 0856)   nitroGLYCERIN SL tablet 0.4 mg (0.4 mg Sublingual Given 8/14/24 0987)   lactated ringers bolus 500 mL (0 mLs Intravenous Stopped 8/14/24 1051)     Medical Decision Making  Miocardial infarction, pneumothorax, aortic dissection, pulmonary emboli, pneumonia, among others    Social  + Tobacco use daily  +Etoh daily  + marijuana    Chest Pain work up  EKG normal sinus  Troponin elevated 0.819     I reassessed the pt. The pt is resting comfortably and is NAD. Discussed test results, shared tx plan of admission for a chest pain work up, she is agreeable to admission. She is pain free currently, no nausea.  Pt has remained stable through her ED course.      Discussed case with Dr Clark, contacted internal medicine for admission. Dr Doyle accepted patient.     Amount and/or Complexity of Data Reviewed  Labs: ordered. Decision-making details documented in ED Course.  Radiology: ordered.    Risk  OTC drugs.  Prescription drug management.  Decision regarding hospitalization.              Attending Attestation:     Physician Attestation Statement for NP/PA:   I personally made/approved the management plan and take responsibility for the patient management.    Other NP/PA  Attestation Additions:    History of Present Illness: Chest pain   Physical Exam: Alert and oriented x3,  Cardiovascular regular rate rhythm no murmurs rubs or gallops, respiratory clear to auscultation bilaterally   Medical Decision Making: Patient presenting with chest pain workup demonstrated elevated cardiac enzymes, EKG reviewed no STEMI.  Patient's risk factors include smoking, alcohol consumption plan to consult internal medicine for evaluation for non-STEMI.             ED Course as of 08/14/24 1232   Wed Aug 14, 2024   0905 Troponin I #1(!)  0.819 [RQ]   0906 Comprehensive metabolic panel(!)  Potassium 3.2  CO2 19  Glucose 114 [RQ]      ED Course User Index  [RQ] Leslie Dunn FNP                           Clinical Impression:  Final diagnoses:  [R07.9] Chest pain          ED Disposition Condition    Admit Stable                Leslie Dunn FNP  08/14/24 1231       Leslie Dunn FNP  08/14/24 1233

## 2024-08-14 NOTE — Clinical Note
The catheter was repositioned into the left subclavian artery. An angiography was performed of the LIMA. The angiography was performed via power injection.

## 2024-08-14 NOTE — H&P
[facility]ards History and Physical Note      Resident Team: [facility] Medicine List 2  Attending Physician: Annika Spicer MD  Resident: Jing Almendarez DO, Lauren Linn MD  Intern: Mary Kay Perez MD     Date of Admit: 8/14/2024  Chief Complaint   Chest Pain (Midline chest pain with radiating to back and down bilateral arms worse for the past 3 days. )       CARLA Edmonds is a 54 y.o. female with no significant past medical history, who presented to [facility] ED on 8/14/2024  with a primary complaint of chest pain. She states that she has had intermittent chest pain for the past couple of months. It previously resolved without intervention. As of 3 days ago, the chest pain has become constant and is described as tightness that radiates to both shoulders, down both arms, and to her mid back. It is mainly located midline and over her right chest, but spreads to both sides during activity. It is exacerbated by walking and activity. She reports drinking 1 oz of alcohol this morning which alleviated the chest pain slightly. She also reports a history of intermittent palpitations at rest that self resolve after a couple of minutes. Due to the chest pain being constant for the past three days, she presented for further evaluation.     In the ED, vitals upon arrival were T 97.8, /81, , RR 20, SpO2 97% on RA. Labs notable for K 3.2, bicarb 19, glucose 114. EtOH elevated. Troponin elevated at 0.819 and then 1.805 three hours later. BNP normal. PT/INR normal. EKG performed showed normal sinus rhythm with some T wave inversions in leads III and aVF. CXR performed showing no acute cardiopulomonary process. She was given NTG but states that it has not changed her pain much. She was also given  mL bolus. Medicine was consulted for further workup and management of chest pain.     History    PMH:  Past Medical History:   Diagnosis Date    Arthritis      Past Surgical History:   Past Surgical History:  "  Procedure Laterality Date    ANKLE HARDWARE REMOVAL Left 2024    Procedure: REMOVAL, HARDWARE, ANKLE;  Surgeon: Jared Ibanez MD;  Location: [facility];  Service: Orthopedics;  Laterality: Left;     SECTION      1    left ankle surgery      OPEN REDUCTION AND INTERNAL FIXATION (ORIF) OF PILON FRACTURE Left 2024    Procedure: ORIF, FRACTURE, PILON;  Surgeon: Jared Ibanez MD;  Location: [facility] OR;  Service: Orthopedics;  Laterality: Left;  Open "Broken" Schukla set/hold Screw Removal set    Right habd and wrist scope       Family History:   Family History   Problem Relation Name Age of Onset    Lung cancer Mother      Rheum arthritis Father      Diabetes Maternal Grandmother      Rheum arthritis Paternal Grandmother      Rheum arthritis Paternal Great-Grandmother       Social:   Social History     Tobacco Use    Smoking status: Every Day     Current packs/day: 1.00     Average packs/day: 0.5 packs/day for 35.3 years (18.5 ttl pk-yrs)     Types: Cigarettes     Start date: 1989    Smokeless tobacco: Never   Substance Use Topics    Alcohol use: Yes     Alcohol/week: 4.0 standard drinks of alcohol     Types: 3 Glasses of wine, 1 Shots of liquor per week     Comment: former daily alcohol drinker x 1 month, stopped in 2024    Drug use: Not Currently     Allergies:   Review of patient's allergies indicates:   Allergen Reactions    Meloxicam Swelling    Sulfa (sulfonamide antibiotics) Hives     On tongue    Sulfamethoxazole-trimethoprim Hives     On tongue     Home Medications   Prior to Admission medications    Medication Sig Start Date End Date Taking? Authorizing Provider   gabapentin (NEURONTIN) 300 MG capsule Take 1 capsule (300 mg total) by mouth 3 (three) times daily. 24 Yes Sherrill Zelaya MD   gabapentin (NEURONTIN) 300 MG capsule Take 1 capsule (300 mg total) by mouth 3 (three) times daily. 24 Yes Tevin Denton MD   aspirin 81 MG Chew Take 1 " "tablet (81 mg total) by mouth 2 (two) times a day. 24  Sherrill Zelaya MD   methocarbamoL (ROBAXIN) 750 MG Tab Take 750 mg by mouth 4 (four) times daily.    Provider, Eleonora   oxyCODONE (ROXICODONE) 5 MG immediate release tablet Take 1 tablet (5 mg total) by mouth every 8 (eight) hours as needed for Pain.  Patient not taking: Reported on 2024   Sherrill Zelaya MD       Review of Systems   Review of Systems   Constitutional:  Negative for chills, fever and malaise/fatigue.   Respiratory:  Negative for shortness of breath.    Cardiovascular:  Positive for chest pain and palpitations. Negative for leg swelling.   Gastrointestinal:  Negative for abdominal pain, constipation, diarrhea, nausea and vomiting.   Musculoskeletal:  Positive for back pain and joint pain.        Vital Signs    BP  Min: 114/81  Max: 114/81  Temp  Av.8 °F (36.6 °C)  Min: 97.8 °F (36.6 °C)  Max: 97.8 °F (36.6 °C)  Pulse  Av.5  Min: 87  Max: 104  Resp  Av  Min: 20  Max: 30  SpO2  Av %  Min: 97 %  Max: 97 %  Height  Av' 2" (157.5 cm)  Min: 5' 2" (157.5 cm)  Max: 5' 2" (157.5 cm)  Weight  Av.8 kg (120 lb 13 oz)  Min: 54.8 kg (120 lb 13 oz)  Max: 54.8 kg (120 lb 13 oz)  Body mass index is 22.1 kg/m².  No intake/output data recorded.    Physical Exam    Physical Exam  Constitutional:       General: She is not in acute distress.     Appearance: She is not ill-appearing.      Comments: Lying in bed, answers questions appropriately   HENT:      Mouth/Throat:      Mouth: Mucous membranes are moist.      Pharynx: Oropharynx is clear.   Cardiovascular:      Rate and Rhythm: Normal rate and regular rhythm.      Pulses: Normal pulses.      Heart sounds: S1 normal and S2 normal. No murmur heard.  Pulmonary:      Effort: Pulmonary effort is normal. No respiratory distress.      Breath sounds: Normal breath sounds. No wheezing, rhonchi or rales.   Chest:      Comments: Not able to reproduce pain upon " "palpation of anterior chest  Abdominal:      General: Bowel sounds are normal.      Palpations: Abdomen is soft.   Musculoskeletal:      Right lower leg: No edema.      Left lower leg: No edema.   Skin:     General: Skin is warm and dry.   Neurological:      Mental Status: She is alert and oriented to person, place, and time.         Labs    Most Recent Data:  CBC:   Lab Results   Component Value Date    WBC 9.79 08/14/2024    HGB 15.6 08/14/2024    HCT 43.7 08/14/2024     08/14/2024    MCV 94.8 (H) 08/14/2024    RDW 14.2 08/14/2024     WBC Differential:   Recent Labs   Lab 08/14/24 0825   WBC 9.79   HGB 15.6   HCT 43.7      MCV 94.8*     BMP:   Lab Results   Component Value Date     08/14/2024    K 3.2 (L) 08/14/2024     08/14/2024    CO2 19 (L) 08/14/2024    BUN 13.1 08/14/2024    CREATININE 0.57 08/14/2024    CALCIUM 9.3 08/14/2024    MG 1.90 08/14/2024     LFTs:   Lab Results   Component Value Date    ALBUMIN 3.7 08/14/2024    BILITOT 0.6 08/14/2024    AST 31 08/14/2024    ALKPHOS 121 08/14/2024    ALT 19 08/14/2024     Coags:   Lab Results   Component Value Date    INR 1.0 08/14/2024     FLP:   Lab Results   Component Value Date    CHOL 158 07/19/2024    HDL 56 07/19/2024    TRIG 95 07/19/2024     DM:   Lab Results   Component Value Date    HGBA1C 4.9 07/19/2024    CREATININE 0.57 08/14/2024     Thyroid:   Lab Results   Component Value Date    TSH 1.426 07/19/2024      Anemia: No results found for: "IRON", "TIBC", "FERRITIN", "SATURATEDIRO"  No results found for: "[id number]"  No results found for: "FOLATE"     Cardiac:   Lab Results   Component Value Date    TROPONINI 2.350 (H) 08/14/2024    BNP 84.5 08/14/2024     Urinalysis:   Lab Results   Component Value Date    COLORU Yellow 07/19/2024    NITRITE Negative 07/19/2024    UROBILINOGEN Normal 07/19/2024    WBCUA 0-5 07/19/2024       Trended Lab Data:  Recent Labs   Lab 08/14/24  0825   WBC 9.79   HGB 15.6   HCT 43.7      MCV " 94.8*   RDW 14.2      K 3.2*      CO2 19*   BUN 13.1   CREATININE 0.57   ALBUMIN 3.7   BILITOT 0.6   AST 31   ALKPHOS 121   ALT 19       Trended Cardiac Data:  Recent Labs   Lab 08/14/24  0825 08/14/24  1104 08/14/24  1355   TROPONINI 0.819* 1.805* 2.350*   BNP 84.5  --   --        EKG (my interpretation): NSR, rate 92 bpm. T wave inversions in leads III and aVF    Imaging      Imaging Results              X-Ray Chest PA And Lateral (Final result)  Result time 08/14/24 08:33:23      Final result by Dioni Dewitt MD (08/14/24 08:33:23)                   Impression:      No acute cardiopulmonary process identified.      Electronically signed by: Dioni Dewitt  Date:    08/14/2024  Time:    08:33               Narrative:    EXAMINATION:  XR CHEST PA AND LATERAL    CLINICAL HISTORY:  Chest Pain;    COMPARISON:  July 24, 2024.    FINDINGS:  Cardiopericardial silhouette is within normal limits.  No acute dense focal or segmental consolidation, congestion, pleural effusion or pneumothorax.  There is pectus excavatum deformity.                                       Assessment and Plan     Chest pain  Elevated troponin  Intermittent palpitations   - BP on arrival 114/81, has remained stable. Not on antihypertensive medications at home   - EKG NSR with T wave inversions in leads III and aVF   - Troponins 0.819 --> 1.805 --> 2.350. Trend to peak   - UDS pending   - Repeat EKG pending. Trend EKGs q4h   - TTE pending   - Loading dose ASA and Plavix ordered   - Continue ASA 81 BID starting tomorrow   - Plavix daily starting tomorrow    Hypokalemia   - K 3.2 on admission. Continue to monitor. Replete as needed    Anion gap metabolic acidosis   - Bicarb 19, anion gap 14   - Likely due to EtOH consumption this morning    Neuropathic pain after ankle surgery   - Continue home gabapentin 300 TID          CODE STATUS: Full  Access: peripheral IV  Antibiotics: None  Diet: NPO  DVT Prophylaxis: Lovenox  GI Prophylaxis:  None  Fluids: None      Disposition: Admitted for workup of chest pain with elevated troponin.       Mary Kay Perez MD  [facility]

## 2024-08-15 LAB
ALBUMIN SERPL-MCNC: 2.9 G/DL (ref 3.5–5)
ALBUMIN/GLOB SERPL: 1.1 RATIO (ref 1.1–2)
ALP SERPL-CCNC: 98 UNIT/L (ref 40–150)
ALT SERPL-CCNC: 14 UNIT/L (ref 0–55)
AMPHET UR QL SCN: NEGATIVE
ANION GAP SERPL CALC-SCNC: 9 MEQ/L
APTT PPP: 143.1 SECONDS (ref 23.2–33.7)
APTT PPP: 41.3 SECONDS (ref 23.2–33.7)
APTT PPP: 51.3 SECONDS (ref 23.2–33.7)
APTT PPP: 52.8 SECONDS (ref 23.2–33.7)
AST SERPL-CCNC: 28 UNIT/L (ref 5–34)
BARBITURATE SCN PRESENT UR: NEGATIVE
BASOPHILS # BLD AUTO: 0.05 X10(3)/MCL
BASOPHILS NFR BLD AUTO: 0.5 %
BENZODIAZ UR QL SCN: NEGATIVE
BILIRUB SERPL-MCNC: 0.8 MG/DL
BUN SERPL-MCNC: 8.8 MG/DL (ref 9.8–20.1)
CALCIUM SERPL-MCNC: 8.8 MG/DL (ref 8.4–10.2)
CANNABINOIDS UR QL SCN: POSITIVE
CHLORIDE SERPL-SCNC: 108 MMOL/L (ref 98–107)
CO2 SERPL-SCNC: 23 MMOL/L (ref 22–29)
COCAINE UR QL SCN: NEGATIVE
CREAT SERPL-MCNC: 0.6 MG/DL (ref 0.55–1.02)
CREAT/UREA NIT SERPL: 15
EOSINOPHIL # BLD AUTO: 0.22 X10(3)/MCL (ref 0–0.9)
EOSINOPHIL NFR BLD AUTO: 2.4 %
ERYTHROCYTE [DISTWIDTH] IN BLOOD BY AUTOMATED COUNT: 14.4 % (ref 11.5–17)
FENTANYL UR QL SCN: NEGATIVE
GFR SERPLBLD CREATININE-BSD FMLA CKD-EPI: >60 ML/MIN/1.73/M2
GLOBULIN SER-MCNC: 2.6 GM/DL (ref 2.4–3.5)
GLUCOSE SERPL-MCNC: 86 MG/DL (ref 74–100)
HCT VFR BLD AUTO: 39.3 % (ref 37–47)
HGB BLD-MCNC: 13.5 G/DL (ref 12–16)
HOLD SPECIMEN: NORMAL
IMM GRANULOCYTES # BLD AUTO: 0.02 X10(3)/MCL (ref 0–0.04)
IMM GRANULOCYTES NFR BLD AUTO: 0.2 %
LYMPHOCYTES # BLD AUTO: 4.35 X10(3)/MCL (ref 0.6–4.6)
LYMPHOCYTES NFR BLD AUTO: 47.5 %
MAGNESIUM SERPL-MCNC: 2 MG/DL (ref 1.6–2.6)
MCH RBC QN AUTO: 33 PG (ref 27–31)
MCHC RBC AUTO-ENTMCNC: 34.4 G/DL (ref 33–36)
MCV RBC AUTO: 96.1 FL (ref 80–94)
MDMA UR QL SCN: NEGATIVE
MONOCYTES # BLD AUTO: 0.6 X10(3)/MCL (ref 0.1–1.3)
MONOCYTES NFR BLD AUTO: 6.6 %
NEUTROPHILS # BLD AUTO: 3.91 X10(3)/MCL (ref 2.1–9.2)
NEUTROPHILS NFR BLD AUTO: 42.8 %
NRBC BLD AUTO-RTO: 0 %
OHS QRS DURATION: 72 MS
OHS QRS DURATION: 76 MS
OHS QRS DURATION: 78 MS
OHS QTC CALCULATION: 449 MS
OHS QTC CALCULATION: 465 MS
OHS QTC CALCULATION: 489 MS
OPIATES UR QL SCN: NEGATIVE
PCP UR QL: NEGATIVE
PH UR: 6.5 [PH] (ref 3–11)
PHOSPHATE SERPL-MCNC: 2.9 MG/DL (ref 2.3–4.7)
PLATELET # BLD AUTO: 293 X10(3)/MCL (ref 130–400)
PMV BLD AUTO: 9.7 FL (ref 7.4–10.4)
POCT GLUCOSE: 129 MG/DL (ref 70–110)
POTASSIUM SERPL-SCNC: 3.8 MMOL/L (ref 3.5–5.1)
PROT SERPL-MCNC: 5.5 GM/DL (ref 6.4–8.3)
RBC # BLD AUTO: 4.09 X10(6)/MCL (ref 4.2–5.4)
SODIUM SERPL-SCNC: 140 MMOL/L (ref 136–145)
SPECIFIC GRAVITY, URINE AUTO (.000) (OHS): 1.01 (ref 1–1.03)
TROPONIN I SERPL-MCNC: 1.24 NG/ML (ref 0–0.04)
TROPONIN I SERPL-MCNC: 1.61 NG/ML (ref 0–0.04)
TROPONIN I SERPL-MCNC: 2.04 NG/ML (ref 0–0.04)
WBC # BLD AUTO: 9.15 X10(3)/MCL (ref 4.5–11.5)

## 2024-08-15 PROCEDURE — 25000242 PHARM REV CODE 250 ALT 637 W/ HCPCS

## 2024-08-15 PROCEDURE — 21400001 HC TELEMETRY ROOM

## 2024-08-15 PROCEDURE — 63600175 PHARM REV CODE 636 W HCPCS

## 2024-08-15 PROCEDURE — 94761 N-INVAS EAR/PLS OXIMETRY MLT: CPT

## 2024-08-15 PROCEDURE — 84100 ASSAY OF PHOSPHORUS: CPT

## 2024-08-15 PROCEDURE — 85730 THROMBOPLASTIN TIME PARTIAL: CPT | Performed by: INTERNAL MEDICINE

## 2024-08-15 PROCEDURE — 36415 COLL VENOUS BLD VENIPUNCTURE: CPT

## 2024-08-15 PROCEDURE — 93005 ELECTROCARDIOGRAM TRACING: CPT

## 2024-08-15 PROCEDURE — 94640 AIRWAY INHALATION TREATMENT: CPT

## 2024-08-15 PROCEDURE — 36415 COLL VENOUS BLD VENIPUNCTURE: CPT | Performed by: INTERNAL MEDICINE

## 2024-08-15 PROCEDURE — 25000003 PHARM REV CODE 250

## 2024-08-15 PROCEDURE — 85025 COMPLETE CBC W/AUTO DIFF WBC: CPT

## 2024-08-15 PROCEDURE — 84484 ASSAY OF TROPONIN QUANT: CPT

## 2024-08-15 PROCEDURE — 27000221 HC OXYGEN, UP TO 24 HOURS

## 2024-08-15 PROCEDURE — 80053 COMPREHEN METABOLIC PANEL: CPT

## 2024-08-15 PROCEDURE — 80307 DRUG TEST PRSMV CHEM ANLYZR: CPT

## 2024-08-15 PROCEDURE — 83735 ASSAY OF MAGNESIUM: CPT

## 2024-08-15 RX ORDER — HEPARIN SODIUM,PORCINE/D5W 25000/250
0-40 INTRAVENOUS SOLUTION INTRAVENOUS CONTINUOUS
Status: DISCONTINUED | OUTPATIENT
Start: 2024-08-15 | End: 2024-08-16 | Stop reason: HOSPADM

## 2024-08-15 RX ORDER — IPRATROPIUM BROMIDE AND ALBUTEROL SULFATE 2.5; .5 MG/3ML; MG/3ML
3 SOLUTION RESPIRATORY (INHALATION) EVERY 4 HOURS PRN
Status: DISCONTINUED | OUTPATIENT
Start: 2024-08-15 | End: 2024-08-16 | Stop reason: HOSPADM

## 2024-08-15 RX ORDER — SODIUM CHLORIDE 9 MG/ML
INJECTION, SOLUTION INTRAVENOUS ONCE
OUTPATIENT
Start: 2024-08-15 | End: 2024-08-15

## 2024-08-15 RX ORDER — SODIUM CHLORIDE 0.9 % (FLUSH) 0.9 %
10 SYRINGE (ML) INJECTION
Status: DISCONTINUED | OUTPATIENT
Start: 2024-08-15 | End: 2024-08-16 | Stop reason: HOSPADM

## 2024-08-15 RX ADMIN — HEPARIN SODIUM 14 UNITS/KG/HR: 10000 INJECTION, SOLUTION INTRAVENOUS at 08:08

## 2024-08-15 RX ADMIN — IPRATROPIUM BROMIDE AND ALBUTEROL SULFATE 3 ML: .5; 3 SOLUTION RESPIRATORY (INHALATION) at 03:08

## 2024-08-15 RX ADMIN — HEPARIN SODIUM 12 UNITS/KG/HR: 10000 INJECTION, SOLUTION INTRAVENOUS at 11:08

## 2024-08-15 RX ADMIN — GABAPENTIN 300 MG: 300 CAPSULE ORAL at 02:08

## 2024-08-15 RX ADMIN — HEPARIN SODIUM 14 UNITS/KG/HR: 10000 INJECTION, SOLUTION INTRAVENOUS at 05:08

## 2024-08-15 RX ADMIN — NITROGLYCERIN 0.4 MG: 0.4 TABLET SUBLINGUAL at 12:08

## 2024-08-15 RX ADMIN — IPRATROPIUM BROMIDE AND ALBUTEROL SULFATE 3 ML: .5; 3 SOLUTION RESPIRATORY (INHALATION) at 07:08

## 2024-08-15 RX ADMIN — GABAPENTIN 300 MG: 300 CAPSULE ORAL at 09:08

## 2024-08-15 RX ADMIN — SODIUM CHLORIDE, POTASSIUM CHLORIDE, SODIUM LACTATE AND CALCIUM CHLORIDE: 600; 310; 30; 20 INJECTION, SOLUTION INTRAVENOUS at 09:08

## 2024-08-15 RX ADMIN — NITROGLYCERIN 0.4 MG: 0.4 TABLET SUBLINGUAL at 07:08

## 2024-08-15 RX ADMIN — ASPIRIN 81 MG 81 MG: 81 TABLET ORAL at 09:08

## 2024-08-15 RX ADMIN — HEPARIN SODIUM 12 UNITS/KG/HR: 10000 INJECTION, SOLUTION INTRAVENOUS at 01:08

## 2024-08-15 RX ADMIN — SODIUM CHLORIDE, POTASSIUM CHLORIDE, SODIUM LACTATE AND CALCIUM CHLORIDE: 600; 310; 30; 20 INJECTION, SOLUTION INTRAVENOUS at 06:08

## 2024-08-15 RX ADMIN — GABAPENTIN 300 MG: 300 CAPSULE ORAL at 08:08

## 2024-08-15 NOTE — PLAN OF CARE
08/15/24 1043   Discharge Assessment   Assessment Type Discharge Planning Assessment   Confirmed/corrected address, phone number and insurance Yes   Confirmed Demographics Correct on Facesheet   When was your last doctors appointment?   (Emma Rea)   Reason For Admission Chest pain   People in Home alone   Facility Arrived From: Home   Do you expect to return to your current living situation? No   Do you have help at home or someone to help you manage your care at home? Yes   Who are your caregiver(s) and their phone number(s)? Lety Altamirano  Sister  [phone]    2  Emy Huber  Sister  [phone]   Prior to hospitilization cognitive status: Alert/Oriented   Current cognitive status: Alert/Oriented   Walking or Climbing Stairs Difficulty no   Dressing/Bathing Difficulty no   Equipment Currently Used at Home none  (Available in home, not used: Cam Boot, Rollator, Cane, Crutches)   Readmission within 30 days? No   Patient currently being followed by outpatient case management? No   Do you currently have service(s) that help you manage your care at home? No   Do you take prescription medications? Yes  ([facility])   Do you have prescription coverage? Yes  ([facility])   Coverage [facility] M/D   Do you have any problems affording any of your prescribed medications? No   Is the patient taking medications as prescribed? yes   Who is going to help you get home at discharge? Family   How do you get to doctors appointments? car, drives self   Are you on dialysis? No   Discharge Plan A Home   DME Needed Upon Discharge  none   Discharge Plan discussed with: Patient   Transition of Care Barriers None   Physical Activity   On average, how many days per week do you engage in moderate to strenuous exercise (like a brisk walk)? 0 days   On average, how many minutes do you engage in exercise at this level? 0 min   Financial Resource Strain   How hard is it for you to pay for the very basics like  food, housing, medical care, and heating? Not very   Transportation Needs   Has the lack of transportation kept you from medical appointments, meetings, work or from getting things needed for daily living? No   Food Insecurity   Within the past 12 months, you worried that your food would run out before you got the money to buy more. Never true   Within the past 12 months, the food you bought just didn't last and you didn't have money to get more. Never true   Stress   Do you feel stress - tense, restless, nervous, or anxious, or unable to sleep at night because your mind is troubled all the time - these days? Not at all   Social Isolation   How often do you feel lonely or isolated from those around you?  Never   Health Literacy   How often do you need to have someone help you when you read instructions, pamphlets, or other written material from your doctor or pharmacy? Never     Pt single with 2 children; Resides alone; Currently not working; Financial support from Savings, Friends & Family; Pt plans to move in with friend upon discharge; Independent with ADL's; CM to follow.

## 2024-08-15 NOTE — CONSULTS
"Cardiology Consult Note     Cardiology Attending: Dr. Cassidy  Cardiology Fellow: Zana Newell MD     Date of Admit: 2024  Date of Consult: 8/15/2024    Reason for Consultation:     "Chest pain"    History of Present Illness:      Gilma Edmonds is a 54 y.o. female with a no PMHx who presented to [facility] on  for acute on chronic episodic chest pain. Patient states her chest pain episodes started several months ago. They are described as a substernal pressure/tightness that would occur with exertion (such as walking/hiking). The episodes initially only lasted a few minutes and would go away with rest. Over the next few months leadingup to this presentation the chest pain episodes became more frequent, would last longer, and would come to be associated with some SoB and palpitations. Over the prior week leading up to her presentation the chest pain episodes would start to occur even at rest and the pain was pretty much constant, thus her presentation to [facility]. The chest pain would radiate to her back, both shoulders, and both arms. She denies any lower extremity edema, syncope episodes, dizziness, lightheadedness, nausea, vomiting.     Past Medical History:     Past Medical History:   Diagnosis Date    Arthritis      Surgical History:     Past Surgical History:   Procedure Laterality Date    ANKLE HARDWARE REMOVAL Left 2024    Procedure: REMOVAL, HARDWARE, ANKLE;  Surgeon: Jared Ibanez MD;  Location: [facility];  Service: Orthopedics;  Laterality: Left;     SECTION      1    left ankle surgery      OPEN REDUCTION AND INTERNAL FIXATION (ORIF) OF PILON FRACTURE Left 2024    Procedure: ORIF, FRACTURE, PILON;  Surgeon: Jared Ibanez MD;  Location: [facility];  Service: Orthopedics;  Laterality: Left;  Open "Broken" Schukla set/hold Screw Removal set    Right habd and wrist scope       Allergies:     Review of patient's allergies indicates:   Allergen Reactions    Meloxicam Swelling "    Sulfa (sulfonamide antibiotics) Hives     On tongue    Sulfamethoxazole-trimethoprim Hives     On tongue     Family History:     Family History   Problem Relation Name Age of Onset    Lung cancer Mother      Rheum arthritis Father      Diabetes Maternal Grandmother      Rheum arthritis Paternal Grandmother      Rheum arthritis Paternal Great-Grandmother       Social History:     Social History     Tobacco Use    Smoking status: Every Day     Current packs/day: 1.00     Average packs/day: 0.5 packs/day for 35.3 years (18.5 ttl pk-yrs)     Types: Cigarettes     Start date: 4/24/1989    Smokeless tobacco: Never   Substance Use Topics    Alcohol use: Yes     Alcohol/week: 4.0 standard drinks of alcohol     Types: 3 Glasses of wine, 1 Shots of liquor per week     Comment: former daily alcohol drinker x 1 month, stopped in april 2024    Drug use: Not Currently     Review of Systems:     14 point ROS negative except for what is noted in the HPI.  Medications:     Home Medications:  Prior to Admission medications    Medication Sig Start Date End Date Taking? Authorizing Provider   gabapentin (NEURONTIN) 300 MG capsule Take 1 capsule (300 mg total) by mouth 3 (three) times daily. 4/30/24 4/30/25 Yes Sherrill Zelaya MD   gabapentin (NEURONTIN) 300 MG capsule Take 1 capsule (300 mg total) by mouth 3 (three) times daily. 7/31/24 7/31/25 Yes Tevin Denton MD   aspirin 81 MG Chew Take 1 tablet (81 mg total) by mouth 2 (two) times a day. 4/30/24 7/31/24  Sherrill Zelaya MD   methocarbamoL (ROBAXIN) 750 MG Tab Take 750 mg by mouth 4 (four) times daily.    Provider, Eleonora   oxyCODONE (ROXICODONE) 5 MG immediate release tablet Take 1 tablet (5 mg total) by mouth every 8 (eight) hours as needed for Pain.  Patient not taking: Reported on 7/31/2024 5/23/24   Sherrill Zelaya MD       Current/Inpatient Medications:  Infusions:   heparin (porcine) in D5W  0-40 Units/kg/hr (Adjusted) Intravenous Continuous 6.2 mL/hr at  "08/15/24 1104 12 Units/kg/hr at 08/15/24 1104    lactated ringers   Intravenous Continuous 125 mL/hr at 08/15/24 0957 New Bag at 08/15/24 0957     Scheduled:   aspirin  81 mg Oral Daily    gabapentin  300 mg Oral TID     PRN:    Current Facility-Administered Medications:     acetaminophen, 650 mg, Oral, Q4H PRN    albuterol-ipratropium, 3 mL, Nebulization, Q4H PRN    dextrose 10%, 12.5 g, Intravenous, PRN    dextrose 10%, 25 g, Intravenous, PRN    glucagon (human recombinant), 1 mg, Intramuscular, PRN    glucose, 16 g, Oral, PRN    glucose, 24 g, Oral, PRN    heparin (PORCINE), 60 Units/kg (Adjusted), Intravenous, PRN    heparin (PORCINE), 30 Units/kg (Adjusted), Intravenous, PRN    hydrALAZINE, 10 mg, Intravenous, Q4H PRN    labetalol, 10 mg, Intravenous, Q4H PRN    melatonin, 6 mg, Oral, Nightly PRN    naloxone, 0.02 mg, Intravenous, PRN    nitroGLYCERIN, 0.4 mg, Sublingual, Q5 Min PRN    sodium chloride 0.9%, 10 mL, Intravenous, Q12H PRN    Objective:     24-hour Vitals:   Temp:  [97.6 °F (36.4 °C)-98.4 °F (36.9 °C)] 97.6 °F (36.4 °C)  Pulse:  [58-77] 62  Resp:  [13-23] 13  SpO2:  [97 %-100 %] 97 %  BP: ()/(60-88) 120/76    Vitals: /76   Pulse 62   Temp 97.6 °F (36.4 °C) (Oral)   Resp 13   Ht 5' 2" (1.575 m)   Wt 54.4 kg (120 lb)   SpO2 97%   Breastfeeding No   BMI 21.95 kg/m²     Intake/Output Summary (Last 24 hours) at 8/15/2024 1516  Last data filed at 8/15/2024 0656  Gross per 24 hour   Intake 1735.33 ml   Output 200 ml   Net 1535.33 ml       Physical Exam:  General: Alert and awake in NAD  HENT: NCAT; anicteric sclera; OP clear with MMM  Cardio: Regular rate and rhythm with normal S1 and S2; no murmurs or rubs  Resp: CTAB; respirations unlabored; no wheezes, crackles or rhonchi  Abdom: Soft, non-distended  Extrem: WWP with no clubbing, cyanosis or edema  Pulses:2+ and symmetric distally  Neuro: AAOx3; cooperative and pleasant with no focal deficits    Labs:   I have reviewed the following " labs below:    Recent Labs   Lab 08/14/24  0825 08/14/24  0932 08/14/24  1104 08/14/24  2012 08/15/24  0219 08/15/24  0809   WBC 9.79  --   --  9.78 9.15  --    HGB 15.6  --   --  14.1 13.5  --    HCT 43.7  --   --  40.7 39.3  --      --   --  293 293  --      --   --   --  140  --    K 3.2*  --   --   --  3.8  --      --   --   --  108*  --    CO2 19*  --   --   --  23  --    BUN 13.1  --   --   --  8.8*  --    CREATININE 0.57  --   --   --  0.60  --    CALCIUM 9.3  --   --   --  8.8  --    MG 1.90  --   --   --  2.00  --    PHOS  --   --   --   --  2.9  --    ALBUMIN 3.7  --   --   --  2.9*  --    BILITOT 0.6  --   --   --  0.8  --    AST 31  --   --   --  28  --    ALT 19  --   --   --  14  --    ALKPHOS 121  --   --   --  98  --    INR  --  1.0  --  1.0  --   --    TROPONINI 0.819*  --    < > 2.690* 2.043* 1.615*   BNP 84.5  --   --   --   --   --     < > = values in this interval not displayed.     Cardiovascular Studies/Imaging:   I have reviewed the following studies below:    Echocardiogram  Results for orders placed during the hospital encounter of 08/14/24    Echo    Interpretation Summary    Left Ventricle: The left ventricle is normal in size. Normal wall thickness. There is normal systolic function. Biplane (2D) method of discs ejection fraction is 63%.    Right Ventricle: Mild right ventricular enlargement. Systolic function is normal.    Mitral Valve: There is mild to moderate regurgitation.    Pulmonary Artery: The estimated pulmonary artery systolic pressure is 27 mmHg.    IVC/SVC: Intermediate venous pressure at 8 mmHg.      Stress Test  No results found for this or any previous visit.       Coronary Angiogram  No results found for this or any previous visit.           Assessment:     Gilma Edmonds is a 53 y/o F w/ no reported PMHx that presents for acute on chronic worsening substernal chest pain/pressure w/ associated SoB & palpitations over the past few months. Initial workup  with rising troponins that have since peaked at 2.6 and are now down trending. No notable EKG findings of ST elevations/depressions/TWI. Cardiology consulted for further evaluation    Plan/Recommendations:     NSTEMI  - symptoms & initial workup concerning for NSTEMI. Troponins peaked at 2.6 now down trending. EKG without ST elevations/depressions or TWI.  - TTE obtained: normal LVEF, no obvious wall motion abnormalities or dilated cardiomyopathy.  - Plan for coronary angiogram Friday afternoon. Patient has been consented. NPO at midnight  - Patient has been ASA & plavix loaded on 14Aug24. Continue ASA 81mg qdaily.  - Will start daily plavix after angiogram.  - Continue heparin gtt until after angiogram.  - prn sublingual nitroglycerin for worsening sx.  - continuous cardiac monitoring  - maintain K >4, Mg >2    Zana Newell  [facility] Cardiology Fellow  08/15/2024 3:16 PM

## 2024-08-15 NOTE — PROGRESS NOTES
[facility]   Progress Note      Resident Team: [facility] List 2  Attending Physician: Annika Spicer MD  Resident: Jing Almendarez DO, Susana Rac, MD  Intern: Mary Kay Perez MD   Hospital Length of Stay: 1 days    Subjective   Brief HPI:  Gilma Edmonds is a 54 y.o. female with no significant past medical history, who presented to [facility] ED on 8/14/2024  with a primary complaint of chest pain. She states that she has had intermittent chest pain for the past couple of months. It previously resolved without intervention. As of 3 days ago, the chest pain has become constant and is described as tightness that radiates to both shoulders, down both arms, and to her mid back. It is mainly located midline and over her right chest, but spreads to both sides during activity. It is exacerbated by walking and activity. She reports drinking 1 oz of alcohol this morning which alleviated the chest pain slightly. She also reports a history of intermittent palpitations at rest that self resolve after a couple of minutes. Due to the chest pain being constant for the past three days, she presented for further evaluation.      In the ED, vitals upon arrival were T 97.8, /81, , RR 20, SpO2 97% on RA. Labs notable for K 3.2, bicarb 19, glucose 114. EtOH elevated. Troponin elevated at 0.819 and then 1.805 three hours later. BNP normal. PT/INR normal. EKG performed showed normal sinus rhythm with some T wave inversions in leads III and aVF. CXR performed showing no acute cardiopulomonary process. She was given NTG but states that it has not changed her pain much. She was also given  mL bolus. Medicine was consulted for further workup and management of chest pain.     Interval History: Overnight, patient had persistent chest pain and up-trending troponin and was started on a heparin drip. EKG overnight showed sinus bradycardia and no ST changes. This morning, she was resting in bed. Endorsed some mild  chest pain described as burning and tightness located in the midline. She states that it continues to get worse when she get up and moves around.     Review of Systems:  Review of Systems   Constitutional:  Negative for chills and fever.   Respiratory:  Negative for cough and sputum production.    Cardiovascular:  Positive for chest pain. Negative for palpitations and leg swelling.   Gastrointestinal:  Negative for abdominal pain, constipation, diarrhea, heartburn, nausea and vomiting.   Genitourinary:  Negative for dysuria and frequency.   Neurological:  Negative for dizziness and headaches.        Objective     Vital Signs (Most Recent):  Temp: 98.3 °F (36.8 °C) (08/15/24 0351)  Pulse: 63 (08/15/24 0351)  Resp: 18 (08/15/24 0351)  BP: 111/73 (08/15/24 0351)  SpO2: 98 % (08/15/24 0718) Vital Signs (24h Range):  Temp:  [98.3 °F (36.8 °C)-98.4 °F (36.9 °C)] 98.3 °F (36.8 °C)  Pulse:  [57-87] 63  Resp:  [13-30] 18  SpO2:  [96 %-100 %] 98 %  BP: ()/(60-88) 111/73     Physical Examination:  Physical Exam  Vitals reviewed.   Constitutional:       General: She is not in acute distress.     Appearance: She is not diaphoretic.   HENT:      Mouth/Throat:      Pharynx: Oropharynx is clear.   Cardiovascular:      Rate and Rhythm: Normal rate and regular rhythm.      Pulses: Normal pulses.      Heart sounds: Normal heart sounds. No murmur heard.  Pulmonary:      Effort: Pulmonary effort is normal. No respiratory distress.      Breath sounds: Normal breath sounds. No wheezing, rhonchi or rales.   Abdominal:      General: Bowel sounds are normal. There is no distension.      Palpations: Abdomen is soft.      Tenderness: There is no abdominal tenderness.   Musculoskeletal:      Right lower leg: No edema.      Left lower leg: No edema.   Skin:     General: Skin is warm and dry.   Neurological:      Mental Status: She is alert and oriented to person, place, and time.   Psychiatric:         Behavior: Behavior is cooperative.          Laboratory:  Most Recent Data:  Recent Lab Results  (Last 5 results in the past 24 hours)        08/15/24  1025   08/15/24  1015   08/15/24  0812   08/15/24  0809   08/15/24  0356        Phencyclidine         Negative       Amphetamines, Urine         Negative       PTT   41.3  Comment: For Minimal Heparin Infusion, the goal aPTT 64-85 seconds corresponds to an anti-Xa of 0.3-0.5.    For Low Intensity and High Intensity Heparin, the goal aPTT  seconds corresponds to an anti-Xa of 0.3-0.7             Barbituates, Urine         Negative       Benzodiazepine, Urine         Negative       Cannabinoids, Urine         Positive       Cocaine, Urine         Negative       Fentanyl, Urine         Negative       Extra Tube Hold for add-ons.  Comment: Auto resulted.                   Hold for add-ons.  Comment: Auto resulted.                   Hold for add-ons.  Comment: Auto resulted.                   Hold for add-ons.  Comment: Auto resulted.                  MDMA, Urine         Negative       QRS Duration     72           OHS QTC Calculation     449           Opiates, Urine         Negative       pH, Urine         6.5       Specific Gravity, Urine Auto         1.008       Troponin I       1.615                                Other Results:  EKG (my interpretation): sinus bradycardia.    Radiology:  Imaging Results              X-Ray Chest PA And Lateral (Final result)  Result time 08/14/24 08:33:23      Final result by Dioni Dewitt MD (08/14/24 08:33:23)                   Impression:      No acute cardiopulmonary process identified.      Electronically signed by: Dioni Dewitt  Date:    08/14/2024  Time:    08:33               Narrative:    EXAMINATION:  XR CHEST PA AND LATERAL    CLINICAL HISTORY:  Chest Pain;    COMPARISON:  July 24, 2024.    FINDINGS:  Cardiopericardial silhouette is within normal limits.  No acute dense focal or segmental consolidation, congestion, pleural effusion or pneumothorax.   There is pectus excavatum deformity.                                      Current Medications:     Infusions:   heparin (porcine) in D5W  0-40 Units/kg/hr (Adjusted) Intravenous Continuous 4.7 mL/hr at 08/15/24 0656 9 Units/kg/hr at 08/15/24 0656    lactated ringers   Intravenous Continuous 125 mL/hr at 08/15/24 0656 Rate Verify at 08/15/24 0656        Scheduled:   aspirin  81 mg Oral Daily    gabapentin  300 mg Oral TID        PRN:    Current Facility-Administered Medications:     acetaminophen, 650 mg, Oral, Q4H PRN    dextrose 10%, 12.5 g, Intravenous, PRN    dextrose 10%, 25 g, Intravenous, PRN    glucagon (human recombinant), 1 mg, Intramuscular, PRN    glucose, 16 g, Oral, PRN    glucose, 24 g, Oral, PRN    heparin (PORCINE), 60 Units/kg (Adjusted), Intravenous, PRN    heparin (PORCINE), 30 Units/kg (Adjusted), Intravenous, PRN    hydrALAZINE, 10 mg, Intravenous, Q4H PRN    labetalol, 10 mg, Intravenous, Q4H PRN    melatonin, 6 mg, Oral, Nightly PRN    naloxone, 0.02 mg, Intravenous, PRN    nitroGLYCERIN, 0.4 mg, Sublingual, Q5 Min PRN    sodium chloride 0.9%, 10 mL, Intravenous, Q12H PRN      Intake/Output Summary (Last 24 hours) at 8/15/2024 0758  Last data filed at 8/15/2024 0656  Gross per 24 hour   Intake 2235.33 ml   Output 200 ml   Net 2035.33 ml       Lines/Drains/Airways       Peripheral Intravenous Line  Duration                  Peripheral IV - Single Lumen 08/14/24 0824 20 G Anterior;Right Forearm <1 day                    Assessment and Plan    Chest pain  Elevated troponin  Intermittent palpitations   - BP on arrival 114/81, has remained stable. Not on antihypertensive medications at home   - EKG NSR 8/14, sinus elvis 8/15   - Troponins 0.819, trended to peak of 2.69. Has now decreased to 1.615    - UDS +cannabinoids   - TTE 8/14/24 EF 63%, mild right ventricular enlargement, mild-moderate mitral regurgitation, PAS pressure 27 mmHg   - Loading dose ASA and Plavix given 8/14/24   - Continue ASA 81  mg daily   - On heparin drip, Plavix held   - Cardiology consulted. NPO at midnight, plan for LHC tomorrow     Hypokalemia - resolved   - K 3.2 on admission. Continue to monitor. Replete as needed     Neuropathic pain after ankle surgery   - Continue home gabapentin 300 TID    Anion gap metabolic acidosis - resolved   - Bicarb 19, anion gap 14   - Likely due to EtOH consumption        CODE STATUS: Full  Access: peripheral IV  Antibiotics: None  Diet: Heart healthy, NPO at midnight  DVT Prophylaxis: Heparin  GI Prophylaxis: None  Fluids: LR @ 125 mL/hr      Disposition: Admitted for workup of chest pain with elevated troponins. Undergoing LHC tomorrow.       Mary Kay Perez MD  [facility]

## 2024-08-15 NOTE — PROGRESS NOTES
Inpatient Nutrition Assessment    Admit Date: 8/14/2024   Total duration of encounter: 1 day   Patient Age: 54 y.o.    Nutrition Recommendation/Prescription     Post procedure--ADAT to heart healthy diet  Pt education on diet/complete  MVI/fe  Biweekly wt  Will monitor nutrition status     Communication of Recommendations: reviewed with nurse and reviewed with patient    Nutrition Assessment     Malnutrition Assessment/Nutrition-Focused Physical Exam    Malnutrition Context: acute illness or injury (08/15/24 1354)  Malnutrition Level: other (see comments) (pt does not meet malnutrition criteria) (08/15/24 1354)  Energy Intake (Malnutrition): other (see comments) (does not meet criteria) (08/15/24 1354)  Weight Loss (Malnutrition):  (does  not meet criteria) (08/15/24 1354)  Subcutaneous Fat (Malnutrition): other (see comments) (does not meet criteria) (08/15/24 1354)  Orbital Region (Subcutaneous Fat Loss): well nourished  Upper Arm Region (Subcutaneous Fat Loss): well nourished     Muscle Mass (Malnutrition): other (see comments) (does not meet criteria) (08/15/24 1354)  Christianity Region (Muscle Loss): well nourished  Clavicle Bone Region (Muscle Loss): well nourished                             A minimum of two characteristics is recommended for diagnosis of either severe or non-severe malnutrition.    Chart Review    Reason Seen: length of stay    Malnutrition Screening Tool Results   Have you recently lost weight without trying?: No  Have you been eating poorly because of a decreased appetite?: No   MST Score: 0   Diagnosis:  CP, elevated troponin, palpitation, hypokalemia, metabolic acidosis     Relevant Medical History: none    Scheduled Medications:  aspirin, 81 mg, Daily  gabapentin, 300 mg, TID    Continuous Infusions:  heparin (porcine) in D5W, Last Rate: 12 Units/kg/hr (08/15/24 1104)  lactated ringers, Last Rate: 125 mL/hr at 08/15/24 0957    PRN Medications:  acetaminophen, 650 mg, Q4H PRN  dextrose 10%,  "12.5 g, PRN  dextrose 10%, 25 g, PRN  glucagon (human recombinant), 1 mg, PRN  glucose, 16 g, PRN  glucose, 24 g, PRN  heparin (PORCINE), 60 Units/kg (Adjusted), PRN  heparin (PORCINE), 30 Units/kg (Adjusted), PRN  hydrALAZINE, 10 mg, Q4H PRN  labetalol, 10 mg, Q4H PRN  melatonin, 6 mg, Nightly PRN  naloxone, 0.02 mg, PRN  nitroGLYCERIN, 0.4 mg, Q5 Min PRN  sodium chloride 0.9%, 10 mL, Q12H PRN    Calorie Containing IV Medications: no significant kcals from medications at this time    Recent Labs   Lab 08/14/24  0825 08/14/24  2012 08/15/24  0219     --  140   K 3.2*  --  3.8   CALCIUM 9.3  --  8.8   PHOS  --   --  2.9   MG 1.90  --  2.00   CO2 19*  --  23   BUN 13.1  --  8.8*   CREATININE 0.57  --  0.60   EGFRNORACEVR >60  --  >60   GLUCOSE 114*  --  86   BILITOT 0.6  --  0.8   ALKPHOS 121  --  98   ALT 19  --  14   AST 31  --  28   ALBUMIN 3.7  --  2.9*   WBC 9.79 9.78 9.15   HGB 15.6 14.1 13.5   HCT 43.7 40.7 39.3     Nutrition Orders:  Diet Heart Healthy  Diet NPO Except for: Sips with Medication      Appetite/Oral Intake: NPO/NPO usually eats well   Factors Affecting Nutritional Intake: NPO  Social Needs Impacting Access to Food: none identified  Food/Sikh/Cultural Preferences: none reported  Food Allergies: none reported  Last Bowel Movement: 24  Wound(s):  none    Comments    (8/15) Pt reported NPO for test; usually eats well; good appetite; no wt loss. Pt education complete on diet tx.     Anthropometrics    Height: 5' 2" (157.5 cm), Height Method: Stated  Last Weight: 54.4 kg (120 lb) (24 1300), Weight Method: Standard Scale  BMI (Calculated): 21.9  BMI Classification: normal (BMI 18.5-24.9)     Ideal Body Weight (IBW), Female: 110 lb     % Ideal Body Weight, Female (lb): 109.09 %                    Usual Body Weight (UBW), k.4 kg  % Usual Body Weight: 100.27     Usual Weight Provided By: patient and EMR weight history    Wt Readings from Last 5 Encounters:   24 54.4 kg " (120 lb)   07/31/24 55.3 kg (122 lb)   07/24/24 55.3 kg (122 lb)   06/24/24 53.1 kg (117 lb 1 oz)   05/29/24 53.1 kg (117 lb 1.6 oz)     Weight Change(s) Since Admission: no wt loss   Wt Readings from Last 1 Encounters:   08/14/24 1300 54.4 kg (120 lb)   08/14/24 0739 54.8 kg (120 lb 13 oz)   Admit Weight: 54.8 kg (120 lb 13 oz) (08/14/24 0739), Weight Method: Standard Scale    Estimated Needs    Weight Used For Calorie Calculations: 54.4 kg (119 lb 14.9 oz)  Energy Calorie Requirements (kcal): 1632 kcal/d; 30 art/kg  Energy Need Method: Kcal/kg  Weight Used For Protein Calculations: 54.4 kg (119 lb 14.9 oz)  Protein Requirements: 70 gm protein/d; 1.3 gm/kg  Fluid Requirements (mL): 1632 ml/d; 1ml/art        Enteral Nutrition     Patient not receiving enteral nutrition at this time.    Parenteral Nutrition     Patient not receiving parenteral nutrition support at this time.    Evaluation of Received Nutrient Intake    Calories: meeting estimated needs NPO for test   Protein: meeting estimated needs    Patient Education     Education Provided: heart healthy diet  Teaching Method: explanation and printed materials  Comprehension: verbalizes understanding  Barriers to Learning: none evident  Expected Compliance: good  Comments: All questions were answered and dietitian's contact information was provided.     Nutrition Diagnosis     PES: Food and nutrition related knowledge deficit related to lack of prior nutrition-related education as evidenced by verbalized limited understanding of diet . (new)       Nutrition Interventions     Intervention(s): modified composition of meals/snacks, multivitamin/mineral supplement therapy, purpose of nutrition education, and collaboration with other providers    Goal: Meet greater than 80% of nutritional needs by follow-up. (new)  Goal: Verbalize understanding of diet by discharge. (new)    Nutrition Goals & Monitoring     Dietitian will monitor: food and beverage intake, weight,  food/nutrition knowledge skill, and glucose/endocrine profile  Discharge planning: continue cardiac diet  Nutrition Risk/Follow-Up: low (follow-up in 5-7 days)   Please consult if re-assessment needed sooner.

## 2024-08-15 NOTE — CARE UPDATE
Patient has persistent chest pain and up-trending troponin since arrival. Latest trop 2.4. Will order EKG stat and start heparin drip. Patient remains HDS currently. Consider cardiology consult in the morning; monitor closely overnight.    Patrick Rogers MD  PGY-1

## 2024-08-15 NOTE — H&P (VIEW-ONLY)
"Cardiology Consult Note     Cardiology Attending: Dr. Cassidy  Cardiology Fellow: Zana Newell MD     Date of Admit: 2024  Date of Consult: 8/15/2024    Reason for Consultation:     "Chest pain"    History of Present Illness:      Gilma Edmonds is a 54 y.o. female with a no PMHx who presented to [facility] on  for acute on chronic episodic chest pain. Patient states her chest pain episodes started several months ago. They are described as a substernal pressure/tightness that would occur with exertion (such as walking/hiking). The episodes initially only lasted a few minutes and would go away with rest. Over the next few months leadingup to this presentation the chest pain episodes became more frequent, would last longer, and would come to be associated with some SoB and palpitations. Over the prior week leading up to her presentation the chest pain episodes would start to occur even at rest and the pain was pretty much constant, thus her presentation to [facility]. The chest pain would radiate to her back, both shoulders, and both arms. She denies any lower extremity edema, syncope episodes, dizziness, lightheadedness, nausea, vomiting.     Past Medical History:     Past Medical History:   Diagnosis Date    Arthritis      Surgical History:     Past Surgical History:   Procedure Laterality Date    ANKLE HARDWARE REMOVAL Left 2024    Procedure: REMOVAL, HARDWARE, ANKLE;  Surgeon: Jared Ibanez MD;  Location: [facility];  Service: Orthopedics;  Laterality: Left;     SECTION      1    left ankle surgery      OPEN REDUCTION AND INTERNAL FIXATION (ORIF) OF PILON FRACTURE Left 2024    Procedure: ORIF, FRACTURE, PILON;  Surgeon: Jared Ibanez MD;  Location: [facility];  Service: Orthopedics;  Laterality: Left;  Open "Broken" Schukla set/hold Screw Removal set    Right habd and wrist scope       Allergies:     Review of patient's allergies indicates:   Allergen Reactions    Meloxicam Swelling "    Sulfa (sulfonamide antibiotics) Hives     On tongue    Sulfamethoxazole-trimethoprim Hives     On tongue     Family History:     Family History   Problem Relation Name Age of Onset    Lung cancer Mother      Rheum arthritis Father      Diabetes Maternal Grandmother      Rheum arthritis Paternal Grandmother      Rheum arthritis Paternal Great-Grandmother       Social History:     Social History     Tobacco Use    Smoking status: Every Day     Current packs/day: 1.00     Average packs/day: 0.5 packs/day for 35.3 years (18.5 ttl pk-yrs)     Types: Cigarettes     Start date: 4/24/1989    Smokeless tobacco: Never   Substance Use Topics    Alcohol use: Yes     Alcohol/week: 4.0 standard drinks of alcohol     Types: 3 Glasses of wine, 1 Shots of liquor per week     Comment: former daily alcohol drinker x 1 month, stopped in april 2024    Drug use: Not Currently     Review of Systems:     14 point ROS negative except for what is noted in the HPI.  Medications:     Home Medications:  Prior to Admission medications    Medication Sig Start Date End Date Taking? Authorizing Provider   gabapentin (NEURONTIN) 300 MG capsule Take 1 capsule (300 mg total) by mouth 3 (three) times daily. 4/30/24 4/30/25 Yes Sherrill Zelaya MD   gabapentin (NEURONTIN) 300 MG capsule Take 1 capsule (300 mg total) by mouth 3 (three) times daily. 7/31/24 7/31/25 Yes Tevin Denton MD   aspirin 81 MG Chew Take 1 tablet (81 mg total) by mouth 2 (two) times a day. 4/30/24 7/31/24  Sherrill Zelaya MD   methocarbamoL (ROBAXIN) 750 MG Tab Take 750 mg by mouth 4 (four) times daily.    Provider, Eleonora   oxyCODONE (ROXICODONE) 5 MG immediate release tablet Take 1 tablet (5 mg total) by mouth every 8 (eight) hours as needed for Pain.  Patient not taking: Reported on 7/31/2024 5/23/24   Sherrill Zelaya MD       Current/Inpatient Medications:  Infusions:   heparin (porcine) in D5W  0-40 Units/kg/hr (Adjusted) Intravenous Continuous 6.2 mL/hr at  "08/15/24 1104 12 Units/kg/hr at 08/15/24 1104    lactated ringers   Intravenous Continuous 125 mL/hr at 08/15/24 0957 New Bag at 08/15/24 0957     Scheduled:   aspirin  81 mg Oral Daily    gabapentin  300 mg Oral TID     PRN:    Current Facility-Administered Medications:     acetaminophen, 650 mg, Oral, Q4H PRN    albuterol-ipratropium, 3 mL, Nebulization, Q4H PRN    dextrose 10%, 12.5 g, Intravenous, PRN    dextrose 10%, 25 g, Intravenous, PRN    glucagon (human recombinant), 1 mg, Intramuscular, PRN    glucose, 16 g, Oral, PRN    glucose, 24 g, Oral, PRN    heparin (PORCINE), 60 Units/kg (Adjusted), Intravenous, PRN    heparin (PORCINE), 30 Units/kg (Adjusted), Intravenous, PRN    hydrALAZINE, 10 mg, Intravenous, Q4H PRN    labetalol, 10 mg, Intravenous, Q4H PRN    melatonin, 6 mg, Oral, Nightly PRN    naloxone, 0.02 mg, Intravenous, PRN    nitroGLYCERIN, 0.4 mg, Sublingual, Q5 Min PRN    sodium chloride 0.9%, 10 mL, Intravenous, Q12H PRN    Objective:     24-hour Vitals:   Temp:  [97.6 °F (36.4 °C)-98.4 °F (36.9 °C)] 97.6 °F (36.4 °C)  Pulse:  [58-77] 62  Resp:  [13-23] 13  SpO2:  [97 %-100 %] 97 %  BP: ()/(60-88) 120/76    Vitals: /76   Pulse 62   Temp 97.6 °F (36.4 °C) (Oral)   Resp 13   Ht 5' 2" (1.575 m)   Wt 54.4 kg (120 lb)   SpO2 97%   Breastfeeding No   BMI 21.95 kg/m²     Intake/Output Summary (Last 24 hours) at 8/15/2024 1516  Last data filed at 8/15/2024 0656  Gross per 24 hour   Intake 1735.33 ml   Output 200 ml   Net 1535.33 ml       Physical Exam:  General: Alert and awake in NAD  HENT: NCAT; anicteric sclera; OP clear with MMM  Cardio: Regular rate and rhythm with normal S1 and S2; no murmurs or rubs  Resp: CTAB; respirations unlabored; no wheezes, crackles or rhonchi  Abdom: Soft, non-distended  Extrem: WWP with no clubbing, cyanosis or edema  Pulses:2+ and symmetric distally  Neuro: AAOx3; cooperative and pleasant with no focal deficits    Labs:   I have reviewed the following " labs below:    Recent Labs   Lab 08/14/24  0825 08/14/24  0932 08/14/24  1104 08/14/24  2012 08/15/24  0219 08/15/24  0809   WBC 9.79  --   --  9.78 9.15  --    HGB 15.6  --   --  14.1 13.5  --    HCT 43.7  --   --  40.7 39.3  --      --   --  293 293  --      --   --   --  140  --    K 3.2*  --   --   --  3.8  --      --   --   --  108*  --    CO2 19*  --   --   --  23  --    BUN 13.1  --   --   --  8.8*  --    CREATININE 0.57  --   --   --  0.60  --    CALCIUM 9.3  --   --   --  8.8  --    MG 1.90  --   --   --  2.00  --    PHOS  --   --   --   --  2.9  --    ALBUMIN 3.7  --   --   --  2.9*  --    BILITOT 0.6  --   --   --  0.8  --    AST 31  --   --   --  28  --    ALT 19  --   --   --  14  --    ALKPHOS 121  --   --   --  98  --    INR  --  1.0  --  1.0  --   --    TROPONINI 0.819*  --    < > 2.690* 2.043* 1.615*   BNP 84.5  --   --   --   --   --     < > = values in this interval not displayed.     Cardiovascular Studies/Imaging:   I have reviewed the following studies below:    Echocardiogram  Results for orders placed during the hospital encounter of 08/14/24    Echo    Interpretation Summary    Left Ventricle: The left ventricle is normal in size. Normal wall thickness. There is normal systolic function. Biplane (2D) method of discs ejection fraction is 63%.    Right Ventricle: Mild right ventricular enlargement. Systolic function is normal.    Mitral Valve: There is mild to moderate regurgitation.    Pulmonary Artery: The estimated pulmonary artery systolic pressure is 27 mmHg.    IVC/SVC: Intermediate venous pressure at 8 mmHg.      Stress Test  No results found for this or any previous visit.       Coronary Angiogram  No results found for this or any previous visit.           Assessment:     Gilma Edmonds is a 55 y/o F w/ no reported PMHx that presents for acute on chronic worsening substernal chest pain/pressure w/ associated SoB & palpitations over the past few months. Initial workup  with rising troponins that have since peaked at 2.6 and are now down trending. No notable EKG findings of ST elevations/depressions/TWI. Cardiology consulted for further evaluation    Plan/Recommendations:     NSTEMI  - symptoms & initial workup concerning for NSTEMI. Troponins peaked at 2.6 now down trending. EKG without ST elevations/depressions or TWI.  - TTE obtained: normal LVEF, no obvious wall motion abnormalities or dilated cardiomyopathy.  - Plan for coronary angiogram Friday afternoon. Patient has been consented. NPO at midnight  - Patient has been ASA & plavix loaded on 14Aug24. Continue ASA 81mg qdaily.  - Will start daily plavix after angiogram.  - Continue heparin gtt until after angiogram.  - prn sublingual nitroglycerin for worsening sx.  - continuous cardiac monitoring  - maintain K >4, Mg >2    Zana Newell  [facility] Cardiology Fellow  08/15/2024 3:16 PM

## 2024-08-16 ENCOUNTER — HOSPITAL ENCOUNTER (INPATIENT)
Facility: HOSPITAL | Age: 54
LOS: 4 days | Discharge: HOME | DRG: 282 | End: 2024-08-20
Attending: THORACIC SURGERY (CARDIOTHORACIC VASCULAR SURGERY) | Admitting: THORACIC SURGERY (CARDIOTHORACIC VASCULAR SURGERY)
Payer: MEDICAID

## 2024-08-16 DIAGNOSIS — I25.10 CAD (CORONARY ARTERY DISEASE): ICD-10-CM

## 2024-08-16 DIAGNOSIS — I25.10 CORONARY ARTERY DISEASE INVOLVING NATIVE CORONARY ARTERY OF NATIVE HEART WITHOUT ANGINA PECTORIS: ICD-10-CM

## 2024-08-16 DIAGNOSIS — I25.10 CVD (CARDIOVASCULAR DISEASE): ICD-10-CM

## 2024-08-16 DIAGNOSIS — I21.4 NSTEMI (NON-ST ELEVATED MYOCARDIAL INFARCTION): Primary | ICD-10-CM

## 2024-08-16 DIAGNOSIS — R07.9 CHEST PAIN: ICD-10-CM

## 2024-08-16 LAB
ALBUMIN SERPL-MCNC: 2.9 G/DL (ref 3.5–5)
ALBUMIN/GLOB SERPL: 1.1 RATIO (ref 1.1–2)
ALP SERPL-CCNC: 91 UNIT/L (ref 40–150)
ALT SERPL-CCNC: 23 UNIT/L (ref 0–55)
ANION GAP SERPL CALC-SCNC: 8 MEQ/L
APTT PPP: 46.8 SECONDS (ref 23.2–33.7)
APTT PPP: 55.7 SECONDS (ref 23.2–33.7)
AST SERPL-CCNC: 42 UNIT/L (ref 5–34)
BACTERIA #/AREA URNS AUTO: NORMAL /HPF
BILIRUB SERPL-MCNC: 0.3 MG/DL
BILIRUB UR QL STRIP.AUTO: NEGATIVE
BUN SERPL-MCNC: 10.9 MG/DL (ref 9.8–20.1)
CALCIUM SERPL-MCNC: 8.6 MG/DL (ref 8.4–10.2)
CHLORIDE SERPL-SCNC: 110 MMOL/L (ref 98–107)
CLARITY UR: CLEAR
CO2 SERPL-SCNC: 23 MMOL/L (ref 22–29)
COLOR UR AUTO: COLORLESS
CREAT SERPL-MCNC: 0.59 MG/DL (ref 0.55–1.02)
CREAT/UREA NIT SERPL: 18
GFR SERPLBLD CREATININE-BSD FMLA CKD-EPI: >60 ML/MIN/1.73/M2
GLOBULIN SER-MCNC: 2.6 GM/DL (ref 2.4–3.5)
GLUCOSE SERPL-MCNC: 105 MG/DL (ref 74–100)
GLUCOSE UR QL STRIP: NORMAL
HGB UR QL STRIP: NEGATIVE
HOLD SPECIMEN: NORMAL
KETONES UR QL STRIP: NEGATIVE
LEUKOCYTE ESTERASE UR QL STRIP: NEGATIVE
MAGNESIUM SERPL-MCNC: 1.7 MG/DL (ref 1.6–2.6)
NITRITE UR QL STRIP: NEGATIVE
PH UR STRIP: 7.5 [PH]
PHOSPHATE SERPL-MCNC: 3.8 MG/DL (ref 2.3–4.7)
POCT GLUCOSE: 131 MG/DL (ref 70–110)
POTASSIUM SERPL-SCNC: 3.7 MMOL/L (ref 3.5–5.1)
PROT SERPL-MCNC: 5.5 GM/DL (ref 6.4–8.3)
PROT UR QL STRIP: NEGATIVE
RBC #/AREA URNS AUTO: NORMAL /HPF
SODIUM SERPL-SCNC: 141 MMOL/L (ref 136–145)
SP GR UR STRIP.AUTO: 1.01 (ref 1–1.03)
SQUAMOUS #/AREA URNS LPF: NORMAL /HPF
UROBILINOGEN UR STRIP-ACNC: NORMAL
WBC #/AREA URNS AUTO: NORMAL /HPF

## 2024-08-16 PROCEDURE — 80053 COMPREHEN METABOLIC PANEL: CPT

## 2024-08-16 PROCEDURE — C1769 GUIDE WIRE: HCPCS | Performed by: INTERNAL MEDICINE

## 2024-08-16 PROCEDURE — 85730 THROMBOPLASTIN TIME PARTIAL: CPT | Performed by: INTERNAL MEDICINE

## 2024-08-16 PROCEDURE — 83735 ASSAY OF MAGNESIUM: CPT

## 2024-08-16 PROCEDURE — 25000242 PHARM REV CODE 250 ALT 637 W/ HCPCS

## 2024-08-16 PROCEDURE — 4A023N7 MEASUREMENT OF CARDIAC SAMPLING AND PRESSURE, LEFT HEART, PERCUTANEOUS APPROACH: ICD-10-PCS | Performed by: INTERNAL MEDICINE

## 2024-08-16 PROCEDURE — 21400001 HC TELEMETRY ROOM

## 2024-08-16 PROCEDURE — 85730 THROMBOPLASTIN TIME PARTIAL: CPT | Performed by: THORACIC SURGERY (CARDIOTHORACIC VASCULAR SURGERY)

## 2024-08-16 PROCEDURE — 25000003 PHARM REV CODE 250: Performed by: INTERNAL MEDICINE

## 2024-08-16 PROCEDURE — 36415 COLL VENOUS BLD VENIPUNCTURE: CPT | Performed by: THORACIC SURGERY (CARDIOTHORACIC VASCULAR SURGERY)

## 2024-08-16 PROCEDURE — 84100 ASSAY OF PHOSPHORUS: CPT

## 2024-08-16 PROCEDURE — 27000221 HC OXYGEN, UP TO 24 HOURS

## 2024-08-16 PROCEDURE — 81015 MICROSCOPIC EXAM OF URINE: CPT | Performed by: THORACIC SURGERY (CARDIOTHORACIC VASCULAR SURGERY)

## 2024-08-16 PROCEDURE — 99152 MOD SED SAME PHYS/QHP 5/>YRS: CPT | Performed by: INTERNAL MEDICINE

## 2024-08-16 PROCEDURE — C1894 INTRO/SHEATH, NON-LASER: HCPCS | Performed by: INTERNAL MEDICINE

## 2024-08-16 PROCEDURE — 25000003 PHARM REV CODE 250

## 2024-08-16 PROCEDURE — 11000001 HC ACUTE MED/SURG PRIVATE ROOM

## 2024-08-16 PROCEDURE — 63600175 PHARM REV CODE 636 W HCPCS

## 2024-08-16 PROCEDURE — 63600175 PHARM REV CODE 636 W HCPCS: Performed by: PHYSICIAN ASSISTANT

## 2024-08-16 PROCEDURE — 81001 URINALYSIS AUTO W/SCOPE: CPT | Performed by: THORACIC SURGERY (CARDIOTHORACIC VASCULAR SURGERY)

## 2024-08-16 PROCEDURE — B2111ZZ FLUOROSCOPY OF MULTIPLE CORONARY ARTERIES USING LOW OSMOLAR CONTRAST: ICD-10-PCS | Performed by: INTERNAL MEDICINE

## 2024-08-16 PROCEDURE — 94761 N-INVAS EAR/PLS OXIMETRY MLT: CPT

## 2024-08-16 PROCEDURE — 25500020 PHARM REV CODE 255: Performed by: INTERNAL MEDICINE

## 2024-08-16 PROCEDURE — 02703ZZ DILATION OF CORONARY ARTERY, ONE ARTERY, PERCUTANEOUS APPROACH: ICD-10-PCS | Performed by: INTERNAL MEDICINE

## 2024-08-16 PROCEDURE — C1887 CATHETER, GUIDING: HCPCS | Performed by: INTERNAL MEDICINE

## 2024-08-16 PROCEDURE — 99153 MOD SED SAME PHYS/QHP EA: CPT | Performed by: INTERNAL MEDICINE

## 2024-08-16 PROCEDURE — 93458 L HRT ARTERY/VENTRICLE ANGIO: CPT | Performed by: INTERNAL MEDICINE

## 2024-08-16 PROCEDURE — C1760 CLOSURE DEV, VASC: HCPCS | Performed by: INTERNAL MEDICINE

## 2024-08-16 PROCEDURE — 94640 AIRWAY INHALATION TREATMENT: CPT

## 2024-08-16 PROCEDURE — 99900035 HC TECH TIME PER 15 MIN (STAT)

## 2024-08-16 PROCEDURE — 25000003 PHARM REV CODE 250: Performed by: PHYSICIAN ASSISTANT

## 2024-08-16 PROCEDURE — 63600175 PHARM REV CODE 636 W HCPCS: Performed by: INTERNAL MEDICINE

## 2024-08-16 PROCEDURE — 36415 COLL VENOUS BLD VENIPUNCTURE: CPT

## 2024-08-16 RX ORDER — HEPARIN SODIUM,PORCINE/D5W 25000/250
0-40 INTRAVENOUS SOLUTION INTRAVENOUS CONTINUOUS
Status: DISCONTINUED | OUTPATIENT
Start: 2024-08-16 | End: 2024-08-19

## 2024-08-16 RX ORDER — POTASSIUM CHLORIDE 7.45 MG/ML
10 INJECTION INTRAVENOUS
Status: COMPLETED | OUTPATIENT
Start: 2024-08-16 | End: 2024-08-16

## 2024-08-16 RX ORDER — MAGNESIUM SULFATE 1 G/100ML
1 INJECTION INTRAVENOUS ONCE
Status: COMPLETED | OUTPATIENT
Start: 2024-08-16 | End: 2024-08-16

## 2024-08-16 RX ORDER — FENTANYL CITRATE 50 UG/ML
INJECTION, SOLUTION INTRAMUSCULAR; INTRAVENOUS
Status: DISCONTINUED | OUTPATIENT
Start: 2024-08-16 | End: 2024-08-16 | Stop reason: HOSPADM

## 2024-08-16 RX ORDER — HYDROCODONE BITARTRATE AND ACETAMINOPHEN 5; 325 MG/1; MG/1
1 TABLET ORAL EVERY 6 HOURS PRN
Status: DISCONTINUED | OUTPATIENT
Start: 2024-08-16 | End: 2024-08-17

## 2024-08-16 RX ORDER — MIDAZOLAM HYDROCHLORIDE 1 MG/ML
INJECTION INTRAMUSCULAR; INTRAVENOUS
Status: DISCONTINUED | OUTPATIENT
Start: 2024-08-16 | End: 2024-08-16 | Stop reason: HOSPADM

## 2024-08-16 RX ORDER — SODIUM CHLORIDE 9 MG/ML
INJECTION, SOLUTION INTRAVENOUS CONTINUOUS
Status: DISCONTINUED | OUTPATIENT
Start: 2024-08-16 | End: 2024-08-16 | Stop reason: HOSPADM

## 2024-08-16 RX ORDER — ONDANSETRON 4 MG/1
8 TABLET, ORALLY DISINTEGRATING ORAL EVERY 8 HOURS PRN
Status: DISCONTINUED | OUTPATIENT
Start: 2024-08-16 | End: 2024-08-16 | Stop reason: HOSPADM

## 2024-08-16 RX ORDER — PANTOPRAZOLE SODIUM 40 MG/1
40 TABLET, DELAYED RELEASE ORAL DAILY
COMMUNITY

## 2024-08-16 RX ORDER — DIPHENHYDRAMINE HYDROCHLORIDE 50 MG/ML
INJECTION INTRAMUSCULAR; INTRAVENOUS
Status: DISCONTINUED | OUTPATIENT
Start: 2024-08-16 | End: 2024-08-16 | Stop reason: HOSPADM

## 2024-08-16 RX ORDER — LIDOCAINE HYDROCHLORIDE 10 MG/ML
INJECTION, SOLUTION EPIDURAL; INFILTRATION; INTRACAUDAL; PERINEURAL
Status: DISCONTINUED | OUTPATIENT
Start: 2024-08-16 | End: 2024-08-16 | Stop reason: HOSPADM

## 2024-08-16 RX ORDER — HYDROCODONE BITARTRATE AND ACETAMINOPHEN 5; 325 MG/1; MG/1
1 TABLET ORAL EVERY 4 HOURS PRN
Status: DISCONTINUED | OUTPATIENT
Start: 2024-08-16 | End: 2024-08-16 | Stop reason: HOSPADM

## 2024-08-16 RX ORDER — MORPHINE SULFATE 2 MG/ML
2 INJECTION, SOLUTION INTRAMUSCULAR; INTRAVENOUS EVERY 4 HOURS PRN
Status: DISCONTINUED | OUTPATIENT
Start: 2024-08-16 | End: 2024-08-16 | Stop reason: HOSPADM

## 2024-08-16 RX ORDER — NITROGLYCERIN 20 MG/100ML
INJECTION INTRAVENOUS
Status: DISCONTINUED | OUTPATIENT
Start: 2024-08-16 | End: 2024-08-16 | Stop reason: HOSPADM

## 2024-08-16 RX ADMIN — ASPIRIN 81 MG 81 MG: 81 TABLET ORAL at 08:08

## 2024-08-16 RX ADMIN — ACETAMINOPHEN 650 MG: 325 TABLET, FILM COATED ORAL at 08:08

## 2024-08-16 RX ADMIN — NITROGLYCERIN 0.4 MG: 0.4 TABLET SUBLINGUAL at 09:08

## 2024-08-16 RX ADMIN — HEPARIN SODIUM 18 UNITS/KG/HR: 10000 INJECTION, SOLUTION INTRAVENOUS at 08:08

## 2024-08-16 RX ADMIN — SODIUM CHLORIDE, POTASSIUM CHLORIDE, SODIUM LACTATE AND CALCIUM CHLORIDE: 600; 310; 30; 20 INJECTION, SOLUTION INTRAVENOUS at 11:08

## 2024-08-16 RX ADMIN — GABAPENTIN 300 MG: 300 CAPSULE ORAL at 08:08

## 2024-08-16 RX ADMIN — SODIUM CHLORIDE: 9 INJECTION, SOLUTION INTRAVENOUS at 06:08

## 2024-08-16 RX ADMIN — POTASSIUM CHLORIDE 10 MEQ: 7.46 INJECTION, SOLUTION INTRAVENOUS at 11:08

## 2024-08-16 RX ADMIN — HYDROCODONE BITARTRATE AND ACETAMINOPHEN 1 TABLET: 5; 325 TABLET ORAL at 10:08

## 2024-08-16 RX ADMIN — NITROGLYCERIN 0.4 MG: 0.4 TABLET SUBLINGUAL at 07:08

## 2024-08-16 RX ADMIN — NITROGLYCERIN 0.4 MG: 0.4 TABLET SUBLINGUAL at 12:08

## 2024-08-16 RX ADMIN — GABAPENTIN 300 MG: 300 CAPSULE ORAL at 04:08

## 2024-08-16 RX ADMIN — IPRATROPIUM BROMIDE AND ALBUTEROL SULFATE 3 ML: .5; 3 SOLUTION RESPIRATORY (INHALATION) at 07:08

## 2024-08-16 RX ADMIN — POTASSIUM CHLORIDE 10 MEQ: 7.46 INJECTION, SOLUTION INTRAVENOUS at 10:08

## 2024-08-16 RX ADMIN — MAGNESIUM SULFATE IN DEXTROSE 1 G: 10 INJECTION, SOLUTION INTRAVENOUS at 08:08

## 2024-08-16 RX ADMIN — NITROGLYCERIN 1 INCH: 20 OINTMENT TOPICAL at 10:08

## 2024-08-16 RX ADMIN — IPRATROPIUM BROMIDE AND ALBUTEROL SULFATE 3 ML: .5; 3 SOLUTION RESPIRATORY (INHALATION) at 11:08

## 2024-08-16 RX ADMIN — HEPARIN SODIUM 18 UNITS/KG/HR: 10000 INJECTION, SOLUTION INTRAVENOUS at 10:08

## 2024-08-16 RX ADMIN — MELATONIN TAB 3 MG 6 MG: 3 TAB at 08:08

## 2024-08-16 NOTE — NURSING
Called and spoke to lab regarding aptt drawn approx 1 hr ago at 2245,  Dea states she is waiting on her controls for the machine that does aptt to run. States should be approx. 20 minutes until we get a result. Dali HUMPHREY notified with verbal understanding.

## 2024-08-16 NOTE — INTERVAL H&P NOTE
The patient has been examined and the H&P has been reviewed:    I concur with the findings and no changes have occurred since H&P was written.    Procedure risks, benefits and alternative options discussed and understood by patient/family.          Active Hospital Problems    Diagnosis  POA    *Other chest pain [R07.89]  Unknown    Tobacco dependency [F17.200]  Unknown      Resolved Hospital Problems   No resolved problems to display.

## 2024-08-16 NOTE — PROGRESS NOTES
"Cardiology Consult Note     Cardiology Attending: Dr. Cassidy  Cardiology Fellow: Zana Newell MD     Date of Admit: 2024  Date of Consult: 2024    Reason for Consultation:     "Chest pain"    Subjective:      Patient seen and examined this morning. Had a few episodes of chest pain relieved with sublingual nitroglycerin as well as a coughing episode after an overnight breathing treatment. This morning she is stable and her CP is controlled.     HPI:  Gilma Edmonds is a 54 y.o. female with a no PMHx who presented to [facility] on  for acute on chronic episodic chest pain. Patient states her chest pain episodes started several months ago. They are described as a substernal pressure/tightness that would occur with exertion (such as walking/hiking). The episodes initially only lasted a few minutes and would go away with rest. Over the next few months leadingup to this presentation the chest pain episodes became more frequent, would last longer, and would come to be associated with some SoB and palpitations. Over the prior week leading up to her presentation the chest pain episodes would start to occur even at rest and the pain was pretty much constant, thus her presentation to [facility]. The chest pain would radiate to her back, both shoulders, and both arms. She denies any lower extremity edema, syncope episodes, dizziness, lightheadedness, nausea, vomiting.     Past Medical History:     Past Medical History:   Diagnosis Date    Arthritis      Surgical History:     Past Surgical History:   Procedure Laterality Date    ANKLE HARDWARE REMOVAL Left 2024    Procedure: REMOVAL, HARDWARE, ANKLE;  Surgeon: Jared Ibanez MD;  Location: [facility];  Service: Orthopedics;  Laterality: Left;     SECTION      1    left ankle surgery      OPEN REDUCTION AND INTERNAL FIXATION (ORIF) OF PILON FRACTURE Left 2024    Procedure: ORIF, FRACTURE, PILON;  Surgeon: Jared Ibanez MD;  Location: [facility] OR;  " "Service: Orthopedics;  Laterality: Left;  Open "Broken" Schukla set/hold Screw Removal set    Right habd and wrist scope  2010     Allergies:     Review of patient's allergies indicates:   Allergen Reactions    Meloxicam Swelling    Sulfa (sulfonamide antibiotics) Hives     On tongue    Sulfamethoxazole-trimethoprim Hives     On tongue     Family History:     Family History   Problem Relation Name Age of Onset    Lung cancer Mother      Rheum arthritis Father      Diabetes Maternal Grandmother      Rheum arthritis Paternal Grandmother      Rheum arthritis Paternal Great-Grandmother       Social History:     Social History     Tobacco Use    Smoking status: Every Day     Current packs/day: 1.00     Average packs/day: 0.5 packs/day for 35.3 years (18.5 ttl pk-yrs)     Types: Cigarettes     Start date: 4/24/1989    Smokeless tobacco: Never   Substance Use Topics    Alcohol use: Yes     Alcohol/week: 4.0 standard drinks of alcohol     Types: 3 Glasses of wine, 1 Shots of liquor per week     Comment: former daily alcohol drinker x 1 month, stopped in april 2024    Drug use: Not Currently     Review of Systems:     14 point ROS negative except for what is noted in the HPI.  Medications:     Home Medications:  Prior to Admission medications    Medication Sig Start Date End Date Taking? Authorizing Provider   gabapentin (NEURONTIN) 300 MG capsule Take 1 capsule (300 mg total) by mouth 3 (three) times daily. 4/30/24 4/30/25 Yes Sherrill Zelaya MD   gabapentin (NEURONTIN) 300 MG capsule Take 1 capsule (300 mg total) by mouth 3 (three) times daily. 7/31/24 7/31/25 Yes Tevin Denton MD   aspirin 81 MG Chew Take 1 tablet (81 mg total) by mouth 2 (two) times a day. 4/30/24 7/31/24  Sherrill Zelaya MD   methocarbamoL (ROBAXIN) 750 MG Tab Take 750 mg by mouth 4 (four) times daily.    Provider, Historical   oxyCODONE (ROXICODONE) 5 MG immediate release tablet Take 1 tablet (5 mg total) by mouth every 8 (eight) hours as " "needed for Pain.  Patient not taking: Reported on 7/31/2024 5/23/24   Sherrill Zelaya MD       Current/Inpatient Medications:  Infusions:   heparin (porcine) in D5W  0-40 Units/kg/hr (Adjusted) Intravenous Continuous 9.3 mL/hr at 08/16/24 0829 18 Units/kg/hr at 08/16/24 0829    lactated ringers   Intravenous Continuous 125 mL/hr at 08/16/24 1152 New Bag at 08/16/24 1152     Scheduled:   aspirin  81 mg Oral Daily    gabapentin  300 mg Oral TID     PRN:    Current Facility-Administered Medications:     acetaminophen, 650 mg, Oral, Q4H PRN    albuterol-ipratropium, 3 mL, Nebulization, Q4H PRN    dextrose 10%, 12.5 g, Intravenous, PRN    dextrose 10%, 25 g, Intravenous, PRN    glucagon (human recombinant), 1 mg, Intramuscular, PRN    glucose, 16 g, Oral, PRN    glucose, 24 g, Oral, PRN    heparin (PORCINE), 60 Units/kg (Adjusted), Intravenous, PRN    heparin (PORCINE), 30 Units/kg (Adjusted), Intravenous, PRN    hydrALAZINE, 10 mg, Intravenous, Q4H PRN    labetalol, 10 mg, Intravenous, Q4H PRN    melatonin, 6 mg, Oral, Nightly PRN    naloxone, 0.02 mg, Intravenous, PRN    nitroGLYCERIN, 0.4 mg, Sublingual, Q5 Min PRN    sodium chloride 0.9%, 10 mL, Intravenous, Q12H PRN    sodium chloride 0.9%, 10 mL, Intravenous, PRN    Objective:     24-hour Vitals:   Temp:  [97 °F (36.1 °C)-98.3 °F (36.8 °C)] 98 °F (36.7 °C)  Pulse:  [54-75] 54  Resp:  [13-18] 18  SpO2:  [97 %-99 %] 98 %  BP: (101-137)/(62-84) 137/81    Vitals: /81   Pulse (!) 54   Temp 98 °F (36.7 °C) (Oral)   Resp 18   Ht 5' 2" (1.575 m)   Wt 54.4 kg (120 lb)   SpO2 98%   Breastfeeding No   BMI 21.95 kg/m²     Intake/Output Summary (Last 24 hours) at 8/16/2024 1208  Last data filed at 8/16/2024 0507  Gross per 24 hour   Intake 2918.84 ml   Output --   Net 2918.84 ml       Physical Exam:  General: Alert and awake in NAD  HENT: NCAT; anicteric sclera; OP clear with MMM  Cardio: Regular rate and rhythm with normal S1 and S2; no murmurs or rubs  Resp: " CTAB; respirations unlabored; no wheezes, crackles or rhonchi  Abdom: Soft, non-distended  Extrem: WWP with no clubbing, cyanosis or edema  Pulses:2+ and symmetric distally  Neuro: AAOx3; cooperative and pleasant with no focal deficits    Labs:   I have reviewed the following labs below:    Recent Labs   Lab 08/14/24  0825 08/14/24  0932 08/14/24  1104 08/14/24  2012 08/15/24  0219 08/15/24  0809 08/15/24  1442 08/16/24  0703   WBC 9.79  --   --  9.78 9.15  --   --   --    HGB 15.6  --   --  14.1 13.5  --   --   --    HCT 43.7  --   --  40.7 39.3  --   --   --      --   --  293 293  --   --   --      --   --   --  140  --   --  141   K 3.2*  --   --   --  3.8  --   --  3.7     --   --   --  108*  --   --  110*   CO2 19*  --   --   --  23  --   --  23   BUN 13.1  --   --   --  8.8*  --   --  10.9   CREATININE 0.57  --   --   --  0.60  --   --  0.59   CALCIUM 9.3  --   --   --  8.8  --   --  8.6   MG 1.90  --   --   --  2.00  --   --  1.70   PHOS  --   --   --   --  2.9  --   --  3.8   ALBUMIN 3.7  --   --   --  2.9*  --   --  2.9*   BILITOT 0.6  --   --   --  0.8  --   --  0.3   AST 31  --   --   --  28  --   --  42*   ALT 19  --   --   --  14  --   --  23   ALKPHOS 121  --   --   --  98  --   --  91   INR  --  1.0  --  1.0  --   --   --   --    TROPONINI 0.819*  --    < > 2.690* 2.043* 1.615* 1.237*  --    BNP 84.5  --   --   --   --   --   --   --     < > = values in this interval not displayed.     Cardiovascular Studies/Imaging:   I have reviewed the following studies below:    Echocardiogram  Results for orders placed during the hospital encounter of 08/14/24    Echo    Interpretation Summary    Left Ventricle: The left ventricle is normal in size. Normal wall thickness. There is normal systolic function. Biplane (2D) method of discs ejection fraction is 63%.    Right Ventricle: Mild right ventricular enlargement. Systolic function is normal.    Mitral Valve: There is mild to moderate  regurgitation.    Pulmonary Artery: The estimated pulmonary artery systolic pressure is 27 mmHg.    IVC/SVC: Intermediate venous pressure at 8 mmHg.      Stress Test  No results found for this or any previous visit.       Coronary Angiogram  No results found for this or any previous visit.           Assessment:     Gilma Edmonds is a 53 y/o F w/ no reported PMHx that presents for acute on chronic worsening substernal chest pain/pressure w/ associated SoB & palpitations over the past few months. Initial workup with rising troponins that have since peaked at 2.6 and are now down trending. No notable EKG findings of ST elevations/depressions/TWI. Cardiology consulted for further evaluation    Plan/Recommendations:     NSTEMI  - symptoms & initial workup concerning for NSTEMI. Troponins peaked at 2.6 now down trending. EKG without ST elevations/depressions or TWI.  - TTE obtained: normal LVEF, no obvious wall motion abnormalities or dilated cardiomyopathy.  - Plan for coronary angiogram today.  - Patient has been ASA & plavix loaded on [id number]. Continue ASA 81mg qdaily.  - Will start daily plavix after angiogram.  - Continue heparin gtt until after angiogram.  - prn sublingual nitroglycerin for worsening sx. If not relieved with NTG then can add on morphine vs beta blocker.  - continuous cardiac monitoring  - maintain K >4, Mg >2    Zana Newell  U Cardiology Fellow  08/16/2024 3:16 PM

## 2024-08-16 NOTE — PROGRESS NOTES
[facility] Medicine Wards   Progress Note      Resident Team: [facility] List 2  Attending Physician: Annika Spicer MD  Resident: Jing Almendarez DO, Susana Rac, MD  Intern: Mary Kay Perez MD   Hospital Length of Stay: 2 days    Subjective   Brief HPI:  Gilma Edmonds is a 54 y.o. female with no significant past medical history, who presented to [facility] ED on 8/14/2024  with a primary complaint of chest pain. She states that she has had intermittent chest pain for the past couple of months. It previously resolved without intervention. As of 3 days ago, the chest pain has become constant and is described as tightness that radiates to both shoulders, down both arms, and to her mid back. It is mainly located midline and over her right chest, but spreads to both sides during activity. It is exacerbated by walking and activity. She reports drinking 1 oz of alcohol this morning which alleviated the chest pain slightly. She also reports a history of intermittent palpitations at rest that self resolve after a couple of minutes. Due to the chest pain being constant for the past three days, she presented for further evaluation.      In the ED, vitals upon arrival were T 97.8, /81, , RR 20, SpO2 97% on RA. Labs notable for K 3.2, bicarb 19, glucose 114. EtOH elevated. Troponin elevated at 0.819 and then 1.805 three hours later. BNP normal. PT/INR normal. EKG performed showed normal sinus rhythm with some T wave inversions in leads III and aVF. CXR performed showing no acute cardiopulomonary process. She was given NTG but states that it has not changed her pain much. She was also given  mL bolus. Medicine was consulted for further workup and management of chest pain.     Interval History: NAEON. Reporting some chest pain while lying down that was somewhat relieved with NTG. Otherwise no complaints. Has been NPO since midnight in anticipation of scheduled angiogram today.     Review of Systems:  Review of  Systems   Constitutional:  Negative for chills and fever.   Respiratory:  Negative for cough and sputum production.    Cardiovascular:  Positive for chest pain. Negative for palpitations and leg swelling.   Gastrointestinal:  Negative for abdominal pain, constipation, diarrhea, heartburn, nausea and vomiting.   Genitourinary:  Negative for dysuria and frequency.   Neurological:  Negative for dizziness and headaches.        Objective     Vital Signs (Most Recent):  Temp: 98.3 °F (36.8 °C) (08/16/24 0334)  Pulse: 62 (08/16/24 0334)  Resp: 18 (08/16/24 0334)  BP: 135/82 (08/16/24 0334)  SpO2: 98 % (08/16/24 0334) Vital Signs (24h Range):  Temp:  [97 °F (36.1 °C)-98.3 °F (36.8 °C)] 98.3 °F (36.8 °C)  Pulse:  [62-75] 62  Resp:  [13-18] 18  SpO2:  [97 %-99 %] 98 %  BP: (101-135)/(62-84) 135/82     Physical Examination:  Physical Exam  Vitals reviewed.   Constitutional:       General: She is not in acute distress.     Appearance: She is not diaphoretic.   HENT:      Mouth/Throat:      Pharynx: Oropharynx is clear.   Cardiovascular:      Rate and Rhythm: Normal rate and regular rhythm.      Pulses: Normal pulses.      Heart sounds: Normal heart sounds. No murmur heard.  Pulmonary:      Effort: Pulmonary effort is normal. No respiratory distress.      Breath sounds: Normal breath sounds. No wheezing, rhonchi or rales.   Abdominal:      General: Bowel sounds are normal. There is no distension.      Palpations: Abdomen is soft.      Tenderness: There is no abdominal tenderness.   Musculoskeletal:      Right lower leg: No edema.      Left lower leg: No edema.   Skin:     General: Skin is warm and dry.   Neurological:      Mental Status: She is alert and oriented to person, place, and time.   Psychiatric:         Behavior: Behavior is cooperative.         Laboratory:  Most Recent Data:  Recent Lab Results  (Last 5 results in the past 24 hours)        08/15/24  2258   08/15/24  2248   08/15/24  2049   08/15/24  1703    08/15/24  1653        PTT   52.8  Comment: For Minimal Heparin Infusion, the goal aPTT 64-85 seconds corresponds to an anti-Xa of 0.3-0.5.    For Low Intensity and High Intensity Heparin, the goal aPTT  seconds corresponds to an anti-Xa of 0.3-0.7       51.3  Comment: For Minimal Heparin Infusion, the goal aPTT 64-85 seconds corresponds to an anti-Xa of 0.3-0.5.    For Low Intensity and High Intensity Heparin, the goal aPTT  seconds corresponds to an anti-Xa of 0.3-0.7       Extra Tube Hold for add-ons.  Comment: Auto resulted.          Hold for add-ons.  Comment: Auto resulted.            POCT Glucose     129                                    Radiology:  Imaging Results              X-Ray Chest PA And Lateral (Final result)  Result time 08/14/24 08:33:23      Final result by Dioni Dewitt MD (08/14/24 08:33:23)                   Impression:      No acute cardiopulmonary process identified.      Electronically signed by: Dioni Dewitt  Date:    08/14/2024  Time:    08:33               Narrative:    EXAMINATION:  XR CHEST PA AND LATERAL    CLINICAL HISTORY:  Chest Pain;    COMPARISON:  July 24, 2024.    FINDINGS:  Cardiopericardial silhouette is within normal limits.  No acute dense focal or segmental consolidation, congestion, pleural effusion or pneumothorax.  There is pectus excavatum deformity.                                      Current Medications:     Infusions:   heparin (porcine) in D5W  0-40 Units/kg/hr (Adjusted) Intravenous Continuous 8.3 mL/hr at 08/16/24 0507 16 Units/kg/hr at 08/16/24 0507    lactated ringers   Intravenous Continuous 125 mL/hr at 08/16/24 0507 Rate Verify at 08/16/24 0507        Scheduled:   aspirin  81 mg Oral Daily    gabapentin  300 mg Oral TID        PRN:    Current Facility-Administered Medications:     acetaminophen, 650 mg, Oral, Q4H PRN    albuterol-ipratropium, 3 mL, Nebulization, Q4H PRN    dextrose 10%, 12.5 g, Intravenous, PRN    dextrose 10%, 25 g,  Intravenous, PRN    glucagon (human recombinant), 1 mg, Intramuscular, PRN    glucose, 16 g, Oral, PRN    glucose, 24 g, Oral, PRN    heparin (PORCINE), 60 Units/kg (Adjusted), Intravenous, PRN    heparin (PORCINE), 30 Units/kg (Adjusted), Intravenous, PRN    hydrALAZINE, 10 mg, Intravenous, Q4H PRN    labetalol, 10 mg, Intravenous, Q4H PRN    melatonin, 6 mg, Oral, Nightly PRN    naloxone, 0.02 mg, Intravenous, PRN    nitroGLYCERIN, 0.4 mg, Sublingual, Q5 Min PRN    sodium chloride 0.9%, 10 mL, Intravenous, Q12H PRN    sodium chloride 0.9%, 10 mL, Intravenous, PRN      Intake/Output Summary (Last 24 hours) at 8/16/2024 0616  Last data filed at 8/16/2024 0507  Gross per 24 hour   Intake 4654.17 ml   Output --   Net 4654.17 ml       Lines/Drains/Airways       Peripheral Intravenous Line  Duration                  Peripheral IV - Single Lumen 08/14/24 0824 20 G Anterior;Right Forearm 1 day                    Assessment and Plan    Chest pain, concern for NSTEMI  Elevated troponin  Intermittent palpitations   - BP on arrival 114/81, has remained stable. Not on antihypertensive medications at home   - EKG NSR 8/14, sinus elvis 8/15   - Troponins 0.819, trended to peak of 2.69. Has now decreased to 1.615    - UDS +cannabinoids   - TTE 8/14/24 EF 63%, mild right ventricular enlargement, mild-moderate mitral regurgitation, PAS pressure 27 mmHg   - Loading dose ASA and Plavix given 8/14/24   - Continue ASA 81 mg daily   - On heparin drip, Plavix held    - Cardiology consulted. Plan for [facility] this afternoon     Hypokalemia - resolved   - K 3.2 on admission. Continue to monitor. Replete as needed     Neuropathic pain after ankle surgery   - Continue home gabapentin 300 TID    Anion gap metabolic acidosis - resolved   - Bicarb 19, anion gap 14   - Likely due to EtOH consumption        CODE STATUS: Full  Access: peripheral IV  Antibiotics: None  Diet: NPO  DVT Prophylaxis: Heparin  GI Prophylaxis: None  Fluids: LR @ 125  mL/hr      Disposition: Admitted for workup of chest pain, concern for NSTEMI. Undergoing LHC today.       Mary Kay Perez MD  [facility]

## 2024-08-16 NOTE — PROGRESS NOTES
RECOMMENDATIONS  BASED ON CORONARY ANGIOGRAM   Ms. Gilma Edmonds has significant CAD.  The angiogram shows:  two vessel disease.  Due to patient's symptoms and severe CAD would highly recommend in-patient transfer for CABG evaluation.  Once patient's access site is stable, consider resuming iv heparin.      Discussed patient with Dr. Palacio (at [facility]).  He is accepting the patient.

## 2024-08-17 VITALS
BODY MASS INDEX: 22.08 KG/M2 | TEMPERATURE: 99 F | HEIGHT: 62 IN | WEIGHT: 120 LBS | SYSTOLIC BLOOD PRESSURE: 149 MMHG | OXYGEN SATURATION: 93 % | RESPIRATION RATE: 18 BRPM | HEART RATE: 64 BPM | DIASTOLIC BLOOD PRESSURE: 97 MMHG

## 2024-08-17 LAB
ALBUMIN SERPL-MCNC: 3.2 G/DL (ref 3.5–5)
ALBUMIN/GLOB SERPL: 1.3 RATIO (ref 1.1–2)
ALP SERPL-CCNC: 103 UNIT/L (ref 40–150)
ALT SERPL-CCNC: 30 UNIT/L (ref 0–55)
ANION GAP SERPL CALC-SCNC: 9 MEQ/L
APTT PPP: 130.1 SECONDS (ref 23.2–33.7)
APTT PPP: 50.2 SECONDS (ref 23.2–33.7)
APTT PPP: 50.4 SECONDS (ref 23.2–33.7)
AST SERPL-CCNC: 44 UNIT/L (ref 5–34)
BASOPHILS # BLD AUTO: 0.06 X10(3)/MCL
BASOPHILS NFR BLD AUTO: 0.6 %
BILIRUB SERPL-MCNC: 0.4 MG/DL
BUN SERPL-MCNC: 6.6 MG/DL (ref 9.8–20.1)
CALCIUM SERPL-MCNC: 8.6 MG/DL (ref 8.4–10.2)
CHLORIDE SERPL-SCNC: 108 MMOL/L (ref 98–107)
CO2 SERPL-SCNC: 22 MMOL/L (ref 22–29)
CREAT SERPL-MCNC: 0.6 MG/DL (ref 0.55–1.02)
CREAT/UREA NIT SERPL: 11
EOSINOPHIL # BLD AUTO: 0.31 X10(3)/MCL (ref 0–0.9)
EOSINOPHIL NFR BLD AUTO: 3 %
ERYTHROCYTE [DISTWIDTH] IN BLOOD BY AUTOMATED COUNT: 14.6 % (ref 11.5–17)
GFR SERPLBLD CREATININE-BSD FMLA CKD-EPI: >60 ML/MIN/1.73/M2
GLOBULIN SER-MCNC: 2.5 GM/DL (ref 2.4–3.5)
GLUCOSE SERPL-MCNC: 94 MG/DL (ref 74–100)
HCT VFR BLD AUTO: 36.1 % (ref 37–47)
HGB BLD-MCNC: 12.1 G/DL (ref 12–16)
IMM GRANULOCYTES # BLD AUTO: 0.04 X10(3)/MCL (ref 0–0.04)
IMM GRANULOCYTES NFR BLD AUTO: 0.4 %
INR PPP: 1
LYMPHOCYTES # BLD AUTO: 3.32 X10(3)/MCL (ref 0.6–4.6)
LYMPHOCYTES NFR BLD AUTO: 32.3 %
MCH RBC QN AUTO: 33.3 PG (ref 27–31)
MCHC RBC AUTO-ENTMCNC: 33.5 G/DL (ref 33–36)
MCV RBC AUTO: 99.4 FL (ref 80–94)
MONOCYTES # BLD AUTO: 0.62 X10(3)/MCL (ref 0.1–1.3)
MONOCYTES NFR BLD AUTO: 6 %
NEUTROPHILS # BLD AUTO: 5.92 X10(3)/MCL (ref 2.1–9.2)
NEUTROPHILS NFR BLD AUTO: 57.7 %
NRBC BLD AUTO-RTO: 0 %
PLATELET # BLD AUTO: 250 X10(3)/MCL (ref 130–400)
PMV BLD AUTO: 10.6 FL (ref 7.4–10.4)
POTASSIUM SERPL-SCNC: 4 MMOL/L (ref 3.5–5.1)
PROT SERPL-MCNC: 5.7 GM/DL (ref 6.4–8.3)
PROTHROMBIN TIME: 13 SECONDS (ref 12.5–14.5)
RBC # BLD AUTO: 3.63 X10(6)/MCL (ref 4.2–5.4)
SODIUM SERPL-SCNC: 139 MMOL/L (ref 136–145)
WBC # BLD AUTO: 10.27 X10(3)/MCL (ref 4.5–11.5)

## 2024-08-17 PROCEDURE — 80053 COMPREHEN METABOLIC PANEL: CPT | Performed by: THORACIC SURGERY (CARDIOTHORACIC VASCULAR SURGERY)

## 2024-08-17 PROCEDURE — 85025 COMPLETE CBC W/AUTO DIFF WBC: CPT | Performed by: PHYSICIAN ASSISTANT

## 2024-08-17 PROCEDURE — 25000003 PHARM REV CODE 250: Performed by: NURSE PRACTITIONER

## 2024-08-17 PROCEDURE — 63600175 PHARM REV CODE 636 W HCPCS: Performed by: PHYSICIAN ASSISTANT

## 2024-08-17 PROCEDURE — 25000003 PHARM REV CODE 250: Performed by: PHYSICIAN ASSISTANT

## 2024-08-17 PROCEDURE — 21400001 HC TELEMETRY ROOM

## 2024-08-17 PROCEDURE — 36415 COLL VENOUS BLD VENIPUNCTURE: CPT | Performed by: THORACIC SURGERY (CARDIOTHORACIC VASCULAR SURGERY)

## 2024-08-17 PROCEDURE — 85730 THROMBOPLASTIN TIME PARTIAL: CPT | Performed by: THORACIC SURGERY (CARDIOTHORACIC VASCULAR SURGERY)

## 2024-08-17 PROCEDURE — 85610 PROTHROMBIN TIME: CPT | Performed by: PHYSICIAN ASSISTANT

## 2024-08-17 RX ORDER — ASPIRIN 325 MG
650 TABLET ORAL ONCE
Status: DISCONTINUED | OUTPATIENT
Start: 2024-08-19 | End: 2024-08-17

## 2024-08-17 RX ORDER — HYDROCODONE BITARTRATE AND ACETAMINOPHEN 5; 325 MG/1; MG/1
1 TABLET ORAL EVERY 4 HOURS PRN
Status: DISCONTINUED | OUTPATIENT
Start: 2024-08-17 | End: 2024-08-20 | Stop reason: HOSPADM

## 2024-08-17 RX ORDER — ASPIRIN 325 MG
150 TABLET ORAL ONCE
Status: DISCONTINUED | OUTPATIENT
Start: 2024-08-19 | End: 2024-08-17

## 2024-08-17 RX ORDER — ASPIRIN 325 MG
30 TABLET ORAL ONCE
Status: DISCONTINUED | OUTPATIENT
Start: 2024-08-18 | End: 2024-08-17

## 2024-08-17 RX ORDER — ASPIRIN 325 MG
60 TABLET ORAL ONCE
Status: DISCONTINUED | OUTPATIENT
Start: 2024-08-18 | End: 2024-08-17

## 2024-08-17 RX ORDER — NAPROXEN SODIUM 220 MG/1
81 TABLET, FILM COATED ORAL DAILY
Status: DISCONTINUED | OUTPATIENT
Start: 2024-08-17 | End: 2024-08-20 | Stop reason: HOSPADM

## 2024-08-17 RX ORDER — ASPIRIN 325 MG
120 TABLET ORAL ONCE
Status: DISCONTINUED | OUTPATIENT
Start: 2024-08-18 | End: 2024-08-17

## 2024-08-17 RX ORDER — ASPIRIN 325 MG
325 TABLET ORAL ONCE
Status: DISCONTINUED | OUTPATIENT
Start: 2024-08-19 | End: 2024-08-17

## 2024-08-17 RX ORDER — GABAPENTIN 300 MG/1
300 CAPSULE ORAL 3 TIMES DAILY
Status: DISCONTINUED | OUTPATIENT
Start: 2024-08-17 | End: 2024-08-20 | Stop reason: HOSPADM

## 2024-08-17 RX ADMIN — HYDROCODONE BITARTRATE AND ACETAMINOPHEN 1 TABLET: 5; 325 TABLET ORAL at 06:08

## 2024-08-17 RX ADMIN — HYDROCODONE BITARTRATE AND ACETAMINOPHEN 1 TABLET: 5; 325 TABLET ORAL at 01:08

## 2024-08-17 RX ADMIN — ASPIRIN 81 MG CHEWABLE TABLET 81 MG: 81 TABLET CHEWABLE at 01:08

## 2024-08-17 RX ADMIN — HYDROCODONE BITARTRATE AND ACETAMINOPHEN 1 TABLET: 5; 325 TABLET ORAL at 11:08

## 2024-08-17 RX ADMIN — TICAGRELOR 90 MG: 90 TABLET ORAL at 08:08

## 2024-08-17 RX ADMIN — HEPARIN SODIUM 21 UNITS/KG/HR: 10000 INJECTION, SOLUTION INTRAVENOUS at 11:08

## 2024-08-17 RX ADMIN — NITROGLYCERIN 1 INCH: 20 OINTMENT TOPICAL at 01:08

## 2024-08-17 RX ADMIN — NITROGLYCERIN 1 INCH: 20 OINTMENT TOPICAL at 06:08

## 2024-08-17 RX ADMIN — NITROGLYCERIN 1 INCH: 20 OINTMENT TOPICAL at 05:08

## 2024-08-17 RX ADMIN — TICAGRELOR 90 MG: 90 TABLET ORAL at 01:08

## 2024-08-17 RX ADMIN — HYDROCODONE BITARTRATE AND ACETAMINOPHEN 1 TABLET: 5; 325 TABLET ORAL at 05:08

## 2024-08-17 RX ADMIN — GABAPENTIN 300 MG: 300 CAPSULE ORAL at 08:08

## 2024-08-17 RX ADMIN — NITROGLYCERIN 1 INCH: 20 OINTMENT TOPICAL at 11:08

## 2024-08-17 NOTE — H&P
[facility]                       [address] [place] [id number]    PATIENT NAME:       JENNY EDMONDS   YOB: 1970  CSN:                [id number]   MRN:                [id number]  ADMIT DATE:         08/16/2024 21:35:00  PHYSICIAN:          Elham Palacio MD                        HISTORY AND PHYSICAL      HISTORY OF PRESENT ILLNESS:  Mrs. Edmonds is a 54-year-old female admitted to [facility]   with chest pain.  Workup with Cardiology revealed evidence of right coronary   artery disease with high diagonal disease.  She was transferred to [facility] for further evaluation and treatment.    PAST MEDICAL HISTORY:  Positive for hepatic steatosis, rheumatoid arthritis,   pelvic fracture, compression fracture of L4, lumbar degenerative disk disease.    MEDICATIONS:  Per MAR.    ALLERGIES:  MELOXICAM, SULFA DRUGS.     SOCIAL HISTORY:  She is a smoker.    REVIEW OF SYSTEMS:  Essentially has no chest pain now.  She has a history of chest pain on and off   that has responded very well to nitroglycerin.  All other systems have been   reviewed and negative.    PHYSICAL EXAMINATION:  GENERAL:  She is in no acute distress.  VITAL SIGNS:  Stable.  NECK:  Trachea midline.  No carotid bruits.  EYES:  EOMI, PERRLA.  LUNGS:  Clear and resonant bilaterally.  HEART:  Normal S1, S2.  No murmurs.    ABDOMEN:  Soft.   EXTREMITIES:  Warm.  AFFECT:  Appropriate.   NEUROLOGIC:  2+ motor power.  MUSCULOSKELETAL:  No gross deformity.    ASSESSMENT AND PLAN:  A 54-year-old female, smoker with coronary artery disease.    I reviewed her angiogram and she does have severe stenosis on the right   coronary artery and stenosis on the high diagonal/ramus vessel.  At this time, I   believe the patient might benefit from stenting as there is no clear-cut   indication for coronary artery bypass graft.  If this is technically not   feasible, the  patient will be a candidate for coronary artery bypass grafting.    I did discuss the case with Dr. Low.  He reviewed the films and elected to   proceed.        ______________________________  MD MICA Jin/ALIZE  DD:  08/17/2024  Time:  11:37AM  DT:  08/17/2024  Time:  11:58AM  Job #:  [id number]      HISTORY AND PHYSICAL

## 2024-08-17 NOTE — NURSING
Nurses Note -- 4 Eyes      8/17/2024   8:00 AM      Skin assessed during: Daily Assessment      [x] No Altered Skin Integrity Present    [x]Prevention Measures Documented      [] Yes- Altered Skin Integrity Present or Discovered   [] LDA Added if Not in Epic (Describe Wound)   [] New Altered Skin Integrity was Present on Admit and Documented in LDA   [] Wound Image Taken    Wound Care Consulted? No    Attending Nurse:  Jatin Alexander RN/Staff Member:   Tyree

## 2024-08-17 NOTE — NURSING
Report given to MARYANNE Baron for patient to be transferred to room 984. [facility] with NS @100 and Heparin @ 18u/kg/hr.

## 2024-08-17 NOTE — NURSING
Nurses Note -- 4 Eyes      8/16/2024   10:05 PM      Skin assessed during: Admit      [x] No Altered Skin Integrity Present    [x]Prevention Measures Documented      [] Yes- Altered Skin Integrity Present or Discovered   [] LDA Added if Not in Epic (Describe Wound)   [] New Altered Skin Integrity was Present on Admit and Documented in LDA   [] Wound Image Taken    Wound Care Consulted? No    Attending Nurse:  Tod MADDEN    Second RN/Staff Member:   Meghan

## 2024-08-18 LAB
APTT PPP: 60.9 SECONDS (ref 23.2–33.7)
APTT PPP: 73.4 SECONDS (ref 23.2–33.7)
APTT PPP: 73.5 SECONDS (ref 23.2–33.7)
APTT PPP: 77.6 SECONDS (ref 23.2–33.7)
BASOPHILS # BLD AUTO: 0.05 X10(3)/MCL
BASOPHILS NFR BLD AUTO: 0.4 %
EOSINOPHIL # BLD AUTO: 0.33 X10(3)/MCL (ref 0–0.9)
EOSINOPHIL NFR BLD AUTO: 2.7 %
ERYTHROCYTE [DISTWIDTH] IN BLOOD BY AUTOMATED COUNT: 14.4 % (ref 11.5–17)
HCT VFR BLD AUTO: 37 % (ref 37–47)
HGB BLD-MCNC: 12.6 G/DL (ref 12–16)
IMM GRANULOCYTES # BLD AUTO: 0.1 X10(3)/MCL (ref 0–0.04)
IMM GRANULOCYTES NFR BLD AUTO: 0.8 %
LYMPHOCYTES # BLD AUTO: 1.82 X10(3)/MCL (ref 0.6–4.6)
LYMPHOCYTES NFR BLD AUTO: 14.7 %
MCH RBC QN AUTO: 33.1 PG (ref 27–31)
MCHC RBC AUTO-ENTMCNC: 34.1 G/DL (ref 33–36)
MCV RBC AUTO: 97.1 FL (ref 80–94)
MONOCYTES # BLD AUTO: 1.23 X10(3)/MCL (ref 0.1–1.3)
MONOCYTES NFR BLD AUTO: 9.9 %
NEUTROPHILS # BLD AUTO: 8.86 X10(3)/MCL (ref 2.1–9.2)
NEUTROPHILS NFR BLD AUTO: 71.5 %
NRBC BLD AUTO-RTO: 0 %
PLATELET # BLD AUTO: 260 X10(3)/MCL (ref 130–400)
PMV BLD AUTO: 10.1 FL (ref 7.4–10.4)
RBC # BLD AUTO: 3.81 X10(6)/MCL (ref 4.2–5.4)
WBC # BLD AUTO: 12.39 X10(3)/MCL (ref 4.5–11.5)

## 2024-08-18 PROCEDURE — 85730 THROMBOPLASTIN TIME PARTIAL: CPT | Performed by: THORACIC SURGERY (CARDIOTHORACIC VASCULAR SURGERY)

## 2024-08-18 PROCEDURE — 36415 COLL VENOUS BLD VENIPUNCTURE: CPT | Performed by: THORACIC SURGERY (CARDIOTHORACIC VASCULAR SURGERY)

## 2024-08-18 PROCEDURE — 25000003 PHARM REV CODE 250: Performed by: PHYSICIAN ASSISTANT

## 2024-08-18 PROCEDURE — 21400001 HC TELEMETRY ROOM

## 2024-08-18 PROCEDURE — 63600175 PHARM REV CODE 636 W HCPCS: Performed by: PHYSICIAN ASSISTANT

## 2024-08-18 PROCEDURE — 85025 COMPLETE CBC W/AUTO DIFF WBC: CPT | Performed by: PHYSICIAN ASSISTANT

## 2024-08-18 PROCEDURE — 85730 THROMBOPLASTIN TIME PARTIAL: CPT | Performed by: PHYSICIAN ASSISTANT

## 2024-08-18 PROCEDURE — 25000003 PHARM REV CODE 250: Performed by: NURSE PRACTITIONER

## 2024-08-18 RX ORDER — CARVEDILOL 3.12 MG/1
3.12 TABLET ORAL 2 TIMES DAILY
Status: DISCONTINUED | OUTPATIENT
Start: 2024-08-18 | End: 2024-08-20 | Stop reason: HOSPADM

## 2024-08-18 RX ORDER — ALUMINUM HYDROXIDE, MAGNESIUM HYDROXIDE, AND SIMETHICONE 1200; 120; 1200 MG/30ML; MG/30ML; MG/30ML
30 SUSPENSION ORAL EVERY 4 HOURS PRN
Status: DISCONTINUED | OUTPATIENT
Start: 2024-08-18 | End: 2024-08-20 | Stop reason: HOSPADM

## 2024-08-18 RX ORDER — VALSARTAN 80 MG/1
80 TABLET ORAL DAILY
Status: DISCONTINUED | OUTPATIENT
Start: 2024-08-18 | End: 2024-08-20 | Stop reason: HOSPADM

## 2024-08-18 RX ORDER — ONDANSETRON HYDROCHLORIDE 2 MG/ML
4 INJECTION, SOLUTION INTRAVENOUS EVERY 6 HOURS PRN
Status: DISCONTINUED | OUTPATIENT
Start: 2024-08-18 | End: 2024-08-20 | Stop reason: HOSPADM

## 2024-08-18 RX ADMIN — ASPIRIN 81 MG CHEWABLE TABLET 81 MG: 81 TABLET CHEWABLE at 09:08

## 2024-08-18 RX ADMIN — TICAGRELOR 90 MG: 90 TABLET ORAL at 09:08

## 2024-08-18 RX ADMIN — VALSARTAN 80 MG: 80 TABLET, FILM COATED ORAL at 09:08

## 2024-08-18 RX ADMIN — GABAPENTIN 300 MG: 300 CAPSULE ORAL at 09:08

## 2024-08-18 RX ADMIN — HEPARIN SODIUM 23 UNITS/KG/HR: 10000 INJECTION, SOLUTION INTRAVENOUS at 09:08

## 2024-08-18 RX ADMIN — NITROGLYCERIN 1 INCH: 20 OINTMENT TOPICAL at 12:08

## 2024-08-18 RX ADMIN — HYDROCODONE BITARTRATE AND ACETAMINOPHEN 1 TABLET: 5; 325 TABLET ORAL at 05:08

## 2024-08-18 RX ADMIN — HYDROCODONE BITARTRATE AND ACETAMINOPHEN 1 TABLET: 5; 325 TABLET ORAL at 09:08

## 2024-08-18 RX ADMIN — CARVEDILOL 3.12 MG: 3.12 TABLET, FILM COATED ORAL at 09:08

## 2024-08-18 RX ADMIN — HYDROCODONE BITARTRATE AND ACETAMINOPHEN 1 TABLET: 5; 325 TABLET ORAL at 12:08

## 2024-08-18 RX ADMIN — GABAPENTIN 300 MG: 300 CAPSULE ORAL at 02:08

## 2024-08-18 RX ADMIN — NITROGLYCERIN 1 INCH: 20 OINTMENT TOPICAL at 06:08

## 2024-08-18 NOTE — CONSULTS
Inpatient consult to Cardiology  Consult performed by: Geronimo Mims ANP  Consult ordered by: Geronimo Mims ANP  Reason for consult: High Risk PCI/MVCAD        [facility]    Cardiology  Consult Note    Patient Name: Gilma Edmonds  MRN: [id number]  Admission Date: 2024  Hospital Length of Stay: 2 days  Code Status: Prior   Attending Provider: Elham Palacio MD   Consulting Provider: GEORGINA Smiley  Primary Care Physician: Emma Rea NP  Principal Problem:<principal problem not specified>    Patient information was obtained from patient, past medical records, and ER records.     Subjective:     Chief Complaint/Reason for Consult: MVCAD/Request for High Risk PCI    HPI: Ms. Edmonds is a 53 y/o female who is known to Dr. Salvador MEEKS. The patient presented to [facility] on 24 via transfer from [facility]. She initially presented to [facility] with c/o CP and was found to have an Elevated Troponin. She was evaluated by Cardiology and she underwent a C with results of MVCAD. She was transferred to [facility] for a CABG Evaluation and CV Surgery recommended High Risk PCI, therefore, [facility] was consulted for this reason.     PMH: OA, MVCAD, Tobacco/ETOH Use  PSH: L Ankle Hardware Removal, , L Ankle Surgery, Hand and Wrist Surgery   Family History: Father, L, RA; Mother L, Lung Cancer  Social History: Denies Illicit Drug,+ ETOH Use Daily, and + Tobacco Use 1 ppd for 30+ Years    Previous Cardiac Diagnostics:   [facility] 24:  The Prox RCA lesion was 90% stenosed.  The Mid RCA-1 lesion was 70% stenosed.  The Mid RCA-2 lesion was 80% stenosed.  The 1st Diag lesion was 70% stenosed.  The pre-procedure left ventricular end diastolic pressure was 12.  The estimated blood loss was <50 mL.  There was two vessel coronary artery disease.  FINDINGS  LVEDP:  12 mmHg  Left Main:  No stenosis  LAD:  nonobstructive disease   Dx1:  70% ostial/proximal stenosis   Circumflex:  No stenosis  RCA:  mid stenosis (3 tandem  lesions)   RECOMMENDATIONS  Refer for CABG evaluation  Risk factor modifications   Activity -- avoid straining with affected limb for one week  Exercise -- as tolerated  Precautions post D/C -- come to ER for hematoma, unusual pain, erythema, or unusual drainage at access site  Precautions post D/C -- if develop bleeding or hematoma at access site, hold pressure at access site, and come to ER    ECHO 8.14.24:  Left Ventricle: The left ventricle is normal in size. Normal wall thickness. There is normal systolic function. Biplane (2D) method of discs ejection fraction is 63%.  Right Ventricle: Mild right ventricular enlargement. Systolic function is normal.  Mitral Valve: There is mild to moderate regurgitation.  Pulmonary Artery: The estimated pulmonary artery systolic pressure is 27 mmHg.  IVC/SVC: Intermediate venous pressure at 8 mmHg.    Review of patient's allergies indicates:   Allergen Reactions    Meloxicam Swelling    Sulfa (sulfonamide antibiotics) Hives     On tongue    Sulfamethoxazole-trimethoprim Hives     On tongue     Current Facility-Administered Medications on File Prior to Encounter   Medication    lactated ringers infusion     Current Outpatient Medications on File Prior to Encounter   Medication Sig    gabapentin (NEURONTIN) 300 MG capsule Take 1 capsule (300 mg total) by mouth 3 (three) times daily.    pantoprazole (PROTONIX) 40 MG tablet Take 40 mg by mouth once daily.    aspirin 81 MG Chew Take 1 tablet (81 mg total) by mouth 2 (two) times a day.    gabapentin (NEURONTIN) 300 MG capsule Take 1 capsule (300 mg total) by mouth 3 (three) times daily.    methocarbamoL (ROBAXIN) 750 MG Tab Take 750 mg by mouth 4 (four) times daily. Not taking     Review of Systems   Constitutional:  Positive for fatigue.   Respiratory:  Negative for shortness of breath.    Cardiovascular:  Negative for chest pain, palpitations and leg swelling.   All other systems reviewed and are negative.    Objective:     Vital  Signs (Most Recent):  Temp: 98.4 °F (36.9 °C) (08/18/24 0356)  Pulse: 84 (08/18/24 0356)  Resp: 20 (08/18/24 0505)  BP: (!) 142/95 (08/18/24 0356)  SpO2: 96 % (08/18/24 0356) Vital Signs (24h Range):  Temp:  [97.7 °F (36.5 °C)-98.7 °F (37.1 °C)] 98.4 °F (36.9 °C)  Pulse:  [62-85] 84  Resp:  [14-22] 20  SpO2:  [93 %-97 %] 96 %  BP: (142-170)/(95-99) 142/95   Weight: 58.2 kg (128 lb 4.9 oz)  Body mass index is 23.47 kg/m².  SpO2: 96 %       Intake/Output Summary (Last 24 hours) at 8/18/2024 0634  Last data filed at 8/17/2024 1318  Gross per 24 hour   Intake 720 ml   Output --   Net 720 ml     Lines/Drains/Airways       Peripheral Intravenous Line  Duration                  Peripheral IV - Single Lumen 08/14/24 0824 20 G Anterior;Right Forearm 3 days                  Significant Labs:   Chemistries:   Recent Labs   Lab 08/14/24  0825 08/14/24  1104 08/14/24  1727 08/14/24  2012 08/15/24  0219 08/15/24  0809 08/15/24  1442 08/16/24  0703 08/17/24  0511     --   --   --  140  --   --  141 139   K 3.2*  --   --   --  3.8  --   --  3.7 4.0     --   --   --  108*  --   --  110* 108*   CO2 19*  --   --   --  23  --   --  23 22   BUN 13.1  --   --   --  8.8*  --   --  10.9 6.6*   CREATININE 0.57  --   --   --  0.60  --   --  0.59 0.60   CALCIUM 9.3  --   --   --  8.8  --   --  8.6 8.6   BILITOT 0.6  --   --   --  0.8  --   --  0.3 0.4   ALKPHOS 121  --   --   --  98  --   --  91 103   ALT 19  --   --   --  14  --   --  23 30   AST 31  --   --   --  28  --   --  42* 44*   GLUCOSE 114*  --   --   --  86  --   --  105* 94   MG 1.90  --   --   --  2.00  --   --  1.70  --    PHOS  --   --   --   --  2.9  --   --  3.8  --    TROPONINI 0.819*   < > 2.489* 2.690* 2.043* 1.615* 1.237*  --   --     < > = values in this interval not displayed.        CBC/Anemia Labs: Coags:    Recent Labs   Lab 08/14/24  2012 08/15/24  0219 08/17/24  0511   WBC 9.78 9.15 10.27   HGB 14.1 13.5 12.1   HCT 40.7 39.3 36.1*    293 250    MCV 96.9* 96.1* 99.4*   RDW 14.5 14.4 14.6    Recent Labs   Lab 08/14/24  0932 08/14/24  2012 08/15/24  0219 08/15/24  1653 08/15/24  2248 08/16/24  0703 08/17/24  0511   INR 1.0 1.0  --   --   --   --  1.0   APTT  --  37.1*   < > 51.3* 52.8* 55.7*  --     < > = values in this interval not displayed.        EKG:       Telemetry: SR    Physical Exam  Constitutional:       Appearance: Normal appearance.   HENT:      Head: Normocephalic.      Mouth/Throat:      Mouth: Mucous membranes are moist.   Eyes:      Extraocular Movements: Extraocular movements intact.      Conjunctiva/sclera: Conjunctivae normal.   Cardiovascular:      Rate and Rhythm: Normal rate and regular rhythm.      Pulses: Normal pulses.   Pulmonary:      Effort: Pulmonary effort is normal. No respiratory distress.      Breath sounds: Normal breath sounds.      Comments: RA  Abdominal:      Palpations: Abdomen is soft.   Musculoskeletal:         General: No swelling. Normal range of motion.   Skin:     General: Skin is warm and dry.      Comments: R Groin Soft/Flat, Non-Tender, No Sign of Bleed/Infection. +2 BLE Palpable Pedal Pulses    Neurological:      General: No focal deficit present.      Mental Status: She is alert and oriented to person, place, and time.   Psychiatric:         Mood and Affect: Mood normal.         Behavior: Behavior normal.       Home Medications:   Current Facility-Administered Medications on File Prior to Encounter   Medication Dose Route Frequency Provider Last Rate Last Admin    lactated ringers infusion   Intravenous Continuous Evette Quintero MD 10 mL/hr at 04/30/24 1046 Restarted at 04/30/24 1236     Current Outpatient Medications on File Prior to Encounter   Medication Sig Dispense Refill    gabapentin (NEURONTIN) 300 MG capsule Take 1 capsule (300 mg total) by mouth 3 (three) times daily. 90 capsule 0    pantoprazole (PROTONIX) 40 MG tablet Take 40 mg by mouth once daily.      aspirin 81 MG Chew Take 1 tablet (81  mg total) by mouth 2 (two) times a day. 84 tablet 0    gabapentin (NEURONTIN) 300 MG capsule Take 1 capsule (300 mg total) by mouth 3 (three) times daily. 90 capsule 11    methocarbamoL (ROBAXIN) 750 MG Tab Take 750 mg by mouth 4 (four) times daily. Not taking       Current Schedule Inpatient Medications:   aspirin  81 mg Oral Daily    gabapentin  300 mg Oral TID    nitroGLYCERIN 2% TD oint  1 inch Transdermal Q6H    ticagrelor  90 mg Oral BID     Continuous Infusions:   heparin (porcine) in D5W  0-40 Units/kg/hr (Adjusted) Intravenous Continuous 11.3 mL/hr at 08/17/24 2345 21 Units/kg/hr at 08/17/24 2345     I,Don Snow MD,performed the substantive portion of this visit. I had a face-to-face time with the patient on 8/18/24. I reviewed and agree with the nurse practitioner's history, physical exam.     Medical decision making:        Assessment:   NSTEMI Type I    - Troponin Peak (2.69)  MVCAD    - LHC (8.16.24) - Left Main: No stenosis, LAD: Nonobstructive Disease, DX1: 70% Ostial/Proximal Stenosis, Circumflex: No Stenosis, RCA: Mid Stenosis (3 Tandem Lesions)   Tobacco Use  ETOH Use  No Hx of GIB     Plan:   Continue ASA and Brilinta  Start Coreg 3.125mg PO BID   Start Valsartan 80mg PO Qday  Keep NPO after MN  Schedule/Consent for LHC with PCI to RCA/Diag  Risk, Benefits and Alternatives Reviewed and Discussed with the PT and their Family and they wish to proceed with above Procedure.   Labs and EKG in AM: CBC, CMP and Mg     Thank you for your consult.     Geronimo Mims, GEORGINA  Cardiology  [facility]  08/18/2024

## 2024-08-18 NOTE — NURSING
Nurses Note -- 4 Eyes      8/18/2024   8:00 AM      Skin assessed during: Daily Assessment      [x] No Altered Skin Integrity Present    [x]Prevention Measures Documented      [] Yes- Altered Skin Integrity Present or Discovered   [] LDA Added if Not in Epic (Describe Wound)   [] New Altered Skin Integrity was Present on Admit and Documented in LDA   [] Wound Image Taken    Wound Care Consulted? No    Attending Nurse:  Jatin Alexander RN/Staff Member:   MILAGRO Davey

## 2024-08-18 NOTE — PROGRESS NOTES
"Gilma Edmonds is a 54 y.o. female patient.   1. CVD (cardiovascular disease)      Past Medical History:   Diagnosis Date    Arthritis      No past surgical history pertinent negatives on file.  Scheduled Meds:   aspirin  81 mg Oral Daily    carvediloL  3.125 mg Oral BID    gabapentin  300 mg Oral TID    nitroGLYCERIN 2% TD oint  1 inch Transdermal Q6H    ticagrelor  90 mg Oral BID    valsartan  80 mg Oral Daily     Continuous Infusions:   heparin (porcine) in D5W  0-40 Units/kg/hr (Adjusted) Intravenous Continuous 11.3 mL/hr at 08/17/24 2345 21 Units/kg/hr at 08/17/24 2345     PRN Meds:  Current Facility-Administered Medications:     heparin (PORCINE), 60 Units/kg (Adjusted), Intravenous, PRN    heparin (PORCINE), 30 Units/kg (Adjusted), Intravenous, PRN    HYDROcodone-acetaminophen, 1 tablet, Oral, Q4H PRN    ondansetron, 4 mg, Intravenous, Q6H PRN    Review of patient's allergies indicates:   Allergen Reactions    Meloxicam Swelling    Sulfa (sulfonamide antibiotics) Hives     On tongue    Sulfamethoxazole-trimethoprim Hives     On tongue     There are no hospital problems to display for this patient.    Blood pressure 129/76, pulse 67, temperature 98.1 °F (36.7 °C), temperature source Oral, resp. rate 16, height 5' 2" (1.575 m), weight 58.2 kg (128 lb 4.9 oz), SpO2 96%, not currently breastfeeding.    Subjective:    Mrs. Edmonds is a 54-year-old female admitted to [facility]   with chest pain.  Workup with Cardiology revealed evidence of right coronary   artery disease with high diagonal disease.  She was transferred to [facility] for further evaluation and treatment. Dr. Palacio discussed case with [facility]. They are planning to stent RCA and diagonal.  Remains on heparin gtt. Denies chest pain.     Objective:   GENERAL:  She is in no acute distress.  VITAL SIGNS:  Stable.  NECK:  Trachea midline.  No carotid bruits.  EYES:  EOMI, PERRLA.  LUNGS:  Clear and resonant bilaterally.  HEART:  Normal S1, S2.  No murmurs.  "   ABDOMEN:  Soft.   EXTREMITIES:  Warm.  AFFECT:  Appropriate.   NEUROLOGIC:  2+ motor power.  MUSCULOSKELETAL:  No gross deformity.      Assesment/Plan:    CAD, 2 vessel disease  [facility] planning [facility] for stenting  Will follow    GEM Chong  8/18/2024

## 2024-08-19 LAB
ALBUMIN SERPL-MCNC: 3.2 G/DL (ref 3.5–5)
ALBUMIN/GLOB SERPL: 1.1 RATIO (ref 1.1–2)
ALP SERPL-CCNC: 114 UNIT/L (ref 40–150)
ALT SERPL-CCNC: 36 UNIT/L (ref 0–55)
ANION GAP SERPL CALC-SCNC: 10 MEQ/L
APTT PPP: 82.7 SECONDS (ref 23.2–33.7)
AST SERPL-CCNC: 30 UNIT/L (ref 5–34)
BASOPHILS # BLD AUTO: 0.09 X10(3)/MCL
BASOPHILS NFR BLD AUTO: 0.8 %
BILIRUB SERPL-MCNC: 0.4 MG/DL
BUN SERPL-MCNC: 7.4 MG/DL (ref 9.8–20.1)
CALCIUM SERPL-MCNC: 8.6 MG/DL (ref 8.4–10.2)
CHLORIDE SERPL-SCNC: 105 MMOL/L (ref 98–107)
CO2 SERPL-SCNC: 21 MMOL/L (ref 22–29)
CREAT SERPL-MCNC: 0.69 MG/DL (ref 0.55–1.02)
CREAT/UREA NIT SERPL: 11
EOSINOPHIL # BLD AUTO: 0.38 X10(3)/MCL (ref 0–0.9)
EOSINOPHIL NFR BLD AUTO: 3.4 %
ERYTHROCYTE [DISTWIDTH] IN BLOOD BY AUTOMATED COUNT: 14.7 % (ref 11.5–17)
GFR SERPLBLD CREATININE-BSD FMLA CKD-EPI: >60 ML/MIN/1.73/M2
GLOBULIN SER-MCNC: 2.8 GM/DL (ref 2.4–3.5)
GLUCOSE SERPL-MCNC: 106 MG/DL (ref 74–100)
HCT VFR BLD AUTO: 36.2 % (ref 37–47)
HGB BLD-MCNC: 12.3 G/DL (ref 12–16)
IMM GRANULOCYTES # BLD AUTO: 0.1 X10(3)/MCL (ref 0–0.04)
IMM GRANULOCYTES NFR BLD AUTO: 0.9 %
LYMPHOCYTES # BLD AUTO: 2.78 X10(3)/MCL (ref 0.6–4.6)
LYMPHOCYTES NFR BLD AUTO: 24.9 %
MAGNESIUM SERPL-MCNC: 2 MG/DL (ref 1.6–2.6)
MCH RBC QN AUTO: 33.3 PG (ref 27–31)
MCHC RBC AUTO-ENTMCNC: 34 G/DL (ref 33–36)
MCV RBC AUTO: 98.1 FL (ref 80–94)
MONOCYTES # BLD AUTO: 1.24 X10(3)/MCL (ref 0.1–1.3)
MONOCYTES NFR BLD AUTO: 11.1 %
NEUTROPHILS # BLD AUTO: 6.56 X10(3)/MCL (ref 2.1–9.2)
NEUTROPHILS NFR BLD AUTO: 58.9 %
NRBC BLD AUTO-RTO: 0 %
OHS QRS DURATION: 72 MS
OHS QRS DURATION: 72 MS
OHS QTC CALCULATION: 441 MS
OHS QTC CALCULATION: 452 MS
PLATELET # BLD AUTO: 277 X10(3)/MCL (ref 130–400)
PMV BLD AUTO: 10.4 FL (ref 7.4–10.4)
POTASSIUM SERPL-SCNC: 4.1 MMOL/L (ref 3.5–5.1)
PROT SERPL-MCNC: 6 GM/DL (ref 6.4–8.3)
RBC # BLD AUTO: 3.69 X10(6)/MCL (ref 4.2–5.4)
SODIUM SERPL-SCNC: 136 MMOL/L (ref 136–145)
WBC # BLD AUTO: 11.15 X10(3)/MCL (ref 4.5–11.5)

## 2024-08-19 PROCEDURE — 63600175 PHARM REV CODE 636 W HCPCS: Performed by: STUDENT IN AN ORGANIZED HEALTH CARE EDUCATION/TRAINING PROGRAM

## 2024-08-19 PROCEDURE — C1753 CATH, INTRAVAS ULTRASOUND: HCPCS | Performed by: STUDENT IN AN ORGANIZED HEALTH CARE EDUCATION/TRAINING PROGRAM

## 2024-08-19 PROCEDURE — C1760 CLOSURE DEV, VASC: HCPCS | Performed by: STUDENT IN AN ORGANIZED HEALTH CARE EDUCATION/TRAINING PROGRAM

## 2024-08-19 PROCEDURE — C9600 PERC DRUG-EL COR STENT SING: HCPCS | Mod: RC | Performed by: STUDENT IN AN ORGANIZED HEALTH CARE EDUCATION/TRAINING PROGRAM

## 2024-08-19 PROCEDURE — 85025 COMPLETE CBC W/AUTO DIFF WBC: CPT | Performed by: PHYSICIAN ASSISTANT

## 2024-08-19 PROCEDURE — 25500020 PHARM REV CODE 255: Performed by: STUDENT IN AN ORGANIZED HEALTH CARE EDUCATION/TRAINING PROGRAM

## 2024-08-19 PROCEDURE — 92978 ENDOLUMINL IVUS OCT C 1ST: CPT | Mod: RC | Performed by: STUDENT IN AN ORGANIZED HEALTH CARE EDUCATION/TRAINING PROGRAM

## 2024-08-19 PROCEDURE — 36415 COLL VENOUS BLD VENIPUNCTURE: CPT | Performed by: NURSE PRACTITIONER

## 2024-08-19 PROCEDURE — 93005 ELECTROCARDIOGRAM TRACING: CPT

## 2024-08-19 PROCEDURE — 99153 MOD SED SAME PHYS/QHP EA: CPT | Performed by: STUDENT IN AN ORGANIZED HEALTH CARE EDUCATION/TRAINING PROGRAM

## 2024-08-19 PROCEDURE — 93454 CORONARY ARTERY ANGIO S&I: CPT | Mod: XU | Performed by: STUDENT IN AN ORGANIZED HEALTH CARE EDUCATION/TRAINING PROGRAM

## 2024-08-19 PROCEDURE — 85730 THROMBOPLASTIN TIME PARTIAL: CPT | Performed by: PHYSICIAN ASSISTANT

## 2024-08-19 PROCEDURE — 25000003 PHARM REV CODE 250: Performed by: NURSE PRACTITIONER

## 2024-08-19 PROCEDURE — 21400001 HC TELEMETRY ROOM

## 2024-08-19 PROCEDURE — C1894 INTRO/SHEATH, NON-LASER: HCPCS | Performed by: STUDENT IN AN ORGANIZED HEALTH CARE EDUCATION/TRAINING PROGRAM

## 2024-08-19 PROCEDURE — C1874 STENT, COATED/COV W/DEL SYS: HCPCS | Performed by: STUDENT IN AN ORGANIZED HEALTH CARE EDUCATION/TRAINING PROGRAM

## 2024-08-19 PROCEDURE — B2111ZZ FLUOROSCOPY OF MULTIPLE CORONARY ARTERIES USING LOW OSMOLAR CONTRAST: ICD-10-PCS | Performed by: INTERNAL MEDICINE

## 2024-08-19 PROCEDURE — 27201423 OPTIME MED/SURG SUP & DEVICES STERILE SUPPLY: Performed by: STUDENT IN AN ORGANIZED HEALTH CARE EDUCATION/TRAINING PROGRAM

## 2024-08-19 PROCEDURE — C1887 CATHETER, GUIDING: HCPCS | Performed by: STUDENT IN AN ORGANIZED HEALTH CARE EDUCATION/TRAINING PROGRAM

## 2024-08-19 PROCEDURE — 99152 MOD SED SAME PHYS/QHP 5/>YRS: CPT | Performed by: STUDENT IN AN ORGANIZED HEALTH CARE EDUCATION/TRAINING PROGRAM

## 2024-08-19 PROCEDURE — C1725 CATH, TRANSLUMIN NON-LASER: HCPCS | Performed by: STUDENT IN AN ORGANIZED HEALTH CARE EDUCATION/TRAINING PROGRAM

## 2024-08-19 PROCEDURE — 25000003 PHARM REV CODE 250: Performed by: PHYSICIAN ASSISTANT

## 2024-08-19 PROCEDURE — 83735 ASSAY OF MAGNESIUM: CPT | Performed by: NURSE PRACTITIONER

## 2024-08-19 PROCEDURE — 4A023N7 MEASUREMENT OF CARDIAC SAMPLING AND PRESSURE, LEFT HEART, PERCUTANEOUS APPROACH: ICD-10-PCS | Performed by: INTERNAL MEDICINE

## 2024-08-19 PROCEDURE — 93010 ELECTROCARDIOGRAM REPORT: CPT | Mod: 59,,, | Performed by: STUDENT IN AN ORGANIZED HEALTH CARE EDUCATION/TRAINING PROGRAM

## 2024-08-19 PROCEDURE — 27000221 HC OXYGEN, UP TO 24 HOURS

## 2024-08-19 PROCEDURE — 80053 COMPREHEN METABOLIC PANEL: CPT | Performed by: NURSE PRACTITIONER

## 2024-08-19 PROCEDURE — 93010 ELECTROCARDIOGRAM REPORT: CPT | Mod: ,,, | Performed by: STUDENT IN AN ORGANIZED HEALTH CARE EDUCATION/TRAINING PROGRAM

## 2024-08-19 PROCEDURE — C1769 GUIDE WIRE: HCPCS | Performed by: STUDENT IN AN ORGANIZED HEALTH CARE EDUCATION/TRAINING PROGRAM

## 2024-08-19 DEVICE — EVEROLIMUS-ELUTING PLATINUM CHROMIUM CORONARY STENT SYSTEM
Type: IMPLANTABLE DEVICE | Site: CHEST | Status: FUNCTIONAL
Brand: SYNERGY™ XD

## 2024-08-19 DEVICE — KIT ANGIO SEAL STS PLUS 6FR: Type: IMPLANTABLE DEVICE | Site: GROIN | Status: FUNCTIONAL

## 2024-08-19 RX ORDER — ACETAMINOPHEN 325 MG/1
650 TABLET ORAL EVERY 4 HOURS PRN
Status: DISCONTINUED | OUTPATIENT
Start: 2024-08-19 | End: 2024-08-20 | Stop reason: HOSPADM

## 2024-08-19 RX ORDER — HYDRALAZINE HYDROCHLORIDE 20 MG/ML
10 INJECTION INTRAMUSCULAR; INTRAVENOUS EVERY 4 HOURS PRN
Status: DISCONTINUED | OUTPATIENT
Start: 2024-08-19 | End: 2024-08-20 | Stop reason: HOSPADM

## 2024-08-19 RX ORDER — MIDAZOLAM HYDROCHLORIDE 1 MG/ML
INJECTION INTRAMUSCULAR; INTRAVENOUS
Status: DISCONTINUED | OUTPATIENT
Start: 2024-08-19 | End: 2024-08-19 | Stop reason: HOSPADM

## 2024-08-19 RX ORDER — FENTANYL CITRATE 50 UG/ML
INJECTION, SOLUTION INTRAMUSCULAR; INTRAVENOUS
Status: DISCONTINUED | OUTPATIENT
Start: 2024-08-19 | End: 2024-08-19 | Stop reason: HOSPADM

## 2024-08-19 RX ORDER — ATORVASTATIN CALCIUM 40 MG/1
40 TABLET, FILM COATED ORAL DAILY
Status: DISCONTINUED | OUTPATIENT
Start: 2024-08-19 | End: 2024-08-20 | Stop reason: HOSPADM

## 2024-08-19 RX ORDER — HEPARIN SODIUM 1000 [USP'U]/ML
INJECTION, SOLUTION INTRAVENOUS; SUBCUTANEOUS
Status: DISCONTINUED | OUTPATIENT
Start: 2024-08-19 | End: 2024-08-19 | Stop reason: HOSPADM

## 2024-08-19 RX ORDER — ROSUVASTATIN CALCIUM 40 MG/1
40 TABLET, COATED ORAL NIGHTLY
Qty: 90 TABLET | Refills: 3 | Status: SHIPPED | OUTPATIENT
Start: 2024-08-19 | End: 2025-08-19

## 2024-08-19 RX ORDER — CEFAZOLIN SODIUM 1 G/3ML
INJECTION, POWDER, FOR SOLUTION INTRAMUSCULAR; INTRAVENOUS
Status: DISCONTINUED | OUTPATIENT
Start: 2024-08-19 | End: 2024-08-19 | Stop reason: HOSPADM

## 2024-08-19 RX ORDER — MORPHINE SULFATE 4 MG/ML
2 INJECTION, SOLUTION INTRAMUSCULAR; INTRAVENOUS EVERY 4 HOURS PRN
Status: DISCONTINUED | OUTPATIENT
Start: 2024-08-19 | End: 2024-08-20 | Stop reason: HOSPADM

## 2024-08-19 RX ORDER — IOPAMIDOL 755 MG/ML
INJECTION, SOLUTION INTRAVASCULAR
Status: DISCONTINUED | OUTPATIENT
Start: 2024-08-19 | End: 2024-08-19 | Stop reason: HOSPADM

## 2024-08-19 RX ADMIN — HYDROCODONE BITARTRATE AND ACETAMINOPHEN 1 TABLET: 5; 325 TABLET ORAL at 07:08

## 2024-08-19 RX ADMIN — VALSARTAN 80 MG: 80 TABLET, FILM COATED ORAL at 07:08

## 2024-08-19 RX ADMIN — GABAPENTIN 300 MG: 300 CAPSULE ORAL at 07:08

## 2024-08-19 RX ADMIN — HYDROCODONE BITARTRATE AND ACETAMINOPHEN 1 TABLET: 5; 325 TABLET ORAL at 03:08

## 2024-08-19 RX ADMIN — TICAGRELOR 90 MG: 90 TABLET ORAL at 07:08

## 2024-08-19 RX ADMIN — CARVEDILOL 3.12 MG: 3.12 TABLET, FILM COATED ORAL at 07:08

## 2024-08-19 RX ADMIN — NITROGLYCERIN 1 INCH: 20 OINTMENT TOPICAL at 12:08

## 2024-08-19 RX ADMIN — ASPIRIN 81 MG CHEWABLE TABLET 81 MG: 81 TABLET CHEWABLE at 07:08

## 2024-08-19 RX ADMIN — ATORVASTATIN CALCIUM 40 MG: 40 TABLET, FILM COATED ORAL at 03:08

## 2024-08-19 RX ADMIN — HYDROCODONE BITARTRATE AND ACETAMINOPHEN 1 TABLET: 5; 325 TABLET ORAL at 11:08

## 2024-08-19 NOTE — BRIEF OP NOTE
[facility] - [facility] Medical Telemetry  Brief Operative Note    SUMMARY     Surgery Date: 8/19/2024     Surgeons and Role:     * Jared Abraham Jr., MD - Primary    Assisting Surgeon: None    Pre-op Diagnosis:  CAD (coronary artery disease) [I25.10]    Post-op Diagnosis:  Post-Op Diagnosis Codes:     * CAD (coronary artery disease) [I25.10]    Procedure(s) (LRB):  Angiogram, Coronary, with Left Heart Cath (N/A)    Anesthesia: RN IV Sedation    Implants:  Implant Name Type Inv. Item Serial No.  Lot No. LRB No. Used Action   STENT SYNERGY XD 3.0X48MM - [id number] stent coronary KIMBERLY - Balloon Exp STENT SYNERGY XD 3.0X48MM  [employer] [id number] N/A 1 Implanted   STENT SYNERGY XD 3.5X16MM - [id number] stent coronary KIMBERLY - Balloon Exp STENT SYNERGY XD 3.5X16MM  [employer] [id number] N/A 1 Implanted       Operative Findings: successful PCI as noted above    Estimated Blood Loss: * No values recorded between 8/19/2024  1:11 PM and 8/19/2024  1:45 PM *    Estimated Blood Loss has been documented.         Specimens:   Specimen (24h ago, onward)      None            [id number]

## 2024-08-19 NOTE — NURSING
Nurses Note -- 4 Eyes      8/18/2024   9:48 PM      Skin assessed during: Daily Assessment      [x] No Altered Skin Integrity Present    []Prevention Measures Documented      [] Yes- Altered Skin Integrity Present or Discovered   [] LDA Added if Not in Epic (Describe Wound)   [] New Altered Skin Integrity was Present on Admit and Documented in LDA   [] Wound Image Taken    Wound Care Consulted? No    Attending Nurse:  Sarah Alexander RN/Staff Member:   Jatin

## 2024-08-19 NOTE — PROGRESS NOTES
"  [facility]    Cardiology  Progress Note    Patient Name: Gilma Edmonds  MRN: [id number]  Admission Date: 2024  Hospital Length of Stay: 3 days  Code Status: Prior   Attending Provider: Elham Palacio MD   Consulting Provider: GEORGINA Smiley  Primary Care Physician: Emma Rea NP  Principal Problem:<principal problem not specified>    Patient information was obtained from patient, past medical records, and ER records.     Subjective:     Chief Complaint/Reason for Consult: MVCAD/Request for High Risk PCI    HPI: Ms. Edmonds is a 53 y/o female who is known to JEANNA, Dr. Franco. The patient presented to [facility] on 24 via transfer from [facility]. She initially presented to [facility] with c/o CP and was found to have an Elevated Troponin. She was evaluated by Cardiology and she underwent a LHC with results of MVCAD. She was transferred to [facility] for a CABG Evaluation and CV Surgery recommended High Risk PCI, therefore, [facility] was consulted for this reason.     24: NAD. "I am feeling well." Denies CP, SOB and Palps. NPO for LHC with PCI.     PMH: OA, MVCAD, Tobacco/ETOH Use  PSH: L Ankle Hardware Removal, , L Ankle Surgery, Hand and Wrist Surgery   Family History: Father, L, RA; Mother L, Lung Cancer  Social History: Denies Illicit Drug,+ ETOH Use Daily, and + Tobacco Use 1 ppd for 30+ Years    Previous Cardiac Diagnostics:   [facility] 24:  The Prox RCA lesion was 90% stenosed.  The Mid RCA-1 lesion was 70% stenosed.  The Mid RCA-2 lesion was 80% stenosed.  The 1st Diag lesion was 70% stenosed.  The pre-procedure left ventricular end diastolic pressure was 12.  The estimated blood loss was <50 mL.  There was two vessel coronary artery disease.  FINDINGS  LVEDP:  12 mmHg  Left Main:  No stenosis  LAD:  nonobstructive disease   Dx1:  70% ostial/proximal stenosis   Circumflex:  No stenosis  RCA:  mid stenosis (3 tandem lesions)   RECOMMENDATIONS  Refer for CABG evaluation  Risk factor " modifications   Activity -- avoid straining with affected limb for one week  Exercise -- as tolerated  Precautions post D/C -- come to ER for hematoma, unusual pain, erythema, or unusual drainage at access site  Precautions post D/C -- if develop bleeding or hematoma at access site, hold pressure at access site, and come to ER    ECHO 8.14.24:  Left Ventricle: The left ventricle is normal in size. Normal wall thickness. There is normal systolic function. Biplane (2D) method of discs ejection fraction is 63%.  Right Ventricle: Mild right ventricular enlargement. Systolic function is normal.  Mitral Valve: There is mild to moderate regurgitation.  Pulmonary Artery: The estimated pulmonary artery systolic pressure is 27 mmHg.  IVC/SVC: Intermediate venous pressure at 8 mmHg.    Review of patient's allergies indicates:   Allergen Reactions    Meloxicam Swelling    Sulfa (sulfonamide antibiotics) Hives     On tongue    Sulfamethoxazole-trimethoprim Hives     On tongue     Current Facility-Administered Medications on File Prior to Encounter   Medication    lactated ringers infusion     Current Outpatient Medications on File Prior to Encounter   Medication Sig    gabapentin (NEURONTIN) 300 MG capsule Take 1 capsule (300 mg total) by mouth 3 (three) times daily.    pantoprazole (PROTONIX) 40 MG tablet Take 40 mg by mouth once daily.    aspirin 81 MG Chew Take 1 tablet (81 mg total) by mouth 2 (two) times a day.    gabapentin (NEURONTIN) 300 MG capsule Take 1 capsule (300 mg total) by mouth 3 (three) times daily.    methocarbamoL (ROBAXIN) 750 MG Tab Take 750 mg by mouth 4 (four) times daily. Not taking     Review of Systems   Constitutional:  Negative for fatigue.   Respiratory:  Negative for shortness of breath.    Cardiovascular:  Negative for chest pain, palpitations and leg swelling.   All other systems reviewed and are negative.    Objective:     Vital Signs (Most Recent):  Temp: 97.8 °F (36.6 °C) (08/19/24  1406)  Pulse: 63 (08/19/24 1406)  Resp: 18 (08/19/24 1406)  BP: (!) 150/78 (08/19/24 1406)  SpO2: 95 % (08/19/24 1406) Vital Signs (24h Range):  Temp:  [97.8 °F (36.6 °C)-98.7 °F (37.1 °C)] 97.8 °F (36.6 °C)  Pulse:  [59-75] 63  Resp:  [16-20] 18  SpO2:  [94 %-96 %] 95 %  BP: (103-150)/(63-81) 150/78   Weight: 55.8 kg (123 lb 0.3 oz)  Body mass index is 22.5 kg/m².  SpO2: 95 %       Intake/Output Summary (Last 24 hours) at 8/19/2024 1454  Last data filed at 8/18/2024 2158  Gross per 24 hour   Intake 540 ml   Output --   Net 540 ml     Lines/Drains/Airways       Peripheral Intravenous Line  Duration                  Peripheral IV - Single Lumen 08/14/24 0824 20 G Anterior;Right Forearm 5 days                  Significant Labs:   Chemistries:   Recent Labs   Lab 08/14/24  0825 08/14/24  1104 08/14/24  1727 08/14/24  2012 08/15/24  0219 08/15/24  0809 08/15/24  1442 08/16/24  0703 08/17/24  0511 08/19/24  0423     --   --   --  140  --   --  141 139 136   K 3.2*  --   --   --  3.8  --   --  3.7 4.0 4.1     --   --   --  108*  --   --  110* 108* 105   CO2 19*  --   --   --  23  --   --  23 22 21*   BUN 13.1  --   --   --  8.8*  --   --  10.9 6.6* 7.4*   CREATININE 0.57  --   --   --  0.60  --   --  0.59 0.60 0.69   CALCIUM 9.3  --   --   --  8.8  --   --  8.6 8.6 8.6   BILITOT 0.6  --   --   --  0.8  --   --  0.3 0.4 0.4   ALKPHOS 121  --   --   --  98  --   --  91 103 114   ALT 19  --   --   --  14  --   --  23 30 36   AST 31  --   --   --  28  --   --  42* 44* 30   GLUCOSE 114*  --   --   --  86  --   --  105* 94 106*   MG 1.90  --   --   --  2.00  --   --  1.70  --  2.00   PHOS  --   --   --   --  2.9  --   --  3.8  --   --    TROPONINI 0.819*   < > 2.489* 2.690* 2.043* 1.615* 1.237*  --   --   --     < > = values in this interval not displayed.        CBC/Anemia Labs: Coags:    Recent Labs   Lab 08/17/24  0511 08/18/24  0706 08/19/24  0423   WBC 10.27 12.39* 11.15   HGB 12.1 12.6 12.3   HCT 36.1* 37.0  36.2*    260 277   MCV 99.4* 97.1* 98.1*   RDW 14.6 14.4 14.7    Recent Labs   Lab 08/14/24  0932 08/14/24  2012 08/15/24  0219 08/16/24  0703 08/17/24  0511 08/18/24  0706 08/19/24  0423   INR 1.0 1.0  --   --  1.0  --   --    APTT  --  37.1*   < > 55.7*  --  73.5* 82.7*    < > = values in this interval not displayed.        Telemetry: SR    Physical Exam  Constitutional:       General: She is not in acute distress.     Appearance: Normal appearance.   HENT:      Head: Normocephalic.      Mouth/Throat:      Mouth: Mucous membranes are moist.   Eyes:      Extraocular Movements: Extraocular movements intact.      Conjunctiva/sclera: Conjunctivae normal.   Cardiovascular:      Rate and Rhythm: Normal rate and regular rhythm.      Pulses: Normal pulses.   Pulmonary:      Effort: Pulmonary effort is normal. No respiratory distress.      Breath sounds: Normal breath sounds.      Comments: RA  Abdominal:      Palpations: Abdomen is soft.   Musculoskeletal:         General: No swelling. Normal range of motion.   Skin:     General: Skin is warm and dry.      Comments: R Groin Soft/Flat, Non-Tender, No Sign of Bleed/Infection. +2 BLE Palpable Pedal Pulses    Neurological:      General: No focal deficit present.      Mental Status: She is alert and oriented to person, place, and time.   Psychiatric:         Mood and Affect: Mood normal.         Behavior: Behavior normal.         Judgment: Judgment normal.       Home Medications:   Current Facility-Administered Medications on File Prior to Encounter   Medication Dose Route Frequency Provider Last Rate Last Admin    lactated ringers infusion   Intravenous Continuous Evette Quintero MD 10 mL/hr at 04/30/24 1046 Restarted at 04/30/24 1236     Current Outpatient Medications on File Prior to Encounter   Medication Sig Dispense Refill    gabapentin (NEURONTIN) 300 MG capsule Take 1 capsule (300 mg total) by mouth 3 (three) times daily. 90 capsule 0    pantoprazole  (PROTONIX) 40 MG tablet Take 40 mg by mouth once daily.      aspirin 81 MG Chew Take 1 tablet (81 mg total) by mouth 2 (two) times a day. 84 tablet 0    gabapentin (NEURONTIN) 300 MG capsule Take 1 capsule (300 mg total) by mouth 3 (three) times daily. 90 capsule 11    methocarbamoL (ROBAXIN) 750 MG Tab Take 750 mg by mouth 4 (four) times daily. Not taking       Current Schedule Inpatient Medications:   aspirin  81 mg Oral Daily    carvediloL  3.125 mg Oral BID    gabapentin  300 mg Oral TID    nitroGLYCERIN 2% TD oint  1 inch Transdermal Q6H    ticagrelor  90 mg Oral BID    valsartan  80 mg Oral Daily     Continuous Infusions:    Assessment:   NSTEMI Type I    - Troponin Peak (2.69)  MVCAD    - [facility] (8.16.24) - Left Main: No stenosis, LAD: Nonobstructive Disease, DX1: 70% Ostial/Proximal Stenosis, Circumflex: No Stenosis, RCA: Mid Stenosis (3 Tandem Lesions)   Tobacco Use  ETOH Use  No Hx of GIB     Plan:   Continue ASA, BB, ARB and Brilinta  Start Atorvastatin 40mg PO Qday   Keep NPO  Will Proceed with [facility] with PCI to RCA/Diag  Risk, Benefits and Alternatives Reviewed and Discussed with the PT and their Family and they wish to proceed with above Procedure. (Consents on Chart)  Labs and EKG in AM: CBC, CMP and Mg     GEORGINA Smiley  Cardiology  [facility]  08/19/2024     I agree with the findings of the complexity of problems addressed and take responsibility for the plan's risks and complications. I approved the plan documented by Geronimo Mims NP.  C with PCI RCA/Diag

## 2024-08-19 NOTE — PLAN OF CARE
08/19/24 1027   Discharge Assessment   Assessment Type Discharge Planning Assessment   Confirmed/corrected address, phone number and insurance Yes   Confirmed Demographics Correct on Facesheet   Source of Information patient   When was your last doctors appointment? 07/15/24   Does patient/caregiver understand observation status Yes   Communicated MALCOLM with patient/caregiver Yes   Reason For Admission CVD   People in Home alone   Facility Arrived From: home   Do you expect to return to your current living situation? Yes   Do you have help at home or someone to help you manage your care at home? Yes   Who are your caregiver(s) and their phone number(s)? fmly   Prior to hospitilization cognitive status: Alert/Oriented   Current cognitive status: Alert/Oriented   Walking or Climbing Stairs Difficulty no   Dressing/Bathing Difficulty no   Home Layout   (4 steps to enter home)   Equipment Currently Used at Home none   Readmission within 30 days? No   Patient currently being followed by outpatient case management? No   Do you currently have service(s) that help you manage your care at home? No   Do you take prescription medications? Yes   Do you have any problems affording any of your prescribed medications? No   Is the patient taking medications as prescribed? yes   Who is going to help you get home at discharge? fmly   How do you get to doctors appointments? car, drives self   Are you on dialysis? No   Do you take coumadin? No   Discharge Plan A Home   Discharge Plan B Home Health   DME Needed Upon Discharge  none   Discharge Plan discussed with: Patient   Transition of Care Barriers None   Housing Stability   In the last 12 months, was there a time when you were not able to pay the mortgage or rent on time? N   At any time in the past 12 months, were you homeless or living in a shelter (including now)? N   Transportation Needs   Has the lack of transportation kept you from medical appointments, meetings, work or from  getting things needed for daily living? No   Food Insecurity   Within the past 12 months, you worried that your food would run out before you got the money to buy more. Never true   Within the past 12 months, the food you bought just didn't last and you didn't have money to get more. Never true   Utilities   In the past 12 months has the electric, gas, oil, or water company threatened to shut off services in your home? No     Pt lives alone. She does drive, has no DME and has not used HH services. Needs TBD .

## 2024-08-20 VITALS
TEMPERATURE: 98 F | SYSTOLIC BLOOD PRESSURE: 104 MMHG | HEART RATE: 65 BPM | BODY MASS INDEX: 22.41 KG/M2 | RESPIRATION RATE: 20 BRPM | WEIGHT: 121.81 LBS | OXYGEN SATURATION: 95 % | DIASTOLIC BLOOD PRESSURE: 65 MMHG | HEIGHT: 62 IN

## 2024-08-20 PROBLEM — I21.4 NSTEMI (NON-ST ELEVATED MYOCARDIAL INFARCTION): Status: ACTIVE | Noted: 2024-08-20

## 2024-08-20 PROBLEM — I25.10 CORONARY ARTERY DISEASE INVOLVING NATIVE CORONARY ARTERY OF NATIVE HEART WITHOUT ANGINA PECTORIS: Status: ACTIVE | Noted: 2024-08-20

## 2024-08-20 LAB
ALBUMIN SERPL-MCNC: 3.2 G/DL (ref 3.5–5)
ALBUMIN/GLOB SERPL: 1 RATIO (ref 1.1–2)
ALP SERPL-CCNC: 117 UNIT/L (ref 40–150)
ALT SERPL-CCNC: 29 UNIT/L (ref 0–55)
ANION GAP SERPL CALC-SCNC: 10 MEQ/L
AST SERPL-CCNC: 22 UNIT/L (ref 5–34)
BASOPHILS # BLD AUTO: 0.09 X10(3)/MCL
BASOPHILS NFR BLD AUTO: 0.8 %
BILIRUB SERPL-MCNC: 0.4 MG/DL
BUN SERPL-MCNC: 6.9 MG/DL (ref 9.8–20.1)
CALCIUM SERPL-MCNC: 8.9 MG/DL (ref 8.4–10.2)
CHLORIDE SERPL-SCNC: 105 MMOL/L (ref 98–107)
CO2 SERPL-SCNC: 19 MMOL/L (ref 22–29)
CREAT SERPL-MCNC: 0.64 MG/DL (ref 0.55–1.02)
CREAT/UREA NIT SERPL: 11
EOSINOPHIL # BLD AUTO: 0.43 X10(3)/MCL (ref 0–0.9)
EOSINOPHIL NFR BLD AUTO: 3.7 %
ERYTHROCYTE [DISTWIDTH] IN BLOOD BY AUTOMATED COUNT: 14.6 % (ref 11.5–17)
GFR SERPLBLD CREATININE-BSD FMLA CKD-EPI: >60 ML/MIN/1.73/M2
GLOBULIN SER-MCNC: 3.2 GM/DL (ref 2.4–3.5)
GLUCOSE SERPL-MCNC: 101 MG/DL (ref 74–100)
HCT VFR BLD AUTO: 39 % (ref 37–47)
HGB BLD-MCNC: 12.9 G/DL (ref 12–16)
IMM GRANULOCYTES # BLD AUTO: 0.11 X10(3)/MCL (ref 0–0.04)
IMM GRANULOCYTES NFR BLD AUTO: 1 %
LYMPHOCYTES # BLD AUTO: 2.21 X10(3)/MCL (ref 0.6–4.6)
LYMPHOCYTES NFR BLD AUTO: 19.2 %
MAGNESIUM SERPL-MCNC: 2.2 MG/DL (ref 1.6–2.6)
MCH RBC QN AUTO: 32.9 PG (ref 27–31)
MCHC RBC AUTO-ENTMCNC: 33.1 G/DL (ref 33–36)
MCV RBC AUTO: 99.5 FL (ref 80–94)
MONOCYTES # BLD AUTO: 1.56 X10(3)/MCL (ref 0.1–1.3)
MONOCYTES NFR BLD AUTO: 13.5 %
NEUTROPHILS # BLD AUTO: 7.14 X10(3)/MCL (ref 2.1–9.2)
NEUTROPHILS NFR BLD AUTO: 61.8 %
NRBC BLD AUTO-RTO: 0 %
OHS QRS DURATION: 74 MS
OHS QTC CALCULATION: 442 MS
PLATELET # BLD AUTO: 315 X10(3)/MCL (ref 130–400)
PMV BLD AUTO: 10 FL (ref 7.4–10.4)
POTASSIUM SERPL-SCNC: 4.2 MMOL/L (ref 3.5–5.1)
PROT SERPL-MCNC: 6.4 GM/DL (ref 6.4–8.3)
RBC # BLD AUTO: 3.92 X10(6)/MCL (ref 4.2–5.4)
SODIUM SERPL-SCNC: 134 MMOL/L (ref 136–145)
WBC # BLD AUTO: 11.54 X10(3)/MCL (ref 4.5–11.5)

## 2024-08-20 PROCEDURE — 93005 ELECTROCARDIOGRAM TRACING: CPT

## 2024-08-20 PROCEDURE — 99024 POSTOP FOLLOW-UP VISIT: CPT | Mod: ,,, | Performed by: PHYSICIAN ASSISTANT

## 2024-08-20 PROCEDURE — 83735 ASSAY OF MAGNESIUM: CPT | Performed by: NURSE PRACTITIONER

## 2024-08-20 PROCEDURE — 36415 COLL VENOUS BLD VENIPUNCTURE: CPT | Performed by: NURSE PRACTITIONER

## 2024-08-20 PROCEDURE — 85025 COMPLETE CBC W/AUTO DIFF WBC: CPT | Performed by: NURSE PRACTITIONER

## 2024-08-20 PROCEDURE — 80053 COMPREHEN METABOLIC PANEL: CPT | Performed by: NURSE PRACTITIONER

## 2024-08-20 PROCEDURE — 25000003 PHARM REV CODE 250: Performed by: PHYSICIAN ASSISTANT

## 2024-08-20 PROCEDURE — 25000003 PHARM REV CODE 250: Performed by: NURSE PRACTITIONER

## 2024-08-20 PROCEDURE — 93010 ELECTROCARDIOGRAM REPORT: CPT | Mod: ,,, | Performed by: INTERNAL MEDICINE

## 2024-08-20 RX ORDER — HYDROCODONE BITARTRATE AND ACETAMINOPHEN 5; 325 MG/1; MG/1
1 TABLET ORAL EVERY 12 HOURS PRN
Qty: 6 TABLET | Refills: 0 | Status: SHIPPED | OUTPATIENT
Start: 2024-08-20

## 2024-08-20 RX ORDER — IBUPROFEN 200 MG
1 TABLET ORAL DAILY
Qty: 21 PATCH | Refills: 1 | Status: SHIPPED | OUTPATIENT
Start: 2024-08-20

## 2024-08-20 RX ORDER — CARVEDILOL 3.12 MG/1
3.12 TABLET ORAL 2 TIMES DAILY
Qty: 180 TABLET | Refills: 3 | Status: SHIPPED | OUTPATIENT
Start: 2024-08-20 | End: 2025-08-20

## 2024-08-20 RX ORDER — VALSARTAN 80 MG/1
80 TABLET ORAL DAILY
Qty: 90 TABLET | Refills: 3 | Status: SHIPPED | OUTPATIENT
Start: 2024-08-21 | End: 2025-08-21

## 2024-08-20 RX ADMIN — CARVEDILOL 3.12 MG: 3.12 TABLET, FILM COATED ORAL at 09:08

## 2024-08-20 RX ADMIN — ASPIRIN 81 MG CHEWABLE TABLET 81 MG: 81 TABLET CHEWABLE at 09:08

## 2024-08-20 RX ADMIN — VALSARTAN 80 MG: 80 TABLET, FILM COATED ORAL at 09:08

## 2024-08-20 RX ADMIN — HYDROCODONE BITARTRATE AND ACETAMINOPHEN 1 TABLET: 5; 325 TABLET ORAL at 04:08

## 2024-08-20 RX ADMIN — HYDROCODONE BITARTRATE AND ACETAMINOPHEN 1 TABLET: 5; 325 TABLET ORAL at 01:08

## 2024-08-20 RX ADMIN — HYDROCODONE BITARTRATE AND ACETAMINOPHEN 1 TABLET: 5; 325 TABLET ORAL at 09:08

## 2024-08-20 RX ADMIN — GABAPENTIN 300 MG: 300 CAPSULE ORAL at 09:08

## 2024-08-20 RX ADMIN — TICAGRELOR 90 MG: 90 TABLET ORAL at 09:08

## 2024-08-20 RX ADMIN — HYDROCODONE BITARTRATE AND ACETAMINOPHEN 1 TABLET: 5; 325 TABLET ORAL at 12:08

## 2024-08-20 RX ADMIN — ATORVASTATIN CALCIUM 40 MG: 40 TABLET, FILM COATED ORAL at 09:08

## 2024-08-20 NOTE — NURSING
Nurses Note -- 4 Eyes      8/19/2024   8:00 AM      Skin assessed during: Daily Assessment      [x] No Altered Skin Integrity Present    [x]Prevention Measures Documented      [] Yes- Altered Skin Integrity Present or Discovered   [] LDA Added if Not in Epic (Describe Wound)   [] New Altered Skin Integrity was Present on Admit and Documented in LDA   [] Wound Image Taken    Wound Care Consulted? No    Attending Nurse:  Bandar Alexander RN/Staff Member:   Roni

## 2024-08-20 NOTE — DISCHARGE SUMMARY
"[facility]    Cardiology  Discharge Summary      Patient Name: Gilma Edmonds  MRN: [id number]  Admission Date: 2024  Hospital Length of Stay: 4 days  Discharge Date and Time:  2024 11:32 AM  Attending Physician: Elham Palacio MD  Discharging Provider: GEORGINA Smiley  Primary Care Physician: Emma Rea NP    HPI/Hospital Course: Ms. Edmonds is a 53 y/o female who is known to JEANNA, Dr. Franco. The patient presented to [facility] on 24 via transfer from [facility]. She initially presented to [facility] with c/o CP and was found to have an Elevated Troponin. She was evaluated by Cardiology and she underwent a LHC with results of MVCAD. She was transferred to [facility] for a CABG Evaluation and CV Surgery recommended High Risk PCI, therefore, [facility] was consulted for this reason.      24: NAD. "I am feeling well." Denies CP, SOB and Palps. NPO for [facility] with PCI.   24: NAD. "I am good." Denies CP, SOB and Palps. S/P PCI.      PMH: OA, MVCAD, Tobacco/ETOH Use  PSH: L Ankle Hardware Removal, , L Ankle Surgery, Hand and Wrist Surgery   Family History: Father, L, RA; Mother L, Lung Cancer  Social History: Denies Illicit Drug,+ ETOH Use Daily, and + Tobacco Use 1 ppd for 30+ Years     Previous Cardiac Diagnostics:   [facility] (Preliminary) 24:  PTCA/KIMBERLY to the RCA x 2 Stents   Diag 50% - No Need for Intervention     [facility] 24:  The Prox RCA lesion was 90% stenosed.  The Mid RCA-1 lesion was 70% stenosed.  The Mid RCA-2 lesion was 80% stenosed.  The 1st Diag lesion was 70% stenosed.  The pre-procedure left ventricular end diastolic pressure was 12.  The estimated blood loss was <50 mL.  There was two vessel coronary artery disease.  FINDINGS  LVEDP:  12 mmHg  Left Main:  No stenosis  LAD:  nonobstructive disease   Dx1:  70% ostial/proximal stenosis   Circumflex:  No stenosis  RCA:  mid stenosis (3 tandem lesions)   RECOMMENDATIONS  Refer for CABG evaluation  Risk factor modifications   Activity " -- avoid straining with affected limb for one week  Exercise -- as tolerated  Precautions post D/C -- come to ER for hematoma, unusual pain, erythema, or unusual drainage at access site  Precautions post D/C -- if develop bleeding or hematoma at access site, hold pressure at access site, and come to ER     ECHO 8.14.24:  Left Ventricle: The left ventricle is normal in size. Normal wall thickness. There is normal systolic function. Biplane (2D) method of discs ejection fraction is 63%.  Right Ventricle: Mild right ventricular enlargement. Systolic function is normal.  Mitral Valve: There is mild to moderate regurgitation.  Pulmonary Artery: The estimated pulmonary artery systolic pressure is 27 mmHg.  IVC/SVC: Intermediate venous pressure at 8 mmHg.    Procedure(s) (LRB):  Angiogram, Coronary, with Left Heart Cath (N/A)   Consults:   Consults (From admission, onward)          Status Ordering Provider     Inpatient consult to Cardiology  Once        Provider:  Cesilia Low MD    Completed GUERO AUGUSTINE          Final Active Diagnoses:    Diagnosis Date Noted POA    PRINCIPAL PROBLEM:  Coronary artery disease involving native coronary artery of native heart without angina pectoris [I25.10] 08/20/2024 Yes    NSTEMI (non-ST elevated myocardial infarction) [I21.4] 08/20/2024 Yes      Problems Resolved During this Admission:     Discharged Condition: stable    Review of Systems   Constitutional: Positive for malaise/fatigue.   Cardiovascular:  Negative for chest pain, dyspnea on exertion, leg swelling and palpitations.   Respiratory:  Negative for shortness of breath.    All other systems reviewed and are negative.    Physical Exam  Constitutional:       General: She is not in acute distress.     Appearance: Normal appearance.   HENT:      Head: Normocephalic.      Mouth/Throat:      Mouth: Mucous membranes are moist.   Eyes:      Extraocular Movements: Extraocular movements intact.      Conjunctiva/sclera:  Conjunctivae normal.   Cardiovascular:      Rate and Rhythm: Normal rate and regular rhythm.      Pulses: Normal pulses.   Pulmonary:      Effort: Pulmonary effort is normal. No respiratory distress.      Breath sounds: Normal breath sounds.      Comments: RA  Abdominal:      Palpations: Abdomen is soft.   Musculoskeletal:         General: No swelling. Normal range of motion.   Skin:     General: Skin is warm and dry.      Comments: R Groin Soft/Flat, Non-Tender, No Sign of Bleed/Infection. +2 BLE Palpable Pedal Pulses    Neurological:      General: No focal deficit present.      Mental Status: She is alert and oriented to person, place, and time.   Psychiatric:         Mood and Affect: Mood normal.         Behavior: Behavior normal.         Judgment: Judgment normal.       Disposition: Home or Self Care    Follow Up:   Follow-up Information       Jared Abraham Jr., MD Follow up on 8/27/2024.    Specialty: Cardiology  Why: Hospital Follow Up Post NSTEMI/PCI  @1:10pm  Contact information:  [address] [facility] [address]  [phone]               Emma Rea NP Follow up on 9/10/2024.    Specialty: Family Medicine  Why: @8:00am  Contact information:  [address] [facility] [address]  [phone]                           Patient Instructions:      Ambulatory referral/consult to Smoking Cessation Program   Standing Status: Future   Referral Priority: Routine Referral Type: Consultation   Referral Reason: Specialty Services Required   Requested Specialty: CTTS   Number of Visits Requested: 1     Activity as tolerated   Order Comments: Discharge Activity:  As Tolerated, No Heavy Lifting > 5 lbs., Notify MD with Symptoms of Bleed/Infection     Medications:  Reconciled Home Medications:      Medication List        START taking these medications      carvediloL 3.125 MG tablet  Commonly known as: COREG  Take 1 tablet (3.125 mg total) by mouth 2 (two) times daily.      HYDROcodone-acetaminophen 5-325 mg per tablet  Commonly known as: NORCO  Take 1 tablet by mouth every 12 (twelve) hours as needed for Pain.     nicotine 21 mg/24 hr  Commonly known as: NICODERM CQ  Place 1 patch onto the skin once daily.     rosuvastatin 40 MG Tab  Commonly known as: CRESTOR  Take 1 tablet (40 mg total) by mouth every evening.     ticagrelor 90 mg tablet  Commonly known as: BRILINTA  Take 1 tablet (90 mg total) by mouth 2 (two) times daily.     valsartan 80 MG tablet  Commonly known as: DIOVAN  Take 1 tablet (80 mg total) by mouth once daily.  Start taking on: August 21, 2024            CHANGE how you take these medications      gabapentin 300 MG capsule  Commonly known as: NEURONTIN  Take 1 capsule (300 mg total) by mouth 3 (three) times daily.  What changed: Another medication with the same name was removed. Continue taking this medication, and follow the directions you see here.            CONTINUE taking these medications      aspirin 81 MG Chew  Take 1 tablet (81 mg total) by mouth 2 (two) times a day.     methocarbamoL 750 MG Tab  Commonly known as: ROBAXIN  Take 750 mg by mouth 4 (four) times daily. Not taking     pantoprazole 40 MG tablet  Commonly known as: PROTONIX  Take 40 mg by mouth once daily.            Impression:  NSTEMI Type I    - Troponin Peak (2.69)  MVCAD    - s/p LHC (8.19.24) - PTCA/KIMBERLY to RCA x 2 Stents; Diagonal about 50%    - [facility] (8.16.24) - Left Main: No stenosis, LAD: Nonobstructive Disease, DX1: 70% Ostial/Proximal Stenosis, Circumflex: No Stenosis, RCA: Mid Stenosis (3 Tandem Lesions)   Tobacco Use  ETOH Use  No Hx of GIB    Plan:   Ok to D/C Home   F/U with [facility] in 1 Week as Directed Above  Groin Precautions  Medication per Medication Reconciliation     IMPORTANT Antiplatelet Medication Instructions:  The patient, Gilma Edmonds, was instructed by Geronimo Mims on the importance of Antiplatelet Medication(s) and she verbalizes understanding.   She will continue  taking her present antiplatelet Brilinta as prescribed.   Her new antiplatelet Brilinta was sent to the [facility] pharmacy.  **WARNING:  Never stop **PLAVIX, EFFIENT, or BRILINTA** without first speaking to a [facility] provider** (even if another physician or surgeon insists it must be stopped for a procedure)    Time spent on the discharge of patient: 39 minutes    GEORGINA Smiley  Cardiology  [facility]

## 2024-08-20 NOTE — PROGRESS NOTES
"Gilma Edmonds is a 54 y.o. female patient.   1. CVD (cardiovascular disease)    2. CAD (coronary artery disease)    3. Chest pain      Past Medical History:   Diagnosis Date    Arthritis      No past surgical history pertinent negatives on file.  Scheduled Meds:   aspirin  81 mg Oral Daily    atorvastatin  40 mg Oral Daily    carvediloL  3.125 mg Oral BID    gabapentin  300 mg Oral TID    nitroGLYCERIN 2% TD oint  1 inch Transdermal Q6H    ticagrelor  90 mg Oral BID    valsartan  80 mg Oral Daily     Continuous Infusions:      PRN Meds:  Current Facility-Administered Medications:     acetaminophen, 650 mg, Oral, Q4H PRN    aluminum-magnesium hydroxide-simethicone, 30 mL, Oral, Q4H PRN    hydrALAZINE, 10 mg, Intravenous, Q4H PRN    HYDROcodone-acetaminophen, 1 tablet, Oral, Q4H PRN    morphine, 2 mg, Intravenous, Q4H PRN    ondansetron, 4 mg, Intravenous, Q6H PRN    Review of patient's allergies indicates:   Allergen Reactions    Meloxicam Swelling    Sulfa (sulfonamide antibiotics) Hives     On tongue    Sulfamethoxazole-trimethoprim Hives     On tongue     There are no hospital problems to display for this patient.    Blood pressure 105/68, pulse 68, temperature 97.1 °F (36.2 °C), temperature source Axillary, resp. rate 20, height 5' 2" (1.575 m), weight 55.2 kg (121 lb 12.8 oz), SpO2 95%, not currently breastfeeding.    Subjective:    Mrs. Edmonds is a 54-year-old female admitted to [facility]   with chest pain.  Workup with Cardiology revealed evidence of right coronary   artery disease with high diagonal disease.  She was transferred to [facility] for further evaluation and treatment. Dr. Palacio discussed case with [facility]. They are planning to stent RCA and diagonal.  Remains on heparin gtt. Denies chest pain.     Objective:   GENERAL:  She is in no acute distress.  VITAL SIGNS:  Stable.  NECK:  Trachea midline.  No carotid bruits.  EYES:  EOMI, PERRLA.  LUNGS:  Clear and resonant bilaterally.  HEART:  Normal S1, " S2.  No murmurs.    ABDOMEN:  Soft.   EXTREMITIES:  Warm.  AFFECT:  Appropriate.   NEUROLOGIC:  2+ motor power.  MUSCULOSKELETAL:  No gross deformity.      Assesment/Plan:    CAD, 2 vessel disease  [facility] planning LHC and PTCA RCA and D1 today?  No plans for CABG at this time per Dr Palacio - d/w Dr Low   Will follow    Jared Gutierrez PA-C  8/20/2024

## 2024-08-20 NOTE — PLAN OF CARE
08/20/24 1402   Final Note   Assessment Type Final Discharge Note   Anticipated Discharge Disposition Home   Post-Acute Status   Discharge Delays None known at this time     Pt dc to home. No needs noted for CM

## 2024-08-20 NOTE — PROGRESS NOTES
Pt discharged home. Discharge education given. Medications, wound care, and followup appointments discussed. IV and tele discontinued. Pt wheeled down via wheelchair and picked up at front entrance by friend.

## 2024-08-22 ENCOUNTER — PATIENT MESSAGE (OUTPATIENT)
Dept: ADMINISTRATIVE | Facility: OTHER | Age: 54
End: 2024-08-22
Payer: MEDICAID

## 2024-08-22 ENCOUNTER — PATIENT MESSAGE (OUTPATIENT)
Dept: ADMINISTRATIVE | Facility: CLINIC | Age: 54
End: 2024-08-22
Payer: MEDICAID

## 2024-08-25 NOTE — DISCHARGE SUMMARY
[facility] Discharge Summary     Admitting Physician: Annika Spicer MD  Attending Physician: Bel att. providers found  Date of Admit: 8/14/2024  Date of Discharge: 8/16/2024    Condition: Stable  Outcome:  Patient transferred to another facility.  DISPOSITION: Discharged to Other Facility    Discharge Diagnoses     Patient Active Problem List   Diagnosis    Hepatic steatosis    Arthritis    Family history of rheumatoid arthritis    Cigarette nicotine dependence without complication    Chronic bilateral thoracic back pain    History of fracture of left ankle    History of pelvic fracture    Left elbow pain    Renal cyst    Bilateral hand pain    Chest pain    Shortness of breath    Effusion of left elbow    Compression fracture of L4 vertebra with routine healing    Other secondary scoliosis, thoracic region    Lumbar degenerative disc disease    Other chest pain    Tobacco dependency    Coronary artery disease involving native coronary artery of native heart without angina pectoris    NSTEMI (non-ST elevated myocardial infarction)       Principal Problem:  Other chest pain    Consultants and Procedures     Consultants:  IP CONSULT TO CARDIOLOGY    Procedures:   Cardiac catheterization    Brief Admission History      Gilma Edmonds is a 54 y.o. female with no significant past medical history, who presented to [facility] ED on 8/14/2024  with a primary complaint of chest pain. She states that she has had intermittent chest pain for the past couple of months. It previously resolved without intervention. As of 3 days ago, the chest pain has become constant and is described as tightness that radiates to both shoulders, down both arms, and to her mid back. It is mainly located midline and over her right chest, but spreads to both sides during activity. It is exacerbated by walking and activity. She reports drinking 1 oz of alcohol this morning which alleviated the chest pain slightly. She also reports a history of  intermittent palpitations at rest that self resolve after a couple of minutes. Due to the chest pain being constant for the past three days, she presented for further evaluation. In the ED, vitals upon arrival were T 97.8, /81, , RR 20, SpO2 97% on RA. Labs notable for K 3.2, bicarb 19, glucose 114. EtOH elevated. Troponin elevated at 0.819 and then 1.805 three hours later. BNP normal. PT/INR normal. EKG performed showed normal sinus rhythm with some T wave inversions in leads III and aVF. CXR performed showing no acute cardiopulomonary process. She was given NTG but states that it has not changed her pain much. She was also given  mL bolus. Medicine was consulted for further workup and management of chest pain.     Hospital Course with Pertinent Findings     Patient was admitted to Internal Medicine for workup of chest pain with elevated troponin, concern for NSTEMI. Troponin trended to peak of 2.69. TTE 8/14/24 normal LVEF with no obvious wall motion abnormalities. Loading dose ASA and Plavix given. Cardiology consulted for LHC given persistent chest pain and significant family history. Plavix held, ASA continued, and she was started on heparin drip. LHC performed 8/16/24 showing significant CAD and transfer was initiated for CABG evaluation.     Discharge physical exam:  Vitals reviewed.   Constitutional:       General: She is not in acute distress.     Appearance: She is not diaphoretic.   HENT:      Mouth/Throat:      Pharynx: Oropharynx is clear.   Cardiovascular:      Rate and Rhythm: Normal rate and regular rhythm.      Pulses: Normal pulses.      Heart sounds: Normal heart sounds. No murmur heard.  Pulmonary:      Effort: Pulmonary effort is normal. No respiratory distress.      Breath sounds: Normal breath sounds. No wheezing, rhonchi or rales.   Abdominal:      General: Bowel sounds are normal. There is no distension.      Palpations: Abdomen is soft.      Tenderness: There is no  abdominal tenderness.   Musculoskeletal:      Right lower leg: No edema.      Left lower leg: No edema.   Skin:     General: Skin is warm and dry.   Neurological:      Mental Status: She is alert and oriented to person, place, and time.   Psychiatric:         Behavior: Behavior is cooperative.        Discharge Medications        Medication List        CONTINUE taking these medications      aspirin 81 MG Chew  Take 1 tablet (81 mg total) by mouth 2 (two) times a day.     gabapentin 300 MG capsule  Commonly known as: NEURONTIN  Take 1 capsule (300 mg total) by mouth 3 (three) times daily.     methocarbamoL 750 MG Tab  Commonly known as: ROBAXIN            STOP taking these medications      oxyCODONE 5 MG immediate release tablet  Commonly known as: ROXICODONE              Discharge Information:     Patient transferred to [facility] for CABG evaluation. Stable at time of transfer.    TIME SPENT ON DISCHARGE: >30 minutes    Mary Kay Perez MD  [facility]

## 2024-09-06 ENCOUNTER — HOSPITAL ENCOUNTER (OUTPATIENT)
Dept: RADIOLOGY | Facility: HOSPITAL | Age: 54
Discharge: HOME | End: 2024-09-06
Attending: NURSE PRACTITIONER
Payer: MEDICAID

## 2024-09-06 DIAGNOSIS — S32.040D COMPRESSION FRACTURE OF L4 VERTEBRA WITH ROUTINE HEALING: ICD-10-CM

## 2024-09-06 DIAGNOSIS — M51.36 LUMBAR DEGENERATIVE DISC DISEASE: ICD-10-CM

## 2024-09-06 PROCEDURE — 72131 CT LUMBAR SPINE W/O DYE: CPT | Mod: TC

## 2024-09-09 ENCOUNTER — LAB VISIT (OUTPATIENT)
Dept: LAB | Facility: HOSPITAL | Age: 54
End: 2024-09-09
Attending: NURSE PRACTITIONER
Payer: MEDICAID

## 2024-09-09 ENCOUNTER — OFFICE VISIT (OUTPATIENT)
Dept: GASTROENTEROLOGY | Facility: CLINIC | Age: 54
End: 2024-09-09
Payer: MEDICAID

## 2024-09-09 VITALS
RESPIRATION RATE: 16 BRPM | WEIGHT: 121.38 LBS | HEIGHT: 62 IN | OXYGEN SATURATION: 99 % | DIASTOLIC BLOOD PRESSURE: 69 MMHG | BODY MASS INDEX: 22.34 KG/M2 | SYSTOLIC BLOOD PRESSURE: 103 MMHG | HEART RATE: 76 BPM | TEMPERATURE: 98 F

## 2024-09-09 DIAGNOSIS — Z12.11 SCREENING FOR COLON CANCER: ICD-10-CM

## 2024-09-09 DIAGNOSIS — K76.0 HEPATIC STEATOSIS: ICD-10-CM

## 2024-09-09 DIAGNOSIS — Z72.0 TOBACCO USER: ICD-10-CM

## 2024-09-09 DIAGNOSIS — K76.0 HEPATIC STEATOSIS: Primary | ICD-10-CM

## 2024-09-09 LAB
APTT PPP: 30.9 SECONDS (ref 23.2–33.7)
FERRITIN SERPL-MCNC: 53.97 NG/ML (ref 4.63–204)
INR PPP: 0.9
IRON SATN MFR SERPL: 30 % (ref 20–50)
IRON SERPL-MCNC: 93 UG/DL (ref 50–170)
PROTHROMBIN TIME: 12.5 SECONDS (ref 11.4–14)
TIBC SERPL-MCNC: 218 UG/DL (ref 70–310)
TIBC SERPL-MCNC: 311 UG/DL (ref 250–450)
TRANSFERRIN SERPL-MCNC: 285 MG/DL (ref 180–382)

## 2024-09-09 PROCEDURE — 3078F DIAST BP <80 MM HG: CPT | Mod: CPTII,,, | Performed by: NURSE PRACTITIONER

## 2024-09-09 PROCEDURE — 99204 OFFICE O/P NEW MOD 45 MIN: CPT | Mod: S$PBB,,, | Performed by: NURSE PRACTITIONER

## 2024-09-09 PROCEDURE — 1160F RVW MEDS BY RX/DR IN RCRD: CPT | Mod: CPTII,,, | Performed by: NURSE PRACTITIONER

## 2024-09-09 PROCEDURE — 1111F DSCHRG MED/CURRENT MED MERGE: CPT | Mod: CPTII,,, | Performed by: NURSE PRACTITIONER

## 2024-09-09 PROCEDURE — 86364 TISS TRNSGLTMNASE EA IG CLAS: CPT

## 2024-09-09 PROCEDURE — 4010F ACE/ARB THERAPY RXD/TAKEN: CPT | Mod: CPTII,,, | Performed by: NURSE PRACTITIONER

## 2024-09-09 PROCEDURE — 3044F HG A1C LEVEL LT 7.0%: CPT | Mod: CPTII,,, | Performed by: NURSE PRACTITIONER

## 2024-09-09 PROCEDURE — 99215 OFFICE O/P EST HI 40 MIN: CPT | Mod: PBBFAC | Performed by: NURSE PRACTITIONER

## 2024-09-09 PROCEDURE — 86039 ANTINUCLEAR ANTIBODIES (ANA): CPT

## 2024-09-09 PROCEDURE — 85730 THROMBOPLASTIN TIME PARTIAL: CPT

## 2024-09-09 PROCEDURE — 83540 ASSAY OF IRON: CPT

## 2024-09-09 PROCEDURE — 1159F MED LIST DOCD IN RCRD: CPT | Mod: CPTII,,, | Performed by: NURSE PRACTITIONER

## 2024-09-09 PROCEDURE — 36415 COLL VENOUS BLD VENIPUNCTURE: CPT

## 2024-09-09 PROCEDURE — 3008F BODY MASS INDEX DOCD: CPT | Mod: CPTII,,, | Performed by: NURSE PRACTITIONER

## 2024-09-09 PROCEDURE — 82390 ASSAY OF CERULOPLASMIN: CPT

## 2024-09-09 PROCEDURE — 86381 MITOCHONDRIAL ANTIBODY EACH: CPT

## 2024-09-09 PROCEDURE — 86225 DNA ANTIBODY NATIVE: CPT

## 2024-09-09 PROCEDURE — 86376 MICROSOMAL ANTIBODY EACH: CPT

## 2024-09-09 PROCEDURE — 82103 ALPHA-1-ANTITRYPSIN TOTAL: CPT

## 2024-09-09 PROCEDURE — 85610 PROTHROMBIN TIME: CPT

## 2024-09-09 PROCEDURE — 3074F SYST BP LT 130 MM HG: CPT | Mod: CPTII,,, | Performed by: NURSE PRACTITIONER

## 2024-09-09 PROCEDURE — 82728 ASSAY OF FERRITIN: CPT

## 2024-09-09 NOTE — ASSESSMENT & PLAN NOTE
Per review of laboratory results, LFTs normal August 20, 2024, albumin 3.2, platelets 315.    Laboratory results April 15, 2024 revealed , , alkaline phosphatase and total bilirubin within normal limits; acute viral hepatitis panel negative.    She reports that she broke her ankle around this time and was taking a lot of OTC Tylenol and ibuprofen, as well as drinking a half pint of whiskey daily.  Abdominal ultrasound April 17, 2024 revealed slight heterogenicity to the liver parenchyma with increased echogenicity suggestive of hepatic steatosis and no other significant abnormality noted.  Lifestyle and dietary modifications provided  Recommend good control of comorbidities  Recommend alcohol cessation  Will proceed with further workup for elevated LFTs (see below)  Abdominal ultrasound in 1 month   FibroScan   Call with updates  Follow-up clinic visit with NP in 6 months

## 2024-09-09 NOTE — PROGRESS NOTES
Subjective:       Patient ID: Gilma Edmonds is a 54 y.o. female.    Chief Complaint: Elevated Hepatic Enzymes (No complaints)    This 54-year-old  female with CAD, arthritis, and tobacco use is referred for hepatic steatosis.  She presents unaccompanied.  Per review of laboratory results, LFTs normal August 20, 2024, albumin 3.2, platelets 315.  Laboratory results April 15, 2024 revealed , , alkaline phosphatase and total bilirubin within normal limits; acute viral hepatitis panel negative.  She reports that she broke her ankle around this time and was taking a lot of OTC Tylenol and ibuprofen, as well as drinking a half pint of whiskey daily.    Abdominal ultrasound April 17, 2024 revealed slight heterogenicity to the liver parenchyma with increased echogenicity suggestive of hepatic steatosis and no other significant abnormality noted.    Today, she presents for an initial visit and reports feeling well.  She denies nocturnal symptoms, appetite changes, unintentional weight loss, fever, chills, nausea, vomiting, hematemesis, odynophagia, dysphagia, acid reflux, pyrosis, belching, bloating, early satiety, or abdominal pain.  Bowel habits are described as formed stools once daily without melena, hematochezia, fecal urgency, fecal incontinence, or pain with defecation.  She denies icterus, jaundice, pruritus, confusion, headaches, vision changes, cough, shortness of breath, chest pain, abdominal/lower extremity swelling, dark-colored urine, or pale-colored stools.    She denies ever having an EGD or colonoscopy done.  She takes aspirin and Brilinta daily.  She smokes 2 cigarettes per day and reports currently drinking a few glasses of wine a few times per week.  She denies illicit drug use.  She denies a family history of IBD, colon polyps, or colon cancer.      Review of patient's allergies indicates:   Allergen Reactions    Meloxicam Swelling    Sulfa (sulfonamide antibiotics) Hives     On  "tongue    Sulfamethoxazole-trimethoprim Hives     On tongue     Past Medical History:   Diagnosis Date    Arthritis     Coronary artery disease involving native coronary artery of native heart without angina pectoris 2024    NSTEMI (non-ST elevated myocardial infarction) 2024     Past Surgical History:   Procedure Laterality Date    ANGIOGRAM, CORONARY, WITH LEFT HEART CATHETERIZATION N/A 2024    Procedure: Angiogram, Coronary, with Left Heart Cath;  Surgeon: Jared Cassidy MD;  Location: [facility] CATH LAB;  Service: Cardiology;  Laterality: N/A;    ANGIOGRAM, CORONARY, WITH LEFT HEART CATHETERIZATION N/A 2024    Procedure: Angiogram, Coronary, with Left Heart Cath;  Surgeon: Jared Abraham Jr., MD;  Location: [facility] CATH LAB;  Service: Cardiology;  Laterality: N/A;    ANKLE HARDWARE REMOVAL Left 2024    Procedure: REMOVAL, HARDWARE, ANKLE;  Surgeon: Jared Ibanez MD;  Location: [facility];  Service: Orthopedics;  Laterality: Left;     SECTION      1    left ankle surgery      OPEN REDUCTION AND INTERNAL FIXATION (ORIF) OF PILON FRACTURE Left 2024    Procedure: ORIF, FRACTURE, PILON;  Surgeon: Jared Ibanez MD;  Location: [facility];  Service: Orthopedics;  Laterality: Left;  Open "Broken" Schukla set/hold Screw Removal set    PERCUTANEOUS CORONARY INTERVENTION, ARTERY N/A 2024    Procedure: Percutaneous coronary intervention;  Surgeon: Jared Cassidy MD;  Location: [facility] CATH LAB;  Service: Cardiology;  Laterality: N/A;    Right habd and wrist scope       Family History:   family history includes Diabetes in her maternal grandmother; Lung cancer in her mother; Rheum arthritis in her father, paternal grandmother, and paternal great-grandmother.    Social History:    reports that she has been smoking cigarettes. She started smoking about 35 years ago. She has a 18.5 pack-year smoking history. She has never used smokeless tobacco. She reports that she does not " currently use alcohol after a past usage of about 4.0 standard drinks of alcohol per week. She reports that she does not currently use drugs.    Review of Systems  Negative except as noted in the HPI.      Objective:      Physical Exam  Constitutional:       Appearance: Normal appearance.   HENT:      Head: Normocephalic.      Mouth/Throat:      Mouth: Mucous membranes are moist.   Eyes:      Extraocular Movements: Extraocular movements intact.      Conjunctiva/sclera: Conjunctivae normal.      Pupils: Pupils are equal, round, and reactive to light.   Cardiovascular:      Rate and Rhythm: Normal rate and regular rhythm.      Pulses: Normal pulses.      Heart sounds: Normal heart sounds.   Pulmonary:      Effort: Pulmonary effort is normal.      Breath sounds: Normal breath sounds.   Abdominal:      General: Bowel sounds are normal.      Palpations: Abdomen is soft.   Musculoskeletal:         General: Normal range of motion.      Cervical back: Normal range of motion and neck supple.   Skin:     General: Skin is warm and dry.   Neurological:      General: No focal deficit present.      Mental Status: She is alert and oriented to person, place, and time.   Psychiatric:         Mood and Affect: Mood normal.         Behavior: Behavior normal.         Thought Content: Thought content normal.         Judgment: Judgment normal.         Home Medications:     Current Outpatient Medications   Medication Sig    aspirin 81 MG Chew Take 1 tablet (81 mg total) by mouth 2 (two) times a day.    carvediloL (COREG) 3.125 MG tablet Take 1 tablet (3.125 mg total) by mouth 2 (two) times daily.    gabapentin (NEURONTIN) 300 MG capsule Take 1 capsule (300 mg total) by mouth 3 (three) times daily.    methocarbamoL (ROBAXIN) 750 MG Tab Take 750 mg by mouth 4 (four) times daily. Not taking    nicotine (NICODERM CQ) 21 mg/24 hr Place 1 patch onto the skin once daily.    pantoprazole (PROTONIX) 40 MG tablet Take 40 mg by mouth once daily.     rosuvastatin (CRESTOR) 40 MG Tab Take 1 tablet (40 mg total) by mouth every evening.    ticagrelor (BRILINTA) 90 mg tablet Take 1 tablet (90 mg total) by mouth 2 (two) times daily.    valsartan (DIOVAN) 80 MG tablet Take 1 tablet (80 mg total) by mouth once daily.    HYDROcodone-acetaminophen (NORCO) 5-325 mg per tablet Take 1 tablet by mouth every 12 (twelve) hours as needed for Pain. (Patient not taking: Reported on 9/9/2024)     No current facility-administered medications for this visit.     Facility-Administered Medications Ordered in Other Visits   Medication Frequency    lactated ringers infusion Continuous     Laboratory Results:     Recent Results (from the past 4032 hour(s))   Comprehensive metabolic panel    Collection Time: 04/15/24  4:58 PM   Result Value Ref Range    Sodium 137 136 - 145 mmol/L    Potassium 3.8 3.5 - 5.1 mmol/L    Chloride 106 98 - 107 mmol/L    CO2 17 (L) 22 - 29 mmol/L    Glucose 110 (H) 74 - 100 mg/dL    Blood Urea Nitrogen 17.5 9.8 - 20.1 mg/dL    Creatinine 0.72 0.55 - 1.02 mg/dL    Calcium 9.9 8.4 - 10.2 mg/dL    Protein Total 7.7 6.4 - 8.3 gm/dL    Albumin 3.9 3.5 - 5.0 g/dL    Globulin 3.8 (H) 2.4 - 3.5 gm/dL    Albumin/Globulin Ratio 1.0 (L) 1.1 - 2.0 ratio    Bilirubin Total 1.2 <=1.5 mg/dL     40 - 150 unit/L     (H) 0 - 55 unit/L     (H) 5 - 34 unit/L    eGFR >60 mls/min/1.73/m2   D dimer, quantitative    Collection Time: 04/15/24  4:58 PM   Result Value Ref Range    D-Dimer 1.85 (H) 0.00 - 0.50 ug/mL FEU   CBC with Differential    Collection Time: 04/15/24  4:58 PM   Result Value Ref Range    WBC 11.78 (H) 4.50 - 11.50 x10(3)/mcL    RBC 4.57 4.20 - 5.40 x10(6)/mcL    Hgb 16.3 (H) 12.0 - 16.0 g/dL    Hct 45.6 37.0 - 47.0 %    MCV 99.8 (H) 80.0 - 94.0 fL    MCH 35.7 (H) 27.0 - 31.0 pg    MCHC 35.7 33.0 - 36.0 g/dL    RDW 13.6 11.5 - 17.0 %    Platelet 438 (H) 130 - 400 x10(3)/mcL    MPV 9.7 7.4 - 10.4 fL    Neut % 77.2 %    Lymph % 12.2 %    Mono % 6.3 %     Eos % 2.8 %    Basophil % 0.8 %    Lymph # 1.44 0.6 - 4.6 x10(3)/mcL    Neut # 9.09 2.1 - 9.2 x10(3)/mcL    Mono # 0.74 0.1 - 1.3 x10(3)/mcL    Eos # 0.33 0 - 0.9 x10(3)/mcL    Baso # 0.10 <=0.2 x10(3)/mcL    IG# 0.08 (H) 0 - 0.04 x10(3)/mcL    IG% 0.7 %    NRBC% 0.0 %   Hepatitis Panel, Acute    Collection Time: 04/15/24  4:58 PM   Result Value Ref Range    Hep A IgM Interp Nonreactive Nonreactive    Hep B Core IgM Interp Nonreactive Nonreactive    Hep BsAg Interp Nonreactive Nonreactive    Hep C Ab Interp Nonreactive Nonreactive   Sedimentation Rate    Collection Time: 04/15/24  4:58 PM   Result Value Ref Range    Sed Rate 18 0 - 20 mm/hr   C-reactive protein    Collection Time: 04/15/24  4:58 PM   Result Value Ref Range    CRP 31.20 (H) <5.00 mg/L   Acetaminophen Level    Collection Time: 04/15/24  4:58 PM   Result Value Ref Range    Acetaminophen Level <17.4 (L) 17.4 - 30.0 ug/ml   Protime-INR    Collection Time: 04/15/24  4:58 PM   Result Value Ref Range    PT 12.7 11.4 - 14.0 seconds    INR 1.0 <=1.3   APTT    Collection Time: 04/15/24  4:58 PM   Result Value Ref Range    PTT 33.6 23.2 - 33.7 seconds   Salicylate Level    Collection Time: 04/15/24  4:58 PM   Result Value Ref Range    Salicylate Level <5.0 (L) 15.0 - 30.0 mg/dL   Lipase    Collection Time: 04/15/24  4:58 PM   Result Value Ref Range    Lipase Level 24 <=60 U/L   Gold Top Hold    Collection Time: 04/15/24  5:07 PM   Result Value Ref Range    Extra Tube Hold for add-ons.    Ammonia    Collection Time: 04/15/24  7:37 PM   Result Value Ref Range    Ammonia Level 46.7 18.0 - 72.0 umol/L   Lactic acid, plasma    Collection Time: 04/15/24  7:37 PM   Result Value Ref Range    Lactic Acid Level 0.8 0.5 - 2.2 mmol/L   Light Blue Top Hold    Collection Time: 04/15/24  7:42 PM   Result Value Ref Range    Extra Tube Hold for add-ons.    Light Green Top Hold    Collection Time: 04/15/24  7:42 PM   Result Value Ref Range    Extra Tube Hold for add-ons.     Lavender Top Hold    Collection Time: 04/15/24  7:42 PM   Result Value Ref Range    Extra Tube Hold for add-ons.    Gold Top Hold    Collection Time: 04/15/24  7:42 PM   Result Value Ref Range    Extra Tube Hold for add-ons.    Comprehensive metabolic panel    Collection Time: 04/15/24  8:59 PM   Result Value Ref Range    Sodium 136 136 - 145 mmol/L    Potassium 4.0 3.5 - 5.1 mmol/L    Chloride 107 98 - 107 mmol/L    CO2 18 (L) 22 - 29 mmol/L    Glucose 140 (H) 74 - 100 mg/dL    Blood Urea Nitrogen 14.6 9.8 - 20.1 mg/dL    Creatinine 0.67 0.55 - 1.02 mg/dL    Calcium 9.4 8.4 - 10.2 mg/dL    Protein Total 7.6 6.4 - 8.3 gm/dL    Albumin 3.6 3.5 - 5.0 g/dL    Globulin 4.0 (H) 2.4 - 3.5 gm/dL    Albumin/Globulin Ratio 0.9 (L) 1.1 - 2.0 ratio    Bilirubin Total 0.8 <=1.5 mg/dL     40 - 150 unit/L     (H) 0 - 55 unit/L     (H) 5 - 34 unit/L    eGFR >60 mls/min/1.73/m2   Comprehensive Metabolic Panel    Collection Time: 04/30/24  8:49 AM   Result Value Ref Range    Sodium 139 136 - 145 mmol/L    Potassium 3.8 3.5 - 5.1 mmol/L    Chloride 107 98 - 107 mmol/L    CO2 20 (L) 22 - 29 mmol/L    Glucose 123 (H) 74 - 100 mg/dL    Blood Urea Nitrogen 14.3 9.8 - 20.1 mg/dL    Creatinine 0.76 0.55 - 1.02 mg/dL    Calcium 10.1 8.4 - 10.2 mg/dL    Protein Total 8.1 6.4 - 8.3 gm/dL    Albumin 3.9 3.5 - 5.0 g/dL    Globulin 4.2 (H) 2.4 - 3.5 gm/dL    Albumin/Globulin Ratio 0.9 (L) 1.1 - 2.0 ratio    Bilirubin Total 0.4 <=1.5 mg/dL     40 - 150 unit/L    ALT 27 0 - 55 unit/L    AST 25 5 - 34 unit/L    eGFR >60 mls/min/1.73/m2   Protime-INR    Collection Time: 04/30/24  8:49 AM   Result Value Ref Range    PT 12.8 11.4 - 14.0 seconds    INR 1.0 <=1.3   Comprehensive Metabolic Panel    Collection Time: 07/19/24 11:22 AM   Result Value Ref Range    Sodium 139 136 - 145 mmol/L    Potassium 3.4 (L) 3.5 - 5.1 mmol/L    Chloride 108 (H) 98 - 107 mmol/L    CO2 21 (L) 22 - 29 mmol/L    Glucose 111 (H) 74 - 100 mg/dL     Blood Urea Nitrogen 13.6 9.8 - 20.1 mg/dL    Creatinine 0.60 0.55 - 1.02 mg/dL    Calcium 9.2 8.4 - 10.2 mg/dL    Protein Total 7.2 6.4 - 8.3 gm/dL    Albumin 3.9 3.5 - 5.0 g/dL    Globulin 3.3 2.4 - 3.5 gm/dL    Albumin/Globulin Ratio 1.2 1.1 - 2.0 ratio    Bilirubin Total 0.7 <=1.5 mg/dL     40 - 150 unit/L    ALT 13 0 - 55 unit/L    AST 22 5 - 34 unit/L    eGFR >60 mL/min/1.73/m2    Anion Gap 10.0 mEq/L    BUN/Creatinine Ratio 23    Hemoglobin A1C    Collection Time: 07/19/24 11:22 AM   Result Value Ref Range    Hemoglobin A1c 4.9 <=7.0 %    Estimated Average Glucose 93.9 mg/dL   Lipid Panel    Collection Time: 07/19/24 11:22 AM   Result Value Ref Range    Cholesterol Total 158 <=200 mg/dL    HDL Cholesterol 56 35 - 60 mg/dL    Triglyceride 95 37 - 140 mg/dL    Cholesterol/HDL Ratio 3 0 - 5    Very Low Density Lipoprotein 19     LDL Cholesterol 83.00 50.00 - 140.00 mg/dL   TSH    Collection Time: 07/19/24 11:22 AM   Result Value Ref Range    TSH 1.426 0.350 - 4.940 uIU/mL   HIV 1/2 Ag/Ab (4th Gen)    Collection Time: 07/19/24 11:22 AM   Result Value Ref Range    HIV Nonreactive Nonreactive   SYPHILIS ANTIBODY (WITH REFLEX RPR)    Collection Time: 07/19/24 11:22 AM   Result Value Ref Range    Syphilis Antibody Nonreactive Nonreactive, Equivocal   Antinuclear Antibody (VINAYAK), HEp-2, IgG    Collection Time: 07/19/24 11:22 AM   Result Value Ref Range    Antinuclear Antibody (VINAYAK), HEp-2, IgG <1:80 <1:80    VINAYAK Interpretive Comment See Note    Rheumatoid Factors, IgA, IgG, IgM    Collection Time: 07/19/24 11:22 AM   Result Value Ref Range    Rheumatoid Factor, IgA by TIERRA 6 <=6 Units    Rheumatoid Factor, IgM by TIERRA 5 <=6 Units    Rheumatoid Factor, IgG by TIERRA 6 <=6 Units   CBC with Differential    Collection Time: 07/19/24 11:22 AM   Result Value Ref Range    WBC 9.80 4.50 - 11.50 x10(3)/mcL    RBC 4.60 4.20 - 5.40 x10(6)/mcL    Hgb 15.2 12.0 - 16.0 g/dL    Hct 43.8 37.0 - 47.0 %    MCV 95.2 (H) 80.0 -  94.0 fL    MCH 33.0 (H) 27.0 - 31.0 pg    MCHC 34.7 33.0 - 36.0 g/dL    RDW 13.6 11.5 - 17.0 %    Platelet 390 130 - 400 x10(3)/mcL    MPV 9.4 7.4 - 10.4 fL    Neut % 62.0 %    Lymph % 26.9 %    Mono % 7.6 %    Eos % 2.6 %    Basophil % 0.7 %    Lymph # 2.64 0.6 - 4.6 x10(3)/mcL    Neut # 6.08 2.1 - 9.2 x10(3)/mcL    Mono # 0.74 0.1 - 1.3 x10(3)/mcL    Eos # 0.25 0 - 0.9 x10(3)/mcL    Baso # 0.07 <=0.2 x10(3)/mcL    IG# 0.02 0 - 0.04 x10(3)/mcL    IG% 0.2 %    NRBC% 0.0 %   Urinalysis    Collection Time: 07/19/24 12:33 PM   Result Value Ref Range    Color, UA Yellow Yellow, Light-Yellow, Dark Yellow, Ina, Straw    Appearance, UA Turbid (A) Clear    Specific Gravity, UA 1.022 1.005 - 1.030    pH, UA 6.0 5.0 - 8.5    Protein, UA Trace (A) Negative    Glucose, UA Normal Negative, Normal    Ketones, UA 1+ (A) Negative    Blood, UA Trace (A) Negative    Bilirubin, UA Negative Negative    Urobilinogen, UA Normal 0.2, 1.0, Normal    Nitrites, UA Negative Negative    Leukocyte Esterase, UA Negative Negative    RBC, UA 0-5 None Seen, 0-2, 3-5, 0-5 /HPF    WBC, UA 0-5 None Seen, 0-2, 3-5, 0-5 /HPF    Bacteria, UA Trace (A) None Seen /HPF    Squamous Epithelial Cells, UA Moderate (A) None Seen /HPF    Mucous, UA Occ (A) None Seen /LPF    Hyaline Casts, UA None Seen None Seen /lpf   EKG 12-lead    Collection Time: 07/24/24 12:15 PM   Result Value Ref Range    QRS Duration 88 ms    [facility] QTC Calculation 445 ms   EKG 12-lead    Collection Time: 08/14/24  7:40 AM   Result Value Ref Range    QRS Duration 90 ms    [facility] QTC Calculation 474 ms   Comprehensive metabolic panel    Collection Time: 08/14/24  8:25 AM   Result Value Ref Range    Sodium 139 136 - 145 mmol/L    Potassium 3.2 (L) 3.5 - 5.1 mmol/L    Chloride 106 98 - 107 mmol/L    CO2 19 (L) 22 - 29 mmol/L    Glucose 114 (H) 74 - 100 mg/dL    Blood Urea Nitrogen 13.1 9.8 - 20.1 mg/dL    Creatinine 0.57 0.55 - 1.02 mg/dL    Calcium 9.3 8.4 - 10.2 mg/dL    Protein Total 6.9 6.4 -  8.3 gm/dL    Albumin 3.7 3.5 - 5.0 g/dL    Globulin 3.2 2.4 - 3.5 gm/dL    Albumin/Globulin Ratio 1.2 1.1 - 2.0 ratio    Bilirubin Total 0.6 <=1.5 mg/dL     40 - 150 unit/L    ALT 19 0 - 55 unit/L    AST 31 5 - 34 unit/L    eGFR >60 mL/min/1.73/m2    Anion Gap 14.0 mEq/L    BUN/Creatinine Ratio 23    Troponin I #1    Collection Time: 08/14/24  8:25 AM   Result Value Ref Range    Troponin-I 0.819 (H) 0.000 - 0.045 ng/mL   B-Type natriuretic peptide (BNP)    Collection Time: 08/14/24  8:25 AM   Result Value Ref Range    Natriuretic Peptide 84.5 <=100.0 pg/mL   CBC with Differential    Collection Time: 08/14/24  8:25 AM   Result Value Ref Range    WBC 9.79 4.50 - 11.50 x10(3)/mcL    RBC 4.61 4.20 - 5.40 x10(6)/mcL    Hgb 15.6 12.0 - 16.0 g/dL    Hct 43.7 37.0 - 47.0 %    MCV 94.8 (H) 80.0 - 94.0 fL    MCH 33.8 (H) 27.0 - 31.0 pg    MCHC 35.7 33.0 - 36.0 g/dL    RDW 14.2 11.5 - 17.0 %    Platelet 346 130 - 400 x10(3)/mcL    MPV 9.5 7.4 - 10.4 fL    Neut % 74.2 %    Lymph % 13.4 %    Mono % 9.9 %    Eos % 1.7 %    Basophil % 0.5 %    Lymph # 1.31 0.6 - 4.6 x10(3)/mcL    Neut # 7.26 2.1 - 9.2 x10(3)/mcL    Mono # 0.97 0.1 - 1.3 x10(3)/mcL    Eos # 0.17 0 - 0.9 x10(3)/mcL    Baso # 0.05 <=0.2 x10(3)/mcL    IG# 0.03 0 - 0.04 x10(3)/mcL    IG% 0.3 %    NRBC% 0.0 %   Ethanol    Collection Time: 08/14/24  8:25 AM   Result Value Ref Range    Ethanol Level 39.0 (H) <=10.0 mg/dL   Magnesium    Collection Time: 08/14/24  8:25 AM   Result Value Ref Range    Magnesium Level 1.90 1.60 - 2.60 mg/dL   Light Blue Top Hold    Collection Time: 08/14/24  8:36 AM   Result Value Ref Range    Extra Tube Hold for add-ons.    Gold Top Hold    Collection Time: 08/14/24  8:36 AM   Result Value Ref Range    Extra Tube Hold for add-ons.    Pink Top Hold    Collection Time: 08/14/24  8:36 AM   Result Value Ref Range    Extra Tube Hold for add-ons.    Protime-INR    Collection Time: 08/14/24  9:32 AM   Result Value Ref Range    PT 13.5 11.4 -  14.0 seconds    INR 1.0 <=1.3   Troponin I #2    Collection Time: 08/14/24 11:04 AM   Result Value Ref Range    Troponin-I 1.805 (H) 0.000 - 0.045 ng/mL   Troponin I    Collection Time: 08/14/24  1:55 PM   Result Value Ref Range    Troponin-I 2.350 (H) 0.000 - 0.045 ng/mL   Gold Top Hold    Collection Time: 08/14/24  1:59 PM   Result Value Ref Range    Extra Tube Hold for add-ons.    Echo    Collection Time: 08/14/24  2:55 PM   Result Value Ref Range    BSA 1.54 m2    Perez's Biplane MOD Ejection Fraction 63 %    A2C EF 62 %    A4C EF 59 %    LVOT stroke volume 66.84 cm3    LVIDd 3.72 3.5 - 6.0 cm    LV Systolic Volume 27.95 mL    LV Systolic Volume Index 18.1 mL/m2    LVIDs 2.74 2.1 - 4.0 cm    LV Diastolic Volume 59.06 mL    LV ESV A4C 17.09 mL    LV Diastolic Volume Index 38.35 mL/m2    LV EDV A2C [id number] mL    LV EDV A4C 48.92 mL    Left Ventricular End Systolic Volume by Teichholz Method 27.95 mL    Left Ventricular End Diastolic Volume by Teichholz Method 59.06 mL    IVS 0.88 0.6 - 1.1 cm    LVOT diameter 1.99 cm    LVOT area 3.1 cm2    FS 26 (A) 28 - 44 %    Left Ventricle Relative Wall Thickness 0.48 cm    Posterior Wall 0.90 0.6 - 1.1 cm    LV mass 96.19 g    LV Mass Index 62 g/m2    MV Peak E Nestor 0.68 m/s    TDI LATERAL 0.11 m/s    MV Peak A Nestor 0.54 m/s    TR Max Nestor 2.16 m/s    E/A ratio 1.26     E wave deceleration time 237.59 msec    LV LATERAL E/E' RATIO 6.18 m/s    LVOT peak nestor 1.00 m/s    Left Ventricular Outflow Tract Mean Velocity 0.65 cm/s    Left Ventricular Outflow Tract Mean Gradient 1.99 mmHg    TAPSE 1.96 cm    LA size 2.80 cm    RA Major Axis 3.50 cm    AV mean gradient 3 mmHg    AV peak gradient 4 mmHg    Ao peak nestor 1.05 m/s    Ao VTI 21.70 cm    LVOT peak VTI 21.50 cm    AV valve area 3.08 cm²    AV Velocity Ratio 0.95     AV index (prosthetic) 0.99     WILBERTO by Velocity Ratio 2.96 cm²    Mr max nestor 5.16 m/s    MV mean gradient 1 mmHg    MV peak gradient 3 mmHg    MV valve  area by continuity eq 2.23 cm2    MV VTI 30.0 cm    Triscuspid Valve Regurgitation Peak Gradient 19 mmHg    ZLVIDS -0.11     ZLVIDD -1.85     LA area A4C 10.52 cm2    Mitral Valve Heart Rate 54 bpm    TV resting pulmonary artery pressure 27 mmHg    RV TB RVSP 10 mmHg    Est. RA pres 8 mmHg    IVC diameter 1.9 cm   POCT glucose    Collection Time: 08/14/24  3:29 PM   Result Value Ref Range    POCT Glucose 92 70 - 110 mg/dL   Troponin I    Collection Time: 08/14/24  5:27 PM   Result Value Ref Range    Troponin-I 2.489 (H) 0.000 - 0.045 ng/mL   Lavender Top Hold    Collection Time: 08/14/24  6:07 PM   Result Value Ref Range    Extra Tube Hold for add-ons.    EKG 12-lead    Collection Time: 08/14/24  7:55 PM   Result Value Ref Range    QRS Duration 76 ms    [facility] QTC Calculation 489 ms   Troponin I    Collection Time: 08/14/24  8:12 PM   Result Value Ref Range    Troponin-I 2.690 (H) 0.000 - 0.045 ng/mL   APTT    Collection Time: 08/14/24  8:12 PM   Result Value Ref Range    PTT 37.1 (H) 23.2 - 33.7 seconds   Protime-INR    Collection Time: 08/14/24  8:12 PM   Result Value Ref Range    PT 13.7 11.4 - 14.0 seconds    INR 1.0 <=1.3   CBC with Differential    Collection Time: 08/14/24  8:12 PM   Result Value Ref Range    WBC 9.78 4.50 - 11.50 x10(3)/mcL    RBC 4.20 4.20 - 5.40 x10(6)/mcL    Hgb 14.1 12.0 - 16.0 g/dL    Hct 40.7 37.0 - 47.0 %    MCV 96.9 (H) 80.0 - 94.0 fL    MCH 33.6 (H) 27.0 - 31.0 pg    MCHC 34.6 33.0 - 36.0 g/dL    RDW 14.5 11.5 - 17.0 %    Platelet 293 130 - 400 x10(3)/mcL    MPV 9.3 7.4 - 10.4 fL    Neut % 50.5 %    Lymph % 38.4 %    Mono % 8.2 %    Eos % 2.1 %    Basophil % 0.5 %    Lymph # 3.76 0.6 - 4.6 x10(3)/mcL    Neut # 4.93 2.1 - 9.2 x10(3)/mcL    Mono # 0.80 0.1 - 1.3 x10(3)/mcL    Eos # 0.21 0 - 0.9 x10(3)/mcL    Baso # 0.05 <=0.2 x10(3)/mcL    IG# 0.03 0 - 0.04 x10(3)/mcL    IG% 0.3 %    NRBC% 0.0 %   POCT glucose    Collection Time: 08/14/24  8:58 PM   Result Value Ref Range    POCT Glucose  120 (H) 70 - 110 mg/dL   Comprehensive Metabolic Panel (CMP)    Collection Time: 08/15/24  2:19 AM   Result Value Ref Range    Sodium 140 136 - 145 mmol/L    Potassium 3.8 3.5 - 5.1 mmol/L    Chloride 108 (H) 98 - 107 mmol/L    CO2 23 22 - 29 mmol/L    Glucose 86 74 - 100 mg/dL    Blood Urea Nitrogen 8.8 (L) 9.8 - 20.1 mg/dL    Creatinine 0.60 0.55 - 1.02 mg/dL    Calcium 8.8 8.4 - 10.2 mg/dL    Protein Total 5.5 (L) 6.4 - 8.3 gm/dL    Albumin 2.9 (L) 3.5 - 5.0 g/dL    Globulin 2.6 2.4 - 3.5 gm/dL    Albumin/Globulin Ratio 1.1 1.1 - 2.0 ratio    Bilirubin Total 0.8 <=1.5 mg/dL    ALP 98 40 - 150 unit/L    ALT 14 0 - 55 unit/L    AST 28 5 - 34 unit/L    eGFR >60 mL/min/1.73/m2    Anion Gap 9.0 mEq/L    BUN/Creatinine Ratio 15    Troponin I    Collection Time: 08/15/24  2:19 AM   Result Value Ref Range    Troponin-I 2.043 (H) 0.000 - 0.045 ng/mL   Magnesium    Collection Time: 08/15/24  2:19 AM   Result Value Ref Range    Magnesium Level 2.00 1.60 - 2.60 mg/dL   Phosphorus    Collection Time: 08/15/24  2:19 AM   Result Value Ref Range    Phosphorus Level 2.9 2.3 - 4.7 mg/dL   APTT    Collection Time: 08/15/24  2:19 AM   Result Value Ref Range    .1 (H) 23.2 - 33.7 seconds   CBC with Differential    Collection Time: 08/15/24  2:19 AM   Result Value Ref Range    WBC 9.15 4.50 - 11.50 x10(3)/mcL    RBC 4.09 (L) 4.20 - 5.40 x10(6)/mcL    Hgb 13.5 12.0 - 16.0 g/dL    Hct 39.3 37.0 - 47.0 %    MCV 96.1 (H) 80.0 - 94.0 fL    MCH 33.0 (H) 27.0 - 31.0 pg    MCHC 34.4 33.0 - 36.0 g/dL    RDW 14.4 11.5 - 17.0 %    Platelet 293 130 - 400 x10(3)/mcL    MPV 9.7 7.4 - 10.4 fL    Neut % 42.8 %    Lymph % 47.5 %    Mono % 6.6 %    Eos % 2.4 %    Basophil % 0.5 %    Lymph # 4.35 0.6 - 4.6 x10(3)/mcL    Neut # 3.91 2.1 - 9.2 x10(3)/mcL    Mono # 0.60 0.1 - 1.3 x10(3)/mcL    Eos # 0.22 0 - 0.9 x10(3)/mcL    Baso # 0.05 <=0.2 x10(3)/mcL    IG# 0.02 0 - 0.04 x10(3)/mcL    IG% 0.2 %    NRBC% 0.0 %   EKG 12-lead    Collection Time:  08/15/24  3:21 AM   Result Value Ref Range    QRS Duration 78 ms    [facility] QTC Calculation 465 ms   Drug Screen, Urine    Collection Time: 08/15/24  3:56 AM   Result Value Ref Range    Amphetamines, Urine Negative Negative    Barbiturates, Urine Negative Negative    Benzodiazepine, Urine Negative Negative    Cannabinoids, Urine Positive (A) Negative    Cocaine, Urine Negative Negative    Fentanyl, Urine Negative Negative    MDMA, Urine Negative Negative    Opiates, Urine Negative Negative    Phencyclidine, Urine Negative Negative    pH, Urine 6.5 3.0 - 11.0    Specific Gravity, Urine Auto 1.008 1.001 - 1.035   Troponin I    Collection Time: 08/15/24  8:09 AM   Result Value Ref Range    Troponin-I 1.615 (H) 0.000 - 0.045 ng/mL   EKG 12-lead    Collection Time: 08/15/24  8:12 AM   Result Value Ref Range    QRS Duration 72 ms    [facility] QTC Calculation 449 ms   APTT    Collection Time: 08/15/24 10:15 AM   Result Value Ref Range    PTT 41.3 (H) 23.2 - 33.7 seconds   Red Top Hold    Collection Time: 08/15/24 10:25 AM   Result Value Ref Range    Extra Tube Hold for add-ons.    Light Green Top Hold    Collection Time: 08/15/24 10:25 AM   Result Value Ref Range    Extra Tube Hold for add-ons.    Lavender Top Hold    Collection Time: 08/15/24 10:25 AM   Result Value Ref Range    Extra Tube Hold for add-ons.    Gold Top Hold    Collection Time: 08/15/24 10:25 AM   Result Value Ref Range    Extra Tube Hold for add-ons.    Troponin I    Collection Time: 08/15/24  2:42 PM   Result Value Ref Range    Troponin-I 1.237 (H) 0.000 - 0.045 ng/mL   Lavender Top Hold    Collection Time: 08/15/24  3:11 PM   Result Value Ref Range    Extra Tube Hold for add-ons.    APTT    Collection Time: 08/15/24  4:53 PM   Result Value Ref Range    PTT 51.3 (H) 23.2 - 33.7 seconds   Light Green Top Hold    Collection Time: 08/15/24  5:03 PM   Result Value Ref Range    Extra Tube Hold for add-ons.    POCT glucose    Collection Time: 08/15/24  8:49 PM   Result  Value Ref Range    POCT Glucose 129 (H) 70 - 110 mg/dL   APTT    Collection Time: 08/15/24 10:48 PM   Result Value Ref Range    PTT 52.8 (H) 23.2 - 33.7 seconds   Light Green Top Hold    Collection Time: 08/15/24 10:58 PM   Result Value Ref Range    Extra Tube Hold for add-ons.    Comprehensive Metabolic Panel (CMP)    Collection Time: 08/16/24  7:03 AM   Result Value Ref Range    Sodium 141 136 - 145 mmol/L    Potassium 3.7 3.5 - 5.1 mmol/L    Chloride 110 (H) 98 - 107 mmol/L    CO2 23 22 - 29 mmol/L    Glucose 105 (H) 74 - 100 mg/dL    Blood Urea Nitrogen 10.9 9.8 - 20.1 mg/dL    Creatinine 0.59 0.55 - 1.02 mg/dL    Calcium 8.6 8.4 - 10.2 mg/dL    Protein Total 5.5 (L) 6.4 - 8.3 gm/dL    Albumin 2.9 (L) 3.5 - 5.0 g/dL    Globulin 2.6 2.4 - 3.5 gm/dL    Albumin/Globulin Ratio 1.1 1.1 - 2.0 ratio    Bilirubin Total 0.3 <=1.5 mg/dL    ALP 91 40 - 150 unit/L    ALT 23 0 - 55 unit/L    AST 42 (H) 5 - 34 unit/L    eGFR >60 mL/min/1.73/m2    Anion Gap 8.0 mEq/L    BUN/Creatinine Ratio 18    Magnesium    Collection Time: 08/16/24  7:03 AM   Result Value Ref Range    Magnesium Level 1.70 1.60 - 2.60 mg/dL   Phosphorus    Collection Time: 08/16/24  7:03 AM   Result Value Ref Range    Phosphorus Level 3.8 2.3 - 4.7 mg/dL   APTT    Collection Time: 08/16/24  7:03 AM   Result Value Ref Range    PTT 55.7 (H) 23.2 - 33.7 seconds   Red Top Hold    Collection Time: 08/16/24  7:03 AM   Result Value Ref Range    Extra Tube Hold for add-ons.    Gold Top Hold    Collection Time: 08/16/24  7:03 AM   Result Value Ref Range    Extra Tube Hold for add-ons.    Pink Top Hold    Collection Time: 08/16/24  7:03 AM   Result Value Ref Range    Extra Tube Hold for add-ons.    POCT glucose    Collection Time: 08/16/24  8:31 PM   Result Value Ref Range    POCT Glucose 131 (H) 70 - 110 mg/dL   PTT Heparin Monitoring    Collection Time: 08/16/24 10:06 PM   Result Value Ref Range    PTT Heparin Monitor 46.8 (H) 23.2 - 33.7 seconds   Urinalysis     Collection Time: 08/16/24 10:17 PM   Result Value Ref Range    Color, UA Colorless Yellow, Light-Yellow, Colorless, Straw, Dark-Yellow    Appearance, UA Clear Clear    Specific Gravity, UA 1.015 1.005 - 1.030    pH, UA 7.5 5.0 - 8.5    Protein, UA Negative Negative    Glucose, UA Normal Negative, Normal    Ketones, UA Negative Negative    Blood, UA Negative Negative    Bilirubin, UA Negative Negative    Urobilinogen, UA Normal 0.2, 1.0, Normal    Nitrites, UA Negative Negative    Leukocyte Esterase, UA Negative Negative    RBC, UA 0-5 None Seen, 0-2, 3-5, 0-5 /HPF    WBC, UA 0-5 None Seen, 0-2, 3-5, 0-5 /HPF    Bacteria, UA None Seen None Seen, Trace /HPF    Squamous Epithelial Cells, UA Trace None Seen /HPF   Protime-INR    Collection Time: 08/17/24  5:11 AM   Result Value Ref Range    PT 13.0 12.5 - 14.5 seconds    INR 1.0 <=1.3   Comprehensive Metabolic Panel    Collection Time: 08/17/24  5:11 AM   Result Value Ref Range    Sodium 139 136 - 145 mmol/L    Potassium 4.0 3.5 - 5.1 mmol/L    Chloride 108 (H) 98 - 107 mmol/L    CO2 22 22 - 29 mmol/L    Glucose 94 74 - 100 mg/dL    Blood Urea Nitrogen 6.6 (L) 9.8 - 20.1 mg/dL    Creatinine 0.60 0.55 - 1.02 mg/dL    Calcium 8.6 8.4 - 10.2 mg/dL    Protein Total 5.7 (L) 6.4 - 8.3 gm/dL    Albumin 3.2 (L) 3.5 - 5.0 g/dL    Globulin 2.5 2.4 - 3.5 gm/dL    Albumin/Globulin Ratio 1.3 1.1 - 2.0 ratio    Bilirubin Total 0.4 <=1.5 mg/dL     40 - 150 unit/L    ALT 30 0 - 55 unit/L    AST 44 (H) 5 - 34 unit/L    eGFR >60 mL/min/1.73/m2    Anion Gap 9.0 mEq/L    BUN/Creatinine Ratio 11    PTT Heparin Monitoring    Collection Time: 08/17/24  5:11 AM   Result Value Ref Range    PTT Heparin Monitor 130.1 (H) 23.2 - 33.7 seconds   CBC with Differential    Collection Time: 08/17/24  5:11 AM   Result Value Ref Range    WBC 10.27 4.50 - 11.50 x10(3)/mcL    RBC 3.63 (L) 4.20 - 5.40 x10(6)/mcL    Hgb 12.1 12.0 - 16.0 g/dL    Hct 36.1 (L) 37.0 - 47.0 %    MCV 99.4 (H) 80.0 - 94.0 fL     MCH 33.3 (H) 27.0 - 31.0 pg    MCHC 33.5 33.0 - 36.0 g/dL    RDW 14.6 11.5 - 17.0 %    Platelet 250 130 - 400 x10(3)/mcL    MPV 10.6 (H) 7.4 - 10.4 fL    Neut % 57.7 %    Lymph % 32.3 %    Mono % 6.0 %    Eos % 3.0 %    Basophil % 0.6 %    Lymph # 3.32 0.6 - 4.6 x10(3)/mcL    Neut # 5.92 2.1 - 9.2 x10(3)/mcL    Mono # 0.62 0.1 - 1.3 x10(3)/mcL    Eos # 0.31 0 - 0.9 x10(3)/mcL    Baso # 0.06 <=0.2 x10(3)/mcL    IG# 0.04 0 - 0.04 x10(3)/mcL    IG% 0.4 %    NRBC% 0.0 %   PTT Heparin Monitoring    Collection Time: 08/17/24  4:32 PM   Result Value Ref Range    PTT Heparin Monitor 50.4 (H) 23.2 - 33.7 seconds   PTT Heparin Monitoring    Collection Time: 08/17/24  4:32 PM   Result Value Ref Range    PTT Heparin Monitor 50.2 (H) 23.2 - 33.7 seconds   PTT Heparin Monitoring    Collection Time: 08/18/24 12:18 AM   Result Value Ref Range    PTT Heparin Monitor 60.9 (H) 23.2 - 33.7 seconds   APTT    Collection Time: 08/18/24  7:06 AM   Result Value Ref Range    PTT 73.5 (H) 23.2 - 33.7 seconds   CBC with Differential    Collection Time: 08/18/24  7:06 AM   Result Value Ref Range    WBC 12.39 (H) 4.50 - 11.50 x10(3)/mcL    RBC 3.81 (L) 4.20 - 5.40 x10(6)/mcL    Hgb 12.6 12.0 - 16.0 g/dL    Hct 37.0 37.0 - 47.0 %    MCV 97.1 (H) 80.0 - 94.0 fL    MCH 33.1 (H) 27.0 - 31.0 pg    MCHC 34.1 33.0 - 36.0 g/dL    RDW 14.4 11.5 - 17.0 %    Platelet 260 130 - 400 x10(3)/mcL    MPV 10.1 7.4 - 10.4 fL    Neut % 71.5 %    Lymph % 14.7 %    Mono % 9.9 %    Eos % 2.7 %    Basophil % 0.4 %    Lymph # 1.82 0.6 - 4.6 x10(3)/mcL    Neut # 8.86 2.1 - 9.2 x10(3)/mcL    Mono # 1.23 0.1 - 1.3 x10(3)/mcL    Eos # 0.33 0 - 0.9 x10(3)/mcL    Baso # 0.05 <=0.2 x10(3)/mcL    IG# 0.10 (H) 0 - 0.04 x10(3)/mcL    IG% 0.8 %    NRBC% 0.0 %   PTT Heparin Monitoring    Collection Time: 08/18/24  7:06 AM   Result Value Ref Range    PTT Heparin Monitor 73.4 (H) 23.2 - 33.7 seconds   PTT Heparin Monitoring    Collection Time: 08/18/24 12:42 PM   Result Value Ref  Range    PTT Heparin Monitor 77.6 (H) 23.2 - 33.7 seconds   EKG 12-lead    Collection Time: 08/19/24  4:02 AM   Result Value Ref Range    QRS Duration 72 ms    [facility] QTC Calculation 452 ms   APTT    Collection Time: 08/19/24  4:23 AM   Result Value Ref Range    PTT 82.7 (H) 23.2 - 33.7 seconds   Comprehensive Metabolic Panel    Collection Time: 08/19/24  4:23 AM   Result Value Ref Range    Sodium 136 136 - 145 mmol/L    Potassium 4.1 3.5 - 5.1 mmol/L    Chloride 105 98 - 107 mmol/L    CO2 21 (L) 22 - 29 mmol/L    Glucose 106 (H) 74 - 100 mg/dL    Blood Urea Nitrogen 7.4 (L) 9.8 - 20.1 mg/dL    Creatinine 0.69 0.55 - 1.02 mg/dL    Calcium 8.6 8.4 - 10.2 mg/dL    Protein Total 6.0 (L) 6.4 - 8.3 gm/dL    Albumin 3.2 (L) 3.5 - 5.0 g/dL    Globulin 2.8 2.4 - 3.5 gm/dL    Albumin/Globulin Ratio 1.1 1.1 - 2.0 ratio    Bilirubin Total 0.4 <=1.5 mg/dL     40 - 150 unit/L    ALT 36 0 - 55 unit/L    AST 30 5 - 34 unit/L    eGFR >60 mL/min/1.73/m2    Anion Gap 10.0 mEq/L    BUN/Creatinine Ratio 11    Magnesium    Collection Time: 08/19/24  4:23 AM   Result Value Ref Range    Magnesium Level 2.00 1.60 - 2.60 mg/dL   CBC with Differential    Collection Time: 08/19/24  4:23 AM   Result Value Ref Range    WBC 11.15 4.50 - 11.50 x10(3)/mcL    RBC 3.69 (L) 4.20 - 5.40 x10(6)/mcL    Hgb 12.3 12.0 - 16.0 g/dL    Hct 36.2 (L) 37.0 - 47.0 %    MCV 98.1 (H) 80.0 - 94.0 fL    MCH 33.3 (H) 27.0 - 31.0 pg    MCHC 34.0 33.0 - 36.0 g/dL    RDW 14.7 11.5 - 17.0 %    Platelet 277 130 - 400 x10(3)/mcL    MPV 10.4 7.4 - 10.4 fL    Neut % 58.9 %    Lymph % 24.9 %    Mono % 11.1 %    Eos % 3.4 %    Basophil % 0.8 %    Lymph # 2.78 0.6 - 4.6 x10(3)/mcL    Neut # 6.56 2.1 - 9.2 x10(3)/mcL    Mono # 1.24 0.1 - 1.3 x10(3)/mcL    Eos # 0.38 0 - 0.9 x10(3)/mcL    Baso # 0.09 <=0.2 x10(3)/mcL    IG# 0.10 (H) 0 - 0.04 x10(3)/mcL    IG% 0.9 %    NRBC% 0.0 %   EKG 12-LEAD on arrival to floor    Collection Time: 08/19/24  2:46 PM   Result Value Ref Range     QRS Duration 72 ms    [facility] QTC Calculation 441 ms   EKG 12-lead    Collection Time: 08/20/24  4:31 AM   Result Value Ref Range    QRS Duration 74 ms    [facility] QTC Calculation 442 ms   Comprehensive Metabolic Panel    Collection Time: 08/20/24  4:37 AM   Result Value Ref Range    Sodium 134 (L) 136 - 145 mmol/L    Potassium 4.2 3.5 - 5.1 mmol/L    Chloride 105 98 - 107 mmol/L    CO2 19 (L) 22 - 29 mmol/L    Glucose 101 (H) 74 - 100 mg/dL    Blood Urea Nitrogen 6.9 (L) 9.8 - 20.1 mg/dL    Creatinine 0.64 0.55 - 1.02 mg/dL    Calcium 8.9 8.4 - 10.2 mg/dL    Protein Total 6.4 6.4 - 8.3 gm/dL    Albumin 3.2 (L) 3.5 - 5.0 g/dL    Globulin 3.2 2.4 - 3.5 gm/dL    Albumin/Globulin Ratio 1.0 (L) 1.1 - 2.0 ratio    Bilirubin Total 0.4 <=1.5 mg/dL     40 - 150 unit/L    ALT 29 0 - 55 unit/L    AST 22 5 - 34 unit/L    eGFR >60 mL/min/1.73/m2    Anion Gap 10.0 mEq/L    BUN/Creatinine Ratio 11    Magnesium    Collection Time: 08/20/24  4:37 AM   Result Value Ref Range    Magnesium Level 2.20 1.60 - 2.60 mg/dL   CBC with Differential    Collection Time: 08/20/24  4:37 AM   Result Value Ref Range    WBC 11.54 (H) 4.50 - 11.50 x10(3)/mcL    RBC 3.92 (L) 4.20 - 5.40 x10(6)/mcL    Hgb 12.9 12.0 - 16.0 g/dL    Hct 39.0 37.0 - 47.0 %    MCV 99.5 (H) 80.0 - 94.0 fL    MCH 32.9 (H) 27.0 - 31.0 pg    MCHC 33.1 33.0 - 36.0 g/dL    RDW 14.6 11.5 - 17.0 %    Platelet 315 130 - 400 x10(3)/mcL    MPV 10.0 7.4 - 10.4 fL    Neut % 61.8 %    Lymph % 19.2 %    Mono % 13.5 %    Eos % 3.7 %    Basophil % 0.8 %    Lymph # 2.21 0.6 - 4.6 x10(3)/mcL    Neut # 7.14 2.1 - 9.2 x10(3)/mcL    Mono # 1.56 (H) 0.1 - 1.3 x10(3)/mcL    Eos # 0.43 0 - 0.9 x10(3)/mcL    Baso # 0.09 <=0.2 x10(3)/mcL    IG# 0.11 (H) 0 - 0.04 x10(3)/mcL    IG% 1.0 %    NRBC% 0.0 %     Imaging Results:     Narrative & Impression  EXAMINATION:  US ABDOMEN LIMITED_LIVER     CLINICAL HISTORY:  Transaminitis;, Abnormal levels of other serum enzymes.     TECHNIQUE:  Transverse and  longitudinal images of the right upper abdomen were obtained.     COMPARISON:  None     FINDINGS:  Liver:     Size: 17.1 cm in the right midclavicular line, normal     Appearance: There is some heterogenicity of the liver parenchyma with slight increased echogenicity in increased echogenicity decreases the sensitivity to detect focal lesions     Mass: No focal masses     Gallbladder:     Stones/Sludge: None     Appearance: No wall thickening, pericholecystic fluid or hydrops     Sonographic Cabrera's Sign: Negative     Bile Ducts:     Intrahepatic Ducts: No dilatation     Extrahepatic Ducts: Common bile duct measures 0.55 cm, no dilatation     Pancreas:     Visualized portions of the pancreas are normal.     Right Kidney:     Size: 10.3 cm in length     Echogenicity: Normal     Collecting System: No hydronephrosis     Stone: None     Cyst/Mass: None     Vessels:     Aorta: Visualized portions are normal.     Inferior Vena Cava: Visualized portions are normal.     Main Portal Vein: Patent with hepatopedal  flow.     Free Fluid:     No ascites or pleural effusions     Impression:     Slight heterogenicity to the liver parenchyma with increased echogenicity suggestive of hepatic steatosis.     No other significant abnormality.      Electronically signed by:Saw Sesay  Date:                                            04/17/2024  Time:                                           08:34    Assessment/Plan:     Problem List Items Addressed This Visit          GI    Hepatic steatosis - Primary     Per review of laboratory results, LFTs normal August 20, 2024, albumin 3.2, platelets 315.    Laboratory results April 15, 2024 revealed , , alkaline phosphatase and total bilirubin within normal limits; acute viral hepatitis panel negative.    She reports that she broke her ankle around this time and was taking a lot of OTC Tylenol and ibuprofen, as well as drinking a half pint of whiskey daily.  Abdominal  ultrasound April 17, 2024 revealed slight heterogenicity to the liver parenchyma with increased echogenicity suggestive of hepatic steatosis and no other significant abnormality noted.  Lifestyle and dietary modifications provided  Recommend good control of comorbidities  Recommend alcohol cessation  Will proceed with further workup for elevated LFTs (see below)  Abdominal ultrasound in 1 month   FibroScan   Call with updates  Follow-up clinic visit with NP in 6 months         Relevant Orders    Actin (Smooth Muscle) Antibody (IgG)    Alpha-1-Antitrypsin    Anti-Liver, Kidney, Microsome Ab    Antimitochondrial Antibody    ANTINUCLEAR ANTIBODIES    APTT    Ceruloplasmin    Ferritin    US Abdomen Limited    Tissue Transglutaminase, IgA    Protime-INR    Iron and TIBC    US Elastography Liver w/imaging    Screening for colon cancer     Cologuard testing ordered         Relevant Orders    Cologuard Screening (Multitarget Stool DNA)       Other    Tobacco user     Recommend tobacco cessation.         Relevant Orders    Ambulatory referral/consult to Smoking Cessation Program

## 2024-09-10 ENCOUNTER — OFFICE VISIT (OUTPATIENT)
Dept: INTERNAL MEDICINE | Facility: CLINIC | Age: 54
End: 2024-09-10
Payer: MEDICAID

## 2024-09-10 VITALS
HEART RATE: 78 BPM | HEIGHT: 62 IN | SYSTOLIC BLOOD PRESSURE: 119 MMHG | DIASTOLIC BLOOD PRESSURE: 87 MMHG | WEIGHT: 119.81 LBS | OXYGEN SATURATION: 98 % | TEMPERATURE: 98 F | RESPIRATION RATE: 18 BRPM | BODY MASS INDEX: 22.05 KG/M2

## 2024-09-10 DIAGNOSIS — N28.1 RENAL CYST: ICD-10-CM

## 2024-09-10 DIAGNOSIS — Z82.61 FAMILY HISTORY OF RHEUMATOID ARTHRITIS: ICD-10-CM

## 2024-09-10 DIAGNOSIS — M51.36 BULGING LUMBAR DISC: ICD-10-CM

## 2024-09-10 DIAGNOSIS — F17.210 CIGARETTE NICOTINE DEPENDENCE WITHOUT COMPLICATION: ICD-10-CM

## 2024-09-10 DIAGNOSIS — I21.4 NSTEMI (NON-ST ELEVATED MYOCARDIAL INFARCTION): Primary | ICD-10-CM

## 2024-09-10 DIAGNOSIS — M41.54 OTHER SECONDARY SCOLIOSIS, THORACIC REGION: ICD-10-CM

## 2024-09-10 DIAGNOSIS — S32.040D COMPRESSION FRACTURE OF L4 VERTEBRA WITH ROUTINE HEALING: ICD-10-CM

## 2024-09-10 DIAGNOSIS — M51.36 LUMBAR DEGENERATIVE DISC DISEASE: ICD-10-CM

## 2024-09-10 DIAGNOSIS — I25.10 CORONARY ARTERY DISEASE INVOLVING NATIVE CORONARY ARTERY OF NATIVE HEART WITHOUT ANGINA PECTORIS: ICD-10-CM

## 2024-09-10 LAB
A1AT SERPL NEPH-MCNC: 155 MG/DL (ref 100–190)
ANTINUCLEAR ANTIBODY SCREEN (OHS): NEGATIVE
CERULOPLASMIN SERPL-MCNC: 32.1 MG/DL (ref 20–51)
DSDNA AB QUANT (OHS): <0.6 IU/ML
MITOCHONDRIA M2 AB SER IA-ACNC: <0.1 U
TTG IGA SER IA-ACNC: <1.02 FLU

## 2024-09-10 PROCEDURE — 3044F HG A1C LEVEL LT 7.0%: CPT | Mod: CPTII,,, | Performed by: NURSE PRACTITIONER

## 2024-09-10 PROCEDURE — 3074F SYST BP LT 130 MM HG: CPT | Mod: CPTII,,, | Performed by: NURSE PRACTITIONER

## 2024-09-10 PROCEDURE — 99406 BEHAV CHNG SMOKING 3-10 MIN: CPT | Mod: S$PBB,,, | Performed by: NURSE PRACTITIONER

## 2024-09-10 PROCEDURE — 99215 OFFICE O/P EST HI 40 MIN: CPT | Mod: 25,S$PBB,, | Performed by: NURSE PRACTITIONER

## 2024-09-10 PROCEDURE — 99214 OFFICE O/P EST MOD 30 MIN: CPT | Mod: PBBFAC | Performed by: NURSE PRACTITIONER

## 2024-09-10 PROCEDURE — 3008F BODY MASS INDEX DOCD: CPT | Mod: CPTII,,, | Performed by: NURSE PRACTITIONER

## 2024-09-10 PROCEDURE — 1160F RVW MEDS BY RX/DR IN RCRD: CPT | Mod: CPTII,,, | Performed by: NURSE PRACTITIONER

## 2024-09-10 PROCEDURE — 4010F ACE/ARB THERAPY RXD/TAKEN: CPT | Mod: CPTII,,, | Performed by: NURSE PRACTITIONER

## 2024-09-10 PROCEDURE — 1159F MED LIST DOCD IN RCRD: CPT | Mod: CPTII,,, | Performed by: NURSE PRACTITIONER

## 2024-09-10 PROCEDURE — 1111F DSCHRG MED/CURRENT MED MERGE: CPT | Mod: CPTII,,, | Performed by: NURSE PRACTITIONER

## 2024-09-10 PROCEDURE — 3079F DIAST BP 80-89 MM HG: CPT | Mod: CPTII,,, | Performed by: NURSE PRACTITIONER

## 2024-09-10 RX ORDER — PANTOPRAZOLE SODIUM 40 MG/1
40 TABLET, DELAYED RELEASE ORAL DAILY
Qty: 90 TABLET | Refills: 0 | Status: SHIPPED | OUTPATIENT
Start: 2024-09-10

## 2024-09-10 NOTE — PATIENT INSTRUCTIONS
Scheduling department [phone] to cancel echo and stress test   Keep appointment for kidney ultrasound 9/24/24

## 2024-09-11 LAB — LKM-1 IGG SER IA-ACNC: 0.6 U

## 2024-09-17 PROBLEM — M51.36 BULGING LUMBAR DISC: Status: ACTIVE | Noted: 2024-09-17

## 2024-09-17 PROBLEM — M51.369 BULGING LUMBAR DISC: Status: ACTIVE | Noted: 2024-09-17

## 2024-09-18 NOTE — PROGRESS NOTES
Emma L Rona, NP   [facility]  [address] [facility] [address]      PATIENT NAME: Gilma Edmonds  : 1970  DATE: 9/10/24  MRN: [id number]        History of Present Illness / Problem Focused Workflow     Gilma Edmonds presents to the clinic with Post juan visit (Denies any chest pain , SOB)     Pt presenting for post juan follow up visit. She presented to ER on 24 for persistent CP with left arm numbness and pain radiating to b/l shoulders and back. Described pain as tightness. Exacerbated by walking and activity. She admitted to drinking 1 oz of alcohol that morning which alleviated the pain some. She was mildly tachycardic upon arrival to ER with /81. Troponin elevated 0.819 and increased to 1.805 three hours later. BNP normal. EKG showed normal sinus rhythm with some T wave inversions in leads III and aVF. Troponin peaked to 2.69. TTE 24 showed normal LVEF with no obvious wall motion abnormalities. ASA and Plavix given. Cardiology consulted for [facility] and performed 24 showing significant CAD and transferred to [facility] for CABG evaluation. PTCA to RCA x 2 stents. She had f/u with Cardiology Dr. Abraham with [facility]. She reports compliance with medications per med list. She is feeling much better though still has some fatigue. Denies any CP or SOB.   Reviewed CT and DEXA results. Denies use of alcohol. Trying to quit smoking.    Review of Systems     Review of Systems   Constitutional:  Positive for fatigue.   HENT: Negative.     Eyes: Negative.    Respiratory: Negative.     Cardiovascular: Negative.    Gastrointestinal: Negative.    Endocrine: Negative.    Genitourinary: Negative.    Musculoskeletal:  Positive for arthralgias and back pain.   Skin: Negative.    Allergic/Immunologic: Negative.    Neurological: Negative.    Hematological: Negative.    Psychiatric/Behavioral: Negative.         Medical / Social / Family History  "    -------------------------------------    Arthritis    Coronary artery disease involving native coronary artery of native heart without angina pectoris    NSTEMI (non-ST elevated myocardial infarction)        Past Surgical History:   Procedure Laterality Date    ANGIOGRAM, CORONARY, WITH LEFT HEART CATHETERIZATION N/A 2024    Procedure: Angiogram, Coronary, with Left Heart Cath;  Surgeon: Jared Cassidy MD;  Location: [facility] CATH LAB;  Service: Cardiology;  Laterality: N/A;    ANGIOGRAM, CORONARY, WITH LEFT HEART CATHETERIZATION N/A 2024    Procedure: Angiogram, Coronary, with Left Heart Cath;  Surgeon: Jared Abraham Jr., MD;  Location: [facility] CATH LAB;  Service: Cardiology;  Laterality: N/A;    ANKLE HARDWARE REMOVAL Left 2024    Procedure: REMOVAL, HARDWARE, ANKLE;  Surgeon: Jared Ibanez MD;  Location: [facility];  Service: Orthopedics;  Laterality: Left;     SECTION      1    left ankle surgery      OPEN REDUCTION AND INTERNAL FIXATION (ORIF) OF PILON FRACTURE Left 2024    Procedure: ORIF, FRACTURE, PILON;  Surgeon: Jared Ibanez MD;  Location: [facility];  Service: Orthopedics;  Laterality: Left;  Open "Broken" Schukla set/hold Screw Removal set    PERCUTANEOUS CORONARY INTERVENTION, ARTERY N/A 2024    Procedure: Percutaneous coronary intervention;  Surgeon: Jared Cassidy MD;  Location: [facility] CATH LAB;  Service: Cardiology;  Laterality: N/A;    Right habd and wrist scope         Social History     Socioeconomic History    Marital status: Single    Number of children: 2    Highest education level: High school graduate   Occupational History     Comment: unemployed   Tobacco Use    Smoking status: Some Days     Current packs/day: 1.00     Average packs/day: 0.5 packs/day for 35.4 years (18.6 ttl pk-yrs)     Types: Cigarettes     Start date: 1989    Smokeless tobacco: Never    Tobacco comments:     Smokes about 2 cigarettes a day. Using the patches   Substance " and Sexual Activity    Alcohol use: Not Currently     Alcohol/week: 1.0 standard drink of alcohol     Types: 1 Glasses of wine per week     Comment: former daily alcohol drinker x 1 month, stopped in april 2024    Drug use: Not Currently     Social Determinants of Health     Financial Resource Strain: Low Risk  (8/15/2024)    Overall Financial Resource Strain (CARDIA)     Difficulty of Paying Living Expenses: Not very hard   Food Insecurity: No Food Insecurity (8/19/2024)    Hunger Vital Sign     Worried About Running Out of Food in the Last Year: Never true     Ran Out of Food in the Last Year: Never true   Transportation Needs: No Transportation Needs (8/19/2024)    TRANSPORTATION NEEDS     Transportation : No   Physical Activity: Inactive (8/15/2024)    Exercise Vital Sign     Days of Exercise per Week: 0 days     Minutes of Exercise per Session: 0 min   Stress: No Stress Concern Present (8/15/2024)    [employer] [place] [employer] - Occupational Stress Questionnaire     Feeling of Stress : Not at all   Housing Stability: Low Risk  (8/19/2024)    Housing Stability Vital Sign     Unable to Pay for Housing in the Last Year: No     Homeless in the Last Year: No        Family History   Problem Relation Name Age of Onset    Lung cancer Mother      Rheum arthritis Father      Diabetes Maternal Grandmother      Rheum arthritis Paternal Grandmother      Rheum arthritis Paternal Great-Grandmother          Medications and Allergies     Medications  Current Outpatient Medications   Medication Instructions    aspirin 81 mg, Oral, 2 times daily    carvediloL (COREG) 3.125 mg, Oral, 2 times daily    gabapentin (NEURONTIN) 300 mg, Oral, 3 times daily    HYDROcodone-acetaminophen (NORCO) 5-325 mg per tablet 1 tablet, Oral, Every 12 hours PRN    methocarbamoL (ROBAXIN) 750 mg, Oral, 4 times daily, Not taking<BR>    nicotine (NICODERM CQ) 21 mg/24 hr 1 patch, Transdermal, Daily    pantoprazole (PROTONIX) 40 mg, Oral,  "Daily    rosuvastatin (CRESTOR) 40 mg, Oral, Nightly    ticagrelor (BRILINTA) 90 mg, Oral, 2 times daily    valsartan (DIOVAN) 80 mg, Oral, Daily       Allergies  Review of patient's allergies indicates:   Allergen Reactions    Meloxicam Swelling    Sulfa (sulfonamide antibiotics) Hives     On tongue    Sulfamethoxazole-trimethoprim Hives     On tongue       Physical Examination     Visit Vitals  /87 (BP Location: Left arm, Patient Position: Sitting, BP Method: Medium (Automatic))   Pulse 78   Temp 97.8 °F (36.6 °C) (Oral)   Resp 18   Ht 5' 2.01" (1.575 m)   Wt 54.3 kg (119 lb 12.8 oz)   SpO2 98%   BMI 21.91 kg/m²       Physical Exam  Constitutional:       General: She is not in acute distress.     Appearance: Normal appearance. She is not ill-appearing or diaphoretic.   HENT:      Head: Normocephalic.   Cardiovascular:      Rate and Rhythm: Normal rate and regular rhythm.      Heart sounds: No murmur heard.  Pulmonary:      Effort: Pulmonary effort is normal. No respiratory distress.      Breath sounds: Normal breath sounds. No stridor. No wheezing, rhonchi or rales.   Skin:     General: Skin is warm and dry.   Neurological:      Mental Status: She is alert and oriented to person, place, and time. Mental status is at baseline.      Coordination: Coordination normal.      Gait: Gait normal.   Psychiatric:         Mood and Affect: Mood normal.         Behavior: Behavior normal.         Thought Content: Thought content normal.         Judgment: Judgment normal.           Results     Lab Results   Component Value Date    WBC 11.54 (H) 08/20/2024    RBC 3.92 (L) 08/20/2024    HGB 12.9 08/20/2024    HCT 39.0 08/20/2024    MCV 99.5 (H) 08/20/2024    MCH 32.9 (H) 08/20/2024    MCHC 33.1 08/20/2024    RDW 14.6 08/20/2024     08/20/2024    MPV 10.0 08/20/2024     Sodium   Date Value Ref Range Status   08/20/2024 134 (L) 136 - 145 mmol/L Final     Potassium   Date Value Ref Range Status   08/20/2024 4.2 3.5 - 5.1 " "mmol/L Final     Chloride   Date Value Ref Range Status   08/20/2024 105 98 - 107 mmol/L Final     CO2   Date Value Ref Range Status   08/20/2024 19 (L) 22 - 29 mmol/L Final     Blood Urea Nitrogen   Date Value Ref Range Status   08/20/2024 6.9 (L) 9.8 - 20.1 mg/dL Final     Creatinine   Date Value Ref Range Status   08/20/2024 0.64 0.55 - 1.02 mg/dL Final     Calcium   Date Value Ref Range Status   08/20/2024 8.9 8.4 - 10.2 mg/dL Final     Albumin   Date Value Ref Range Status   08/20/2024 3.2 (L) 3.5 - 5.0 g/dL Final     Bilirubin Total   Date Value Ref Range Status   08/20/2024 0.4 <=1.5 mg/dL Final     ALP   Date Value Ref Range Status   08/20/2024 117 40 - 150 unit/L Final     AST   Date Value Ref Range Status   08/20/2024 22 5 - 34 unit/L Final     ALT   Date Value Ref Range Status   08/20/2024 29 0 - 55 unit/L Final     Lab Results   Component Value Date    CHOL 158 07/19/2024     Lab Results   Component Value Date    HDL 56 07/19/2024     Lab Results   Component Value Date    TRIG 95 07/19/2024     Lab Results   Component Value Date    LDL 83.00 07/19/2024     Lab Results   Component Value Date    TSH 1.426 07/19/2024     Lab Results   Component Value Date    PHUR 7.5 08/16/2024    PROTEINUA Negative 08/16/2024    GLUCUA Normal 08/16/2024    OCCULTUA Negative 08/16/2024    NITRITE Negative 08/16/2024    LEUKOCYTESUR Negative 08/16/2024     Lab Results   Component Value Date    HGBA1C 4.9 07/19/2024     No results found for: "MICALBCREAT"     Assessment       ICD-10-CM ICD-9-CM   1. NSTEMI (non-ST elevated myocardial infarction)  I21.4 410.70   2. Other secondary scoliosis, thoracic region  M41.54 737.43   3. Lumbar degenerative disc disease  M51.36 722.52   4. Compression fracture of L4 vertebra with routine healing  S32.040D V54.17   5. Coronary artery disease involving native coronary artery of native heart without angina pectoris  I25.10 414.01   6. Family history of rheumatoid arthritis  Z82.61 V17.7 "   7. Cigarette nicotine dependence without complication  F17.210 305.1   8. Renal cyst  N28.1 753.10   9. Bulging lumbar disc  M51.36 722.52       Plan       Problem List Items Addressed This Visit          Neuro    Bulging lumbar disc    Compression fracture of L4 vertebra with routine healing    Current Assessment & Plan     See DEXA results and CT lumbar spine.          Lumbar degenerative disc disease    Current Assessment & Plan     Refer to CT lumbar spine results  Will hold on therapy at this time due to continued recovery from NSTEMI and stent placement           Other secondary scoliosis, thoracic region       Cardiac/Vascular    Coronary artery disease involving native coronary artery of native heart without angina pectoris    Current Assessment & Plan     Pt admitted with NSTEMI and [facility] revealed MVCAD  Underwent PTCA to RCA x 2 stents   Doing well  Compliant with meds per med list  Had f/u with cardiologist, Dr. Abraham with [facility]  Encouraged continued weaning/ avoid alcohol, cessation of tobacco abuse  ER precautions advised         NSTEMI (non-ST elevated myocardial infarction) - Primary    Current Assessment & Plan     Pt admitted for NSTEMI 8/14/24- see CAD            Renal/    Renal cyst    Current Assessment & Plan     Noted per CT lumbar spine  Renal US ordered/ scheduled            Immunology/Multi System    Family history of rheumatoid arthritis    Current Assessment & Plan     Neg VINAYAK            Other    Cigarette nicotine dependence without complication    Current Assessment & Plan     Less, 0.5 ppd  Discussed for at least 3 minutes importance of smoking cessation and health benefits, including adverse effects to patient's health and organs.  Encouraged cessation.  Per requirements, pt does not meet requirements for LDCT screening approved per insurance            Future Appointments   Date Time Provider Department Center   9/24/2024  8:30 AM [address] [place]    9/30/2024 11:50 AM  RESIDENT 2, [facility] ORTHOPEDICS [facility] ORTHO [place]   10/4/2024  8:30 AM [facility] US2 [facility] US [place]    10/30/2024 11:00 AM FIBROSCAN, [facility] GASTROENTEROLOGY [facility] GASTRO [place]   12/10/2024  8:40 AM Emma Rea NP [facility] INTMED [place]    3/10/2025 11:00 AM Emilee Tran, SHAILAP [facility] GASTRO [place]      I spent a total of 42 minutes on the day of the visit.  This includes face to face time and non-face to face time preparing to see the patient (eg, review of tests), obtaining and/or reviewing separately obtained history, documenting clinical information in the electronic or other health record, independently interpreting results and communicating results to the patient/family/caregiver, or care coordinator.    Follow up in about 3 months (around 12/10/2024), or if symptoms worsen or fail to improve, for back pain with labs before visit.    Signature:  Emma Rea NP  [facility]  [address] [address]    Date of encounter: 9/10/24

## 2024-09-18 NOTE — ASSESSMENT & PLAN NOTE
Pt admitted with NSTEMI and [facility] revealed MVCAD  Underwent PTCA to RCA x 2 stents   Doing well  Compliant with meds per med list  Had f/u with cardiologist, Dr. Abraham with [facility]  Encouraged continued weaning/ avoid alcohol, cessation of tobacco abuse  ER precautions advised

## 2024-09-18 NOTE — ASSESSMENT & PLAN NOTE
Less, 0.5 ppd  Discussed for at least 3 minutes importance of smoking cessation and health benefits, including adverse effects to patient's health and organs.  Encouraged cessation.  Per requirements, pt does not meet requirements for LDCT screening approved per insurance

## 2024-09-18 NOTE — ASSESSMENT & PLAN NOTE
Refer to CT lumbar spine results  Will hold on therapy at this time due to continued recovery from NSTEMI and stent placement

## 2024-09-30 ENCOUNTER — OFFICE VISIT (OUTPATIENT)
Dept: ORTHOPEDICS | Facility: CLINIC | Age: 54
End: 2024-09-30
Payer: MEDICAID

## 2024-09-30 VITALS
BODY MASS INDEX: 22.25 KG/M2 | SYSTOLIC BLOOD PRESSURE: 101 MMHG | HEART RATE: 78 BPM | DIASTOLIC BLOOD PRESSURE: 67 MMHG | OXYGEN SATURATION: 99 % | WEIGHT: 121.69 LBS

## 2024-09-30 DIAGNOSIS — S82.842K CLOSED BIMALLEOLAR FRACTURE OF LEFT ANKLE WITH NONUNION, SUBSEQUENT ENCOUNTER: Primary | ICD-10-CM

## 2024-09-30 DIAGNOSIS — S82.872D CLOSED DISPLACED PILON FRACTURE OF LEFT TIBIA WITH ROUTINE HEALING, SUBSEQUENT ENCOUNTER: ICD-10-CM

## 2024-09-30 PROCEDURE — 4010F ACE/ARB THERAPY RXD/TAKEN: CPT | Mod: CPTII,,, | Performed by: REHABILITATION UNIT

## 2024-09-30 PROCEDURE — 3008F BODY MASS INDEX DOCD: CPT | Mod: CPTII,,, | Performed by: REHABILITATION UNIT

## 2024-09-30 PROCEDURE — 1159F MED LIST DOCD IN RCRD: CPT | Mod: CPTII,,, | Performed by: REHABILITATION UNIT

## 2024-09-30 PROCEDURE — 99213 OFFICE O/P EST LOW 20 MIN: CPT | Mod: S$PBB,,, | Performed by: REHABILITATION UNIT

## 2024-09-30 PROCEDURE — 99213 OFFICE O/P EST LOW 20 MIN: CPT | Mod: PBBFAC

## 2024-09-30 PROCEDURE — 3078F DIAST BP <80 MM HG: CPT | Mod: CPTII,,, | Performed by: REHABILITATION UNIT

## 2024-09-30 PROCEDURE — 3044F HG A1C LEVEL LT 7.0%: CPT | Mod: CPTII,,, | Performed by: REHABILITATION UNIT

## 2024-09-30 PROCEDURE — 3074F SYST BP LT 130 MM HG: CPT | Mod: CPTII,,, | Performed by: REHABILITATION UNIT

## 2024-09-30 NOTE — PROGRESS NOTES
Faculty Attestation: Gilma Edmonds  was seen at [facility] in the Orthopaedic Clinic. Discussed with the resident at the time of the visit. History of Present Illness, Physical Exam, and Assessment and Plan reviewed. Treatment plan is reasonable and appropriate. Compliance with treatment recommendations is important. No procedure was performed.     Luis Andrews MD  Orthopaedic Surgery

## 2024-10-17 ENCOUNTER — HOSPITAL ENCOUNTER (OUTPATIENT)
Dept: RADIOLOGY | Facility: HOSPITAL | Age: 54
Discharge: HOME | End: 2024-10-17
Attending: NURSE PRACTITIONER
Payer: MEDICAID

## 2024-10-17 DIAGNOSIS — N28.1 RENAL CYST: ICD-10-CM

## 2024-10-17 PROCEDURE — 76770 US EXAM ABDO BACK WALL COMP: CPT | Mod: TC

## 2024-10-24 ENCOUNTER — TELEPHONE (OUTPATIENT)
Dept: INTERNAL MEDICINE | Facility: CLINIC | Age: 54
End: 2024-10-24
Payer: MEDICAID

## 2024-10-24 NOTE — TELEPHONE ENCOUNTER
----- Message from Emma Rea NP sent at 10/24/2024 12:33 PM CDT -----  Please let pt know kidney ultrasound results shows approx 2.6 cm cyst to right kidney without concerning features noted.

## 2024-10-24 NOTE — TELEPHONE ENCOUNTER
Contacted informed PCP viewed ultrasound results. A 2.6 cm cyst noted on right kidney without concerning features noted. She voiced understanding.

## 2024-10-30 ENCOUNTER — PROCEDURE VISIT (OUTPATIENT)
Dept: GASTROENTEROLOGY | Facility: CLINIC | Age: 54
End: 2024-10-30
Payer: MEDICAID

## 2024-10-30 DIAGNOSIS — K76.0 HEPATIC STEATOSIS: ICD-10-CM

## 2024-11-06 ENCOUNTER — OFFICE VISIT (OUTPATIENT)
Dept: ORTHOPEDICS | Facility: CLINIC | Age: 54
End: 2024-11-06
Payer: MEDICAID

## 2024-11-06 VITALS
DIASTOLIC BLOOD PRESSURE: 84 MMHG | BODY MASS INDEX: 22.55 KG/M2 | SYSTOLIC BLOOD PRESSURE: 120 MMHG | HEART RATE: 73 BPM | HEIGHT: 62 IN | TEMPERATURE: 98 F | WEIGHT: 122.56 LBS

## 2024-11-06 DIAGNOSIS — M25.422 EFFUSION OF LEFT ELBOW: ICD-10-CM

## 2024-11-06 DIAGNOSIS — M77.12 LATERAL EPICONDYLITIS OF LEFT ELBOW: Primary | ICD-10-CM

## 2024-11-06 PROCEDURE — 99215 OFFICE O/P EST HI 40 MIN: CPT | Mod: PBBFAC | Performed by: NURSE PRACTITIONER

## 2024-11-06 PROCEDURE — 20605 DRAIN/INJ JOINT/BURSA W/O US: CPT | Mod: PBBFAC | Performed by: NURSE PRACTITIONER

## 2024-11-06 RX ORDER — TRIAMCINOLONE ACETONIDE 40 MG/ML
40 INJECTION, SUSPENSION INTRA-ARTICULAR; INTRAMUSCULAR
Status: DISCONTINUED | OUTPATIENT
Start: 2024-11-06 | End: 2024-11-06 | Stop reason: HOSPADM

## 2024-11-06 RX ORDER — TRIAMCINOLONE ACETONIDE 40 MG/ML
40 INJECTION, SUSPENSION INTRA-ARTICULAR; INTRAMUSCULAR
Status: COMPLETED | OUTPATIENT
Start: 2024-11-06 | End: 2024-11-06

## 2024-11-06 RX ORDER — LIDOCAINE HYDROCHLORIDE 10 MG/ML
1 INJECTION, SOLUTION EPIDURAL; INFILTRATION; INTRACAUDAL; PERINEURAL
Status: COMPLETED | OUTPATIENT
Start: 2024-11-06 | End: 2024-11-06

## 2024-11-06 RX ADMIN — TRIAMCINOLONE ACETONIDE 40 MG: 40 INJECTION, SUSPENSION INTRA-ARTICULAR; INTRAMUSCULAR at 09:11

## 2024-11-06 RX ADMIN — LIDOCAINE HYDROCHLORIDE 1 ML: 10 INJECTION, SOLUTION EPIDURAL; INFILTRATION; INTRACAUDAL; PERINEURAL at 09:11
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
## 2024-11-12 ENCOUNTER — TELEPHONE (OUTPATIENT)
Dept: GASTROENTEROLOGY | Facility: CLINIC | Age: 54
End: 2024-11-12
Payer: MEDICAID

## 2024-11-12 NOTE — TELEPHONE ENCOUNTER
----- Message from ONEIL Hernandez sent at 11/5/2024  1:00 PM CST -----  Please notify FibroScan with F0, S1.  Plan for repeat in 1 year.  Thanks

## 2024-12-10 ENCOUNTER — OFFICE VISIT (OUTPATIENT)
Dept: INTERNAL MEDICINE | Facility: CLINIC | Age: 54
End: 2024-12-10
Payer: MEDICAID

## 2024-12-10 VITALS
HEART RATE: 75 BPM | DIASTOLIC BLOOD PRESSURE: 77 MMHG | RESPIRATION RATE: 20 BRPM | BODY MASS INDEX: 22.41 KG/M2 | HEIGHT: 62 IN | SYSTOLIC BLOOD PRESSURE: 113 MMHG | WEIGHT: 121.81 LBS | OXYGEN SATURATION: 95 % | TEMPERATURE: 98 F

## 2024-12-10 DIAGNOSIS — S32.040D COMPRESSION FRACTURE OF L4 VERTEBRA WITH ROUTINE HEALING: Primary | ICD-10-CM

## 2024-12-10 DIAGNOSIS — F17.210 CIGARETTE NICOTINE DEPENDENCE WITHOUT COMPLICATION: ICD-10-CM

## 2024-12-10 DIAGNOSIS — I25.10 CORONARY ARTERY DISEASE INVOLVING NATIVE CORONARY ARTERY OF NATIVE HEART WITHOUT ANGINA PECTORIS: ICD-10-CM

## 2024-12-10 DIAGNOSIS — N28.1 RENAL CYST: ICD-10-CM

## 2024-12-10 DIAGNOSIS — M51.360 DEGENERATION OF INTERVERTEBRAL DISC OF LUMBAR REGION WITH DISCOGENIC BACK PAIN: ICD-10-CM

## 2024-12-10 PROBLEM — Z12.11 SCREENING FOR COLON CANCER: Status: RESOLVED | Noted: 2024-09-09 | Resolved: 2024-12-10

## 2024-12-10 PROCEDURE — 1160F RVW MEDS BY RX/DR IN RCRD: CPT | Mod: CPTII,,, | Performed by: NURSE PRACTITIONER

## 2024-12-10 PROCEDURE — 3008F BODY MASS INDEX DOCD: CPT | Mod: CPTII,,, | Performed by: NURSE PRACTITIONER

## 2024-12-10 PROCEDURE — 1159F MED LIST DOCD IN RCRD: CPT | Mod: CPTII,,, | Performed by: NURSE PRACTITIONER

## 2024-12-10 PROCEDURE — 4010F ACE/ARB THERAPY RXD/TAKEN: CPT | Mod: CPTII,,, | Performed by: NURSE PRACTITIONER

## 2024-12-10 PROCEDURE — 99214 OFFICE O/P EST MOD 30 MIN: CPT | Mod: S$PBB,,, | Performed by: NURSE PRACTITIONER

## 2024-12-10 PROCEDURE — 3078F DIAST BP <80 MM HG: CPT | Mod: CPTII,,, | Performed by: NURSE PRACTITIONER

## 2024-12-10 PROCEDURE — 3044F HG A1C LEVEL LT 7.0%: CPT | Mod: CPTII,,, | Performed by: NURSE PRACTITIONER

## 2024-12-10 PROCEDURE — 99214 OFFICE O/P EST MOD 30 MIN: CPT | Mod: PBBFAC | Performed by: NURSE PRACTITIONER

## 2024-12-10 PROCEDURE — 3074F SYST BP LT 130 MM HG: CPT | Mod: CPTII,,, | Performed by: NURSE PRACTITIONER

## 2024-12-10 RX ORDER — FAMOTIDINE 40 MG/1
40 TABLET, FILM COATED ORAL DAILY
Qty: 90 TABLET | Refills: 3 | Status: SHIPPED | OUTPATIENT
Start: 2024-12-10 | End: 2025-12-10

## 2024-12-10 RX ORDER — IBUPROFEN 200 MG
1 TABLET ORAL DAILY
Qty: 12 PATCH | Refills: 0 | Status: SHIPPED | OUTPATIENT
Start: 2024-12-10

## 2024-12-10 RX ORDER — NICOTINE 7MG/24HR
1 PATCH, TRANSDERMAL 24 HOURS TRANSDERMAL DAILY
Qty: 12 PATCH | Refills: 0 | Status: SHIPPED | OUTPATIENT
Start: 2024-12-10

## 2024-12-10 RX ORDER — ALENDRONATE SODIUM 70 MG/1
70 TABLET ORAL
Qty: 4 TABLET | Refills: 11 | Status: SHIPPED | OUTPATIENT
Start: 2024-12-10 | End: 2025-12-10
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
## 2024-12-10 NOTE — ASSESSMENT & PLAN NOTE
Refer to DEXA/ CT lumbar spine results  Rx Fosamax to be started for treatment.   Calcium 1200 mg daily/ Vitamin D 3 2000 IU once daily   Fall precautions advised   OTC Tylenol as directed/ prn pain relief   Limit tobacco/ alcohol/ caffeine
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
## 2024-12-10 NOTE — ASSESSMENT & PLAN NOTE
Less, 0.5 ppd  Discussed for at least 3 minutes importance of smoking cessation and health benefits, including adverse effects to patient's health and organs.  Encouraged cessation.  Rx nicotine patches 14 mg and 7 mg dose as requested for refill from pt

## 2025-02-05 ENCOUNTER — HOSPITAL ENCOUNTER (OUTPATIENT)
Dept: RADIOLOGY | Facility: HOSPITAL | Age: 55
Discharge: HOME | End: 2025-02-05
Attending: ORTHOPAEDIC SURGERY
Payer: MEDICAID

## 2025-02-05 ENCOUNTER — OFFICE VISIT (OUTPATIENT)
Dept: ORTHOPEDICS | Facility: CLINIC | Age: 55
End: 2025-02-05
Payer: MEDICAID

## 2025-02-05 VITALS
BODY MASS INDEX: 23.19 KG/M2 | SYSTOLIC BLOOD PRESSURE: 121 MMHG | DIASTOLIC BLOOD PRESSURE: 86 MMHG | TEMPERATURE: 98 F | HEIGHT: 62 IN | WEIGHT: 126 LBS | HEART RATE: 93 BPM

## 2025-02-05 DIAGNOSIS — S82.892A FX ANKLE, LEFT, CLOSED, INITIAL ENCOUNTER: ICD-10-CM

## 2025-02-05 DIAGNOSIS — S82.892A FX ANKLE, LEFT, CLOSED, INITIAL ENCOUNTER: Primary | ICD-10-CM

## 2025-02-05 PROCEDURE — 73610 X-RAY EXAM OF ANKLE: CPT | Mod: TC,LT

## 2025-02-05 PROCEDURE — 99213 OFFICE O/P EST LOW 20 MIN: CPT | Mod: PBBFAC,25

## 2025-02-05 NOTE — PROGRESS NOTES
[facility]  Established Patient Office Visit  02/05/2025       Patient ID: Gilma Edmonds  YOB: 1970  MRN: [id number]    Chief Complaint: Post-op Evaluation and Follow-up of the Left Ankle. Sx 4/30/24. Patient states she is still having a little pain the pain goes from the left ankle to the left shin, causes her to limp sometimes. Takes ibuprofen/tylenol for pain as needed, Did not attend PT but does at home exercises for left ankle walking and stretching wise.      Orthopaedic Injuries:   Remote hx L ankle medial mal fx w/ retained hardware   2.   L pilon fracture dislocation, associated distal fibula fx (DOI 4/6/23)     Orthopaedic Surgeries:   1. Left ankle hardware removal, ORIF pilon- 4/30/24 Shamar    HPI:  Gilma Edmonds is a 54 y.o. female who is a history of a remote left medial malleolus fracture treated operatively; she subsequently sustained a left pilon fracture and underwent ORIF of left pilon and medial malleolus hardware removal on 04/30/2024.    Patient last seen in clinic 4 months ago.  She was transitioned to weight-bearing as tolerated in normal shoe at that time.  She complains of occasional pain in the shin with ambulation. She is taking Tylenol/ibuprofen for pain.  She is very happy with her ankle range of motion.  Had some plantar fasciitis in the foot but dry needling took care of it.  States that her pain is very manageable at this point and is pleased with her outcome.    Past Medical History:    Past Medical History:   Diagnosis Date    Arthritis     Coronary artery disease involving native coronary artery of native heart without angina pectoris 08/20/2024    NSTEMI (non-ST elevated myocardial infarction) 08/20/2024    Screening for colon cancer 09/09/2024     Past Surgical History:   Procedure Laterality Date    ANGIOGRAM, CORONARY, WITH LEFT HEART CATHETERIZATION N/A 8/16/2024    Procedure: Angiogram, Coronary, with Left Heart Cath;  Surgeon:  "Jared Cassidy MD;  Location: [facility] CATH LAB;  Service: Cardiology;  Laterality: N/A;    ANGIOGRAM, CORONARY, WITH LEFT HEART CATHETERIZATION N/A 2024    Procedure: Angiogram, Coronary, with Left Heart Cath;  Surgeon: Jared Abraham Jr., MD;  Location: [facility] CATH LAB;  Service: Cardiology;  Laterality: N/A;    ANKLE HARDWARE REMOVAL Left 2024    Procedure: REMOVAL, HARDWARE, ANKLE;  Surgeon: Jared Ibanez MD;  Location: [facility];  Service: Orthopedics;  Laterality: Left;     SECTION      1    left ankle surgery      OPEN REDUCTION AND INTERNAL FIXATION (ORIF) OF PILON FRACTURE Left 2024    Procedure: ORIF, FRACTURE, PILON;  Surgeon: Jared Ibanez MD;  Location: [facility];  Service: Orthopedics;  Laterality: Left;  Open "Broken" Schukla set/hold Screw Removal set    PERCUTANEOUS CORONARY INTERVENTION, ARTERY N/A 2024    Procedure: Percutaneous coronary intervention;  Surgeon: Jared Cassidy MD;  Location: [facility] CATH LAB;  Service: Cardiology;  Laterality: N/A;    Right habd and wrist scope       Family History   Problem Relation Name Age of Onset    Lung cancer Mother      Rheum arthritis Father      Diabetes Maternal Grandmother      Rheum arthritis Paternal Grandmother      Rheum arthritis Paternal Great-Grandmother       Social History     Socioeconomic History    Marital status: Single    Number of children: 2    Highest education level: High school graduate   Occupational History     Comment: unemployed   Tobacco Use    Smoking status: Some Days     Current packs/day: 0.50     Average packs/day: 0.5 packs/day for 35.8 years (17.9 ttl pk-yrs)     Types: Cigarettes     Start date: 1989    Smokeless tobacco: Never    Tobacco comments:     Smokes about 2 cigarettes a day. Using the patches   Substance and Sexual Activity    Alcohol use: Not Currently     Alcohol/week: 1.0 standard drink of alcohol     Types: 1 Glasses of wine per week     Comment: former daily alcohol " drinker x 1 month, stopped in april 2024    Drug use: Not Currently     Social Drivers of Health     Financial Resource Strain: Low Risk  (8/15/2024)    Overall Financial Resource Strain (CARDIA)     Difficulty of Paying Living Expenses: Not very hard   Food Insecurity: No Food Insecurity (8/19/2024)    Hunger Vital Sign     Worried About Running Out of Food in the Last Year: Never true     Ran Out of Food in the Last Year: Never true   Transportation Needs: No Transportation Needs (8/19/2024)    TRANSPORTATION NEEDS     Transportation : No   Physical Activity: Inactive (8/15/2024)    Exercise Vital Sign     Days of Exercise per Week: 0 days     Minutes of Exercise per Session: 0 min   Stress: No Stress Concern Present (8/15/2024)    [employer] - Occupational Stress Questionnaire     Feeling of Stress : Not at all   Housing Stability: Low Risk  (8/19/2024)    Housing Stability Vital Sign     Unable to Pay for Housing in the Last Year: No     Homeless in the Last Year: No     Medication List with Changes/Refills   Current Medications    ALENDRONATE (FOSAMAX) 70 MG TABLET    Take 1 tablet (70 mg total) by mouth every 7 days.    ASPIRIN 81 MG CHEW    Take 1 tablet (81 mg total) by mouth 2 (two) times a day.    CARVEDILOL (COREG) 3.125 MG TABLET    Take 1 tablet (3.125 mg total) by mouth 2 (two) times daily.    FAMOTIDINE (PEPCID) 40 MG TABLET    Take 1 tablet (40 mg total) by mouth once daily.    GABAPENTIN (NEURONTIN) 300 MG CAPSULE    Take 1 capsule (300 mg total) by mouth 3 (three) times daily.    NICOTINE (NICODERM CQ) 14 MG/24 HR    Place 1 patch onto the skin once daily. Change patch every 24 hours and rotate sites.    NICOTINE (NICODERM CQ) 7 MG/24 HR    Place 1 patch onto the skin once daily. Change patch every 24 hours and rotate sites.    ROSUVASTATIN (CRESTOR) 40 MG TAB    Take 1 tablet (40 mg total) by mouth every evening.    TICAGRELOR (BRILINTA) 90 MG TABLET    Take 1 tablet  (90 mg total) by mouth 2 (two) times daily.    VALSARTAN (DIOVAN) 80 MG TABLET    Take 1 tablet (80 mg total) by mouth once daily.     Review of patient's allergies indicates:   Allergen Reactions    Meloxicam Swelling    Sulfa (sulfonamide antibiotics) Hives     On tongue    Sulfamethoxazole-trimethoprim Hives     On tongue       Physical Exam:  Left Lower extremity:  Surgical incisions well healed  No gross deformity, open wounds, abrasions, effusions  Mild tenderness to palpation over her lateral malleolus.  Minimal tenderness to palpation over the medial distal tibia  Able to dorsiflex the foot to 15° past neutral with knee bent  60° of plantar flexion of the foot  Motor intact: EHL/FHL/TA/GSC  SILT: SP/DP/T/Garcia/Sa  Palpable DP pulse    Imaging independently interpreted:  X-rays of the left ankle obtained in clinic today demonstrate status post open reduction internal fixation of the left pilon fracture with no evidence of hardware complications; fracture line visible with increased callus formation and very small step-off of joint; healing Beyer A lateral malleolus fracture    Assessment and Plan:    Gilma Edmonds is a 54 y.o. female who is a history of a remote left medial malleolus fracture treated operatively; she subsequently sustained a left pilon fracture and underwent ORIF of left pilon and medial malleolus hardware removal on 04/30/2024    - Patient doing  well at this point  - She is ambulating in a normal shoe with minimal pain  - She is very happy with the ankle range of motion  - Continue over-the-counter anti-inflammatories as needed for pain  - She will come back to clinic on a p.r.n. basis if she has any problems or increased pain      Marin Sandhu MD PGY-3  [employer] Orthopaedic Surgery           Orders Placed This Encounter    X-Ray Ankle Complete Left

## 2025-02-05 NOTE — PROGRESS NOTES
Faculty Attestation: Gilma Edmonds  was seen at [facility] in the Orthopaedic Clinic in the outpatient department at a [facility]. Discussed with the resident at the time of the visit.  I participated in the management of the patient and was immediately available throughout the encounter. History of Present Illness, Physical Exam, and Assessment and Plan reviewed. Treatment plan is reasonable and appropriate. Compliance with treatment recommendations is important. No procedure was performed.     Brian Summers MD  Orthopedic Surgery Chief

## 2025-03-05 ENCOUNTER — OFFICE VISIT (OUTPATIENT)
Dept: ORTHOPEDICS | Facility: CLINIC | Age: 55
End: 2025-03-05
Payer: MEDICAID

## 2025-03-05 VITALS
WEIGHT: 126.56 LBS | BODY MASS INDEX: 23.29 KG/M2 | DIASTOLIC BLOOD PRESSURE: 79 MMHG | OXYGEN SATURATION: 95 % | RESPIRATION RATE: 15 BRPM | HEIGHT: 62 IN | SYSTOLIC BLOOD PRESSURE: 115 MMHG | TEMPERATURE: 98 F | HEART RATE: 100 BPM

## 2025-03-05 DIAGNOSIS — M77.12 LATERAL EPICONDYLITIS OF LEFT ELBOW: Primary | ICD-10-CM

## 2025-03-05 PROCEDURE — 99214 OFFICE O/P EST MOD 30 MIN: CPT | Mod: PBBFAC | Performed by: NURSE PRACTITIONER

## 2025-03-05 PROCEDURE — 1160F RVW MEDS BY RX/DR IN RCRD: CPT | Mod: CPTII,,, | Performed by: NURSE PRACTITIONER

## 2025-03-05 PROCEDURE — 3078F DIAST BP <80 MM HG: CPT | Mod: CPTII,,, | Performed by: NURSE PRACTITIONER

## 2025-03-05 PROCEDURE — 3074F SYST BP LT 130 MM HG: CPT | Mod: CPTII,,, | Performed by: NURSE PRACTITIONER

## 2025-03-05 PROCEDURE — 3008F BODY MASS INDEX DOCD: CPT | Mod: CPTII,,, | Performed by: NURSE PRACTITIONER

## 2025-03-05 PROCEDURE — 1159F MED LIST DOCD IN RCRD: CPT | Mod: CPTII,,, | Performed by: NURSE PRACTITIONER

## 2025-03-05 PROCEDURE — 99214 OFFICE O/P EST MOD 30 MIN: CPT | Mod: S$PBB,,, | Performed by: NURSE PRACTITIONER

## 2025-03-05 RX ORDER — TIZANIDINE 4 MG/1
4 TABLET ORAL 2 TIMES DAILY
COMMUNITY
Start: 2025-01-30

## 2025-03-05 NOTE — PROGRESS NOTES
Subjective:   PATIENT ID: Gilma Edmonds is a 54 y.o. female. Smoker. Employment HX: construction, currently employed.    Seen [facility] ortho for same DX since n/a.   Seen by ortho res for ankle fracture 2024 w/ ORIF.  CHIEF COMPLAINT: Follow-up of the Left Elbow    HPI:    Left aching lateral elbow pain. Right dominant   Injury: no HX of prior significant joint injury  Onset: several years ago worsening over time  Modifying Factors: worse with activity, improved with rest, and increased pain at PM  Associated Symptoms: decreased ROM and difficulty sleeping s/t pain  Activity: sedentary with light activity and pain moderately interferes with ADLs .   Previous Treatments: HEP , CSI since 2024 last injection 11/6/24, and RX Norco, gabapentin, methocarbamol,  with significant relief.  PMH: smoker, CVD, age over 49 yo, and hepatic steatosis  Family History: + OA    NOTE: Follow up, seen by me since 2024  for left lateral epicondylitis  .  Conservative treatments providing adequate relief at this time.  CSI given 11/6/24 with adequate relief.  Symptoms are not affecting ADLs.     Current Outpatient Medications:     alendronate (FOSAMAX) 70 MG tablet, Take 1 tablet (70 mg total) by mouth every 7 days., Disp: 4 tablet, Rfl: 11    carvediloL (COREG) 3.125 MG tablet, Take 1 tablet (3.125 mg total) by mouth 2 (two) times daily., Disp: 180 tablet, Rfl: 3    famotidine (PEPCID) 40 MG tablet, Take 1 tablet (40 mg total) by mouth once daily., Disp: 90 tablet, Rfl: 3    gabapentin (NEURONTIN) 300 MG capsule, Take 1 capsule (300 mg total) by mouth 3 (three) times daily., Disp: 90 capsule, Rfl: 11    nicotine (NICODERM CQ) 14 mg/24 hr, Place 1 patch onto the skin once daily. Change patch every 24 hours and rotate sites., Disp: 12 patch, Rfl: 0    nicotine (NICODERM CQ) 7 mg/24 hr, Place 1 patch onto the skin once daily. Change patch every 24 hours and rotate sites., Disp: 12 patch, Rfl: 0    rosuvastatin (CRESTOR) 40 MG Tab, Take 1  "tablet (40 mg total) by mouth every evening., Disp: 90 tablet, Rfl: 3    ticagrelor (BRILINTA) 90 mg tablet, Take 1 tablet (90 mg total) by mouth 2 (two) times daily., Disp: 60 tablet, Rfl: 11    tiZANidine (ZANAFLEX) 4 MG tablet, Take 4 mg by mouth 2 (two) times daily., Disp: , Rfl:     valsartan (DIOVAN) 80 MG tablet, Take 1 tablet (80 mg total) by mouth once daily., Disp: 90 tablet, Rfl: 3    aspirin 81 MG Chew, Take 1 tablet (81 mg total) by mouth 2 (two) times a day., Disp: 84 tablet, Rfl: 0  No current facility-administered medications for this visit.    Facility-Administered Medications Ordered in Other Visits:     lactated ringers infusion, , Intravenous, Continuous, Evette Quintero MD, Last Rate: 10 mL/hr at 04/30/24 1046, Restarted at 04/30/24 1236  Review of patient's allergies indicates:   Allergen Reactions    Meloxicam Swelling    Sulfa (sulfonamide antibiotics) Hives     On tongue    Sulfamethoxazole-trimethoprim Hives     On tongue     Hemoglobin A1c   Date Value Ref Range Status   07/19/2024 4.9 <=7.0 % Final      Body mass index is 23.15 kg/m².   Vitals:    03/05/25 1133   BP: 115/79   Pulse: 100   Resp: 15   Temp: 98.1 °F (36.7 °C)   TempSrc: Oral   SpO2: 95%   Weight: 57.4 kg (126 lb 8.7 oz)   Height: 5' 2" (1.575 m)   PainSc:   2   PainLoc: Elbow      REVIEW OF SYSTEMS:  A ten-point review of systems was performed and is negative, except as mentioned above   Objective:   MSK Bilateral Elbow  General:  no apparent distress, no pain indicators, obesity                                                                                                                Inspection: no masses/cyst/nodules, no swelling, no erythema, no contusion, no deconditioning, no deformity, no contracture, no scars, no soft tissue swelling lateral elbow, no erythema and no masses.   Palpation:  no tenderness lateral epicondyl increased with wrist extension, no crepitus and normal temperature  ROM:    Passive " Flexion/Extension  movement full and smooth non-painful (R)  movement full and smooth non-painful (L)  Supination   movement full and smooth non-painful (R)  movement full and smooth non-painful (L)  Pronation    movement full and smooth non-painful (R) movement full and smooth non-painful (L)  Strength:   Resisted Flexion    5 / 5 (R)   5 / 5 (L)  Resisted Extension  5 / 5 (R)   5 / 5 (L)   Strength  5 / 5 (R)  5 / 5 (L)  Special Testing:  Bicep Tendon  Hook Test  -- (R)  -- (L)  Melchor Sign  -- (R)  -- (L)  Tennis Elbow  LE Test  -- (R)  -- (L)  Golfer's Elbow  ME Test   -- (R)  -- (L)  Radial Tunnel Syndrome  Tinels Sign   -- (R)  -- (L)  Hand Exam normal  Shoulder Exam normal  Neurovascular: Intact to light touch  Neuro/ Psych: Awake, alert, oriented, normal mood and affect  Lymphatic: No LAD  Skin/ Soft Tissue: no rash, skin intact  Assessment:   IMAGING: X-ray 3 views of left elbow dated 7/24/24 reviewed and independently interpreted by me.  Discussed with patient. Noted no acute, DJD noted.    EXAMINATION: XR ELBOW COMPLETE 3 VIEW LEFT 7/24/24  CLINICAL HISTORY:  Left elbow pain  TECHNIQUE:  AP, lateral, and oblique views of the left elbow were performed.  COMPARISON:  None.  FINDINGS:  No acute displaced fracture or dislocation.  Small medial and lateral epicondylar spurs noted.  There is a small posterior fat pad.  There is mildly prominent anterior fat pad.  Findings suggestive of small joint effusion.  No radiopaque foreign body noted.  Impression:  1. Small joint effusion without acute displaced fracture identified.    EMR REVIEW: completed with noted prior visit 11/6/24 reviewed.    DIAGNOSIS:  1. Lateral epicondylitis of left elbow    2. BMI 23.0-23.9, adult      Plan:      Ongoing education about DX and treatment recommendations including conservative treatments of daily HEP, joint protection and OTC NSAID (unless contraindicated) as needed according to label instructions if able to tolerate.    Treatment plan: Moderate exacerbation of chronic Left Lateral Epicondylitis (Tennis Elbow) and with elbow joint effusion and decreased ROM  Patient with complete symptom relief at this time.  Encouraged to continue with HEP regularly and f/u with PCP.  Procedure: n/a  RX Medications: continue medications as RX per PCP      RTC: PRN if symptoms worsen or return       Griselda Irvingsdilia [facility]  Procedure Note:   None  Time Based Billing   Total Time Spent with Patient: 30 minutes .  Visit Start Time: 1200  10 minutes spent prior to visit reviewing EMR, prior labs and x-rays.  10 minutes spent in visit with patient face-to-face time completing exam, obtaining history, educating on DX and treatment plan.  10 minutes spent after visit completing EMR documentation.   Visit End Time: 1230    Please be aware that this note has been generated with the assistance of [employer] voice-to-text.  Please excuse any spelling or grammatical errors.

## 2025-03-13 ENCOUNTER — OFFICE VISIT (OUTPATIENT)
Dept: URGENT CARE | Facility: CLINIC | Age: 55
End: 2025-03-13
Payer: MEDICAID

## 2025-03-13 VITALS
HEIGHT: 62 IN | WEIGHT: 123 LBS | DIASTOLIC BLOOD PRESSURE: 77 MMHG | BODY MASS INDEX: 22.63 KG/M2 | TEMPERATURE: 98 F | SYSTOLIC BLOOD PRESSURE: 111 MMHG | HEART RATE: 91 BPM | OXYGEN SATURATION: 95 % | RESPIRATION RATE: 18 BRPM

## 2025-03-13 DIAGNOSIS — H60.502 ACUTE OTITIS EXTERNA OF LEFT EAR, UNSPECIFIED TYPE: Primary | ICD-10-CM

## 2025-03-13 PROCEDURE — 99203 OFFICE O/P NEW LOW 30 MIN: CPT | Mod: ,,, | Performed by: FAMILY MEDICINE

## 2025-03-13 RX ORDER — OFLOXACIN 3 MG/ML
10 SOLUTION AURICULAR (OTIC) DAILY
Qty: 10 ML | Refills: 0 | Status: SHIPPED | OUTPATIENT
Start: 2025-03-13 | End: 2025-03-20

## 2025-03-13 NOTE — PATIENT INSTRUCTIONS
Use antibiotic eardrops as prescribed.  Do not insert anything into the ear.  Avoid getting water in the ear until the problem clears up.  To ease the pain, use a warm washcloth against your ear.  May take over-the-counter Tylenol or Motrin as directed for pain as needed.  Call your doctor or seek immediate medical care if you develop new or higher fever, you have new or worsening ear pain, swelling, warmth, or redness around or behind your ear, if you develop new or increased pus or blood draining from your ear, or if you do not get better as expected.

## 2025-03-13 NOTE — PROGRESS NOTES
"Subjective:      Patient ID: Gilma Edmonds is a 54 y.o. female.    Vitals:  height is 5' 1.5" (1.562 m) and weight is 55.8 kg (123 lb). Her oral temperature is 98.3 °F (36.8 °C). Her blood pressure is 111/77 and her pulse is 91. Her respiration is 18 and oxygen saturation is 95%.     Chief Complaint: No chief complaint on file.     Patient is a 54 y.o. female who presents to urgent care with complaints of left ear pain x 3 days that radiates down to her neck. Alleviating factors include Tylenol with no relief. Patient denies cough and sinus congestion.  She denies ear drainage, or hearing changes.       Constitution: Negative for chills, sweating, fatigue and fever.   HENT:  Positive for ear pain. Negative for ear discharge, tinnitus, hearing loss, congestion, postnasal drip, sinus pain, sinus pressure and sore throat.    Neck: neck negative.   Cardiovascular: Negative.    Eyes: Negative.    Respiratory: Negative.     Gastrointestinal: Negative.    Endocrine: negative.   Genitourinary: Negative.    Musculoskeletal: Negative.    Skin: Negative.    Neurological:  Negative for disorientation and altered mental status.   Hematologic/Lymphatic: Negative.    Psychiatric/Behavioral:  Negative for altered mental status, disorientation and confusion.       Objective:     Physical Exam   Constitutional: She is oriented to person, place, and time. She appears well-developed. She is cooperative.  Non-toxic appearance. She does not appear ill. No distress.   HENT:   Head: Normocephalic and atraumatic.   Ears:   Right Ear: Hearing, tympanic membrane, external ear and ear canal normal.   Left Ear: Hearing and external ear normal. There is swelling and tenderness. No no drainage. Tympanic membrane is not perforated, not erythematous, not retracted and not bulging. No decreased hearing is noted.   Nose: Nose normal. No mucosal edema, rhinorrhea or nasal deformity. No epistaxis. Right sinus exhibits no maxillary sinus tenderness and " no frontal sinus tenderness. Left sinus exhibits no maxillary sinus tenderness and no frontal sinus tenderness.   Mouth/Throat: Uvula is midline, oropharynx is clear and moist and mucous membranes are normal. No trismus in the jaw. Normal dentition. No uvula swelling. No oropharyngeal exudate, posterior oropharyngeal edema or posterior oropharyngeal erythema.   Eyes: Conjunctivae and lids are normal. No scleral icterus.   Neck: Trachea normal and phonation normal. Neck supple. No edema present. No erythema present. No neck rigidity present.   Cardiovascular: Normal rate, regular rhythm, normal heart sounds and normal pulses.   Pulmonary/Chest: Effort normal and breath sounds normal. No respiratory distress. She has no decreased breath sounds. She has no rhonchi.   Abdominal: Normal appearance.   Musculoskeletal: Normal range of motion.         General: No deformity. Normal range of motion.   Neurological: She is alert and oriented to person, place, and time. She exhibits normal muscle tone. Coordination normal.   Skin: Skin is warm, dry, intact, not diaphoretic and not pale.   Psychiatric: Her speech is normal and behavior is normal. Judgment and thought content normal.   Nursing note and vitals reviewed.         Previous History      Review of patient's allergies indicates:   Allergen Reactions    Meloxicam Swelling    Sulfa (sulfonamide antibiotics) Hives     On tongue    Sulfamethoxazole-trimethoprim Hives     On tongue       Past Medical History:   Diagnosis Date    Arthritis     Coronary artery disease involving native coronary artery of native heart without angina pectoris 08/20/2024    NSTEMI (non-ST elevated myocardial infarction) 08/20/2024    Screening for colon cancer 09/09/2024     Current Outpatient Medications   Medication Instructions    alendronate (FOSAMAX) 70 mg, Oral, Every 7 days    aspirin 81 mg, Oral, 2 times daily    carvediloL (COREG) 3.125 mg, Oral, 2 times daily    famotidine (PEPCID) 40  "mg, Oral, Daily    gabapentin (NEURONTIN) 300 mg, Oral, 3 times daily    nicotine (NICODERM CQ) 14 mg/24 hr 1 patch, Transdermal, Daily, Change patch every 24 hours and rotate sites.    nicotine (NICODERM CQ) 7 mg/24 hr 1 patch, Transdermal, Daily, Change patch every 24 hours and rotate sites.    ofloxacin (FLOXIN) 0.3 % otic solution 10 drops, Left Ear, Daily    rosuvastatin (CRESTOR) 40 mg, Oral, Nightly    ticagrelor (BRILINTA) 90 mg, Oral, 2 times daily    tiZANidine (ZANAFLEX) 4 mg, 2 times daily    valsartan (DIOVAN) 80 mg, Oral, Daily     Past Surgical History:   Procedure Laterality Date    ANGIOGRAM, CORONARY, WITH LEFT HEART CATHETERIZATION N/A 2024    Procedure: Angiogram, Coronary, with Left Heart Cath;  Surgeon: Jared Cassidy MD;  Location: [facility] CATH LAB;  Service: Cardiology;  Laterality: N/A;    ANGIOGRAM, CORONARY, WITH LEFT HEART CATHETERIZATION N/A 2024    Procedure: Angiogram, Coronary, with Left Heart Cath;  Surgeon: Jared Abraham Jr., MD;  Location: [facility] CATH LAB;  Service: Cardiology;  Laterality: N/A;    ANKLE HARDWARE REMOVAL Left 2024    Procedure: REMOVAL, HARDWARE, ANKLE;  Surgeon: Jared Ibanez MD;  Location: [facility];  Service: Orthopedics;  Laterality: Left;     SECTION      1    left ankle surgery      OPEN REDUCTION AND INTERNAL FIXATION (ORIF) OF PILON FRACTURE Left 2024    Procedure: ORIF, FRACTURE, PILON;  Surgeon: Jared Ibanez MD;  Location: [facility];  Service: Orthopedics;  Laterality: Left;  Open "Broken" Schukla set/hold Screw Removal set    PERCUTANEOUS CORONARY INTERVENTION, ARTERY N/A 2024    Procedure: Percutaneous coronary intervention;  Surgeon: Jared Cassidy MD;  Location: [facility] CATH LAB;  Service: Cardiology;  Laterality: N/A;    Right habd and wrist scope       Family History   Problem Relation Name Age of Onset    Lung cancer Mother      Rheum arthritis Father      Diabetes Maternal Grandmother      Rheum " "arthritis Paternal Grandmother      Rheum arthritis Paternal Great-Grandmother         Social History[1]     Physical Exam      Vital Signs Reviewed   /77   Pulse 91   Temp 98.3 °F (36.8 °C) (Oral)   Resp 18   Ht 5' 1.5" (1.562 m)   Wt 55.8 kg (123 lb)   SpO2 95%   BMI 22.86 kg/m²        Procedures    Procedures     Labs     Results for orders placed or performed in visit on 09/09/24   Actin (Smooth Muscle) Antibody (IgG)    Collection Time: 09/09/24 10:04 AM   Result Value Ref Range    F-Actin Ab, IgG 15.1 <20.0 (Negative) U   Alpha-1-Antitrypsin    Collection Time: 09/09/24 10:04 AM   Result Value Ref Range    Alpha-1-Antitrypsin, S 155 100 - 190 mg/dL   Anti-Liver, Kidney, Microsome Ab    Collection Time: 09/09/24 10:04 AM   Result Value Ref Range    Liver-Kid Microsome-1 Ab, IgG by TIERRA 0.6 0.0 - 24.9 U   Antimitochondrial Antibody    Collection Time: 09/09/24 10:04 AM   Result Value Ref Range    Mitochondrial Ab, M2, S <0.1 <0.1 (Negative) U   ANTINUCLEAR ANTIBODIES    Collection Time: 09/09/24 10:04 AM   Result Value Ref Range    VINAYAK Screen Negative Negative    DSDNA Ab Quant <0.6 <10.0 IU/mL   APTT    Collection Time: 09/09/24 10:04 AM   Result Value Ref Range    PTT 30.9 23.2 - 33.7 seconds   Ceruloplasmin    Collection Time: 09/09/24 10:04 AM   Result Value Ref Range    Ceruloplasmin, S 32.1 20.0 - 51.0 mg/dL   Ferritin    Collection Time: 09/09/24 10:04 AM   Result Value Ref Range    Ferritin Level 53.97 4.63 - 204.00 ng/mL   Tissue Transglutaminase, IgA    Collection Time: 09/09/24 10:04 AM   Result Value Ref Range    Tissue Transglutaminase (tTG) Ab, IgA <1.02 0.00 - 4.99 FLU   Protime-INR    Collection Time: 09/09/24 10:04 AM   Result Value Ref Range    PT 12.5 11.4 - 14.0 seconds    INR 0.9 <=1.3   Iron and TIBC    Collection Time: 09/09/24 10:04 AM   Result Value Ref Range    Iron Binding Capacity Unsaturated 218 70 - 310 ug/dL    Iron Level 93 50 - 170 ug/dL    Transferrin 285 180 - 382 " mg/dL    Iron Binding Capacity Total 311 250 - 450 ug/dL    Iron Saturation 30 20 - 50 %      Assessment:     1. Acute otitis externa of left ear, unspecified type        Plan:   Use antibiotic eardrops as prescribed.  Do not insert anything into the ear.  Avoid getting water in the ear until the problem clears up.  To ease the pain, use a warm washcloth against your ear.  May take over-the-counter Tylenol or Motrin as directed for pain as needed.  Call your doctor or seek immediate medical care if you develop new or higher fever, you have new or worsening ear pain, swelling, warmth, or redness around or behind your ear, if you develop new or increased pus or blood draining from your ear, or if you do not get better as expected.     Acute otitis externa of left ear, unspecified type    Other orders  -     ofloxacin (FLOXIN) 0.3 % otic solution; Place 10 drops into the left ear once daily. for 7 days  Dispense: 10 mL; Refill: 0                         [1]   Social History  Tobacco Use    Smoking status: Some Days     Current packs/day: 0.50     Average packs/day: 0.5 packs/day for 35.9 years (17.9 ttl pk-yrs)     Types: Cigarettes     Start date: 4/24/1989    Smokeless tobacco: Never    Tobacco comments:     Smokes about 2 cigarettes a day. Using the patches   Substance Use Topics    Alcohol use: Not Currently     Alcohol/week: 1.0 standard drink of alcohol     Types: 1 Glasses of wine per week     Comment: former daily alcohol drinker x 1 month, stopped in april 2024    Drug use: Not Currently

## 2025-03-25 ENCOUNTER — HOSPITAL ENCOUNTER (OUTPATIENT)
Dept: RADIOLOGY | Facility: HOSPITAL | Age: 55
Discharge: HOME | End: 2025-03-25
Payer: MEDICAID

## 2025-03-25 ENCOUNTER — OFFICE VISIT (OUTPATIENT)
Dept: ORTHOPEDICS | Facility: CLINIC | Age: 55
End: 2025-03-25
Payer: MEDICAID

## 2025-03-25 VITALS
BODY MASS INDEX: 23.22 KG/M2 | SYSTOLIC BLOOD PRESSURE: 146 MMHG | HEIGHT: 61 IN | DIASTOLIC BLOOD PRESSURE: 98 MMHG | HEART RATE: 96 BPM | WEIGHT: 123 LBS

## 2025-03-25 DIAGNOSIS — M19.231 OTHER SECONDARY OSTEOARTHRITIS OF RIGHT WRIST: Primary | ICD-10-CM

## 2025-03-25 DIAGNOSIS — M19.131 SLAC (SCAPHOLUNATE ADVANCED COLLAPSE) WRIST, RIGHT: ICD-10-CM

## 2025-03-25 DIAGNOSIS — M25.531 PAIN IN RIGHT WRIST: ICD-10-CM

## 2025-03-25 PROCEDURE — 99213 OFFICE O/P EST LOW 20 MIN: CPT | Mod: PBBFAC,25

## 2025-03-25 PROCEDURE — 73110 X-RAY EXAM OF WRIST: CPT | Mod: TC,RT

## 2025-03-25 PROCEDURE — 20606 DRAIN/INJ JOINT/BURSA W/US: CPT | Mod: PBBFAC

## 2025-03-25 RX ORDER — TRIAMCINOLONE ACETONIDE 40 MG/ML
40 INJECTION, SUSPENSION INTRA-ARTICULAR; INTRAMUSCULAR ONCE
Status: COMPLETED | OUTPATIENT
Start: 2025-03-25 | End: 2025-03-25

## 2025-03-25 RX ORDER — LIDOCAINE HYDROCHLORIDE 10 MG/ML
1 INJECTION, SOLUTION EPIDURAL; INFILTRATION; INTRACAUDAL; PERINEURAL
Status: COMPLETED | OUTPATIENT
Start: 2025-03-25 | End: 2025-03-25

## 2025-03-25 RX ADMIN — LIDOCAINE HYDROCHLORIDE 10 MG: 10 INJECTION, SOLUTION EPIDURAL; INFILTRATION; INTRACAUDAL; PERINEURAL at 10:03

## 2025-03-25 RX ADMIN — TRIAMCINOLONE ACETONIDE 40 MG: 40 INJECTION, SUSPENSION INTRA-ARTICULAR; INTRAMUSCULAR at 10:03

## 2025-03-25 NOTE — PROGRESS NOTES
"  Subjective:   Patient ID: Gilma Edmonds is a right handed 54 y.o. female who presented to [facility] for new visit.    Chief Complaint: Pain of the Right Wrist    History of Present Illness:  Gilma Edmonds presents to the clinic today due to worsening right wrist pain to the ulnar aspect, acutely worsening over the last 3 days but has had intermittent flares of pain for many months now especially with changes in weather.  Pain is located over the dorsal and lateral aspect of the ulnar wrist, raking and 9/10 currently and she describes the pain as a constant aching, sharp and throbbing pain.  Pain is aggravated by all activities, reaching, lifting, carrying, ADL's, repetitive use. There is a history of wrist issues, patient had a wrist arthroscopy in 2010 although she was unable to remember the cause other than tendon rupture and a ganglion cyst removal. Evaluation to date: none. Treatment to date: avoidance of activity and oral analgesics. Expectations for today's visit includes further evaluation and possible injection. Occupation: former  and rob, currently works at a grocery store. PCP:  Emma Parisi NP.    Hand Review of Systems:  Swelling?  yes  Instability?  no  Clicking?  yes  Limited ROM? yes  Fever/Chills? no  Subluxation? no  Dislocation? no  Numbness/Tingling? no  Weakness? yes    Comorbid Conditions:  Current cigarette use (1PPD)    Objective:     Physical Exam:  BP (!) 146/98 Comment: Pt. did not take BP medication this morning.  Pulse 96   Ht 5' 1" (1.549 m)   Wt 55.8 kg (123 lb 0.3 oz)   BMI 23.24 kg/m²     Appearance:  Soft tissue swelling: Left: no Right: no  Effusion: Left:  Negative Right: Negative  Erythema: Left no Right: no  Ecchymosis: Left: no Right: no  Atrophy: Left: no Right: no    Palpation:  Hand/wrist Tenderness: Left: none  Right: ulnar wrist    Range of motion:  Flexion (0-80): Left:  80 Right: 10  Extension " (0-70): Left:  70 Right: 10  Ulnar deviation (0-30): 30 Right: 5  Radial deviation (0-20): 20 Right: 5    Strength:  Flexion: Left: 5/5 Pain: no Right: 3/5 Pain: yes  Extension: Left: 5/5 Pain: no Right: 3/5 Pain: yes  Supination: Left: 5/5 Pain: no Right: 4/5 Pain: yes  Pronation: Left: 5/5 Pain: no Right: 4/5 Pain: yes  Ulnar deviation: Left: 5/5 Pain: no Right: 3/5 Pain: yes  Radial deviation: Left: 5/5 Pain: no Right: 3/5 Pain: yes    Special Tests:  Durkans Test (Carpal Compression test): Left: Negative  Right: Negative  Tinels:  Left: Negative  Right: Negative    Phalens: Left: Negative  Right: Negative    Paisley Litler test:  Left: Negative  Right: Negative    UCL laxity: Left: Not performed  Right: Positive  FDP test: Left: Negative  Right: Negative  FDS test: Left: Negative  Right: Negative  Reverse Phalens: Left: Not performed Right: Not performed  Finkelstein's Test: Left: Negative Right: Negative    Froments: Left: Not performed Right: Not performed  Shuck Test: Left: Not performed Right: Not performed    AIN/PIN/Ulnar nerve: Intact and symmetric    General appearance: NAD  Peripheral pulses: normal bilaterally   Reflexes: Left: Not performed Right: Not performed   Sensation: normal    Labs:  Last A1c: The patient doesn't have any registry metric data available     Imaging:   Previous images reviewed.  X-rays ordered and performed today: yes  # of views: 3 Laterality: right  My Interpretation:  1st CMC joint space narrowing and 2nd MCP joint space narrowing with subluxation. Carpal row notes extensive joint space narrowing with radiocarpal and unlnocarapal joint space narrowing. Dorsal tilt of the lunate. Progressed compared to 7/24/24 XR.     Assessment:     Encounter Diagnoses   Code Name Primary?    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 Prior  external referring provider studies reviewed.   Dx: Extensive right wrist osteoarthritis, from patients HPI likely hx of SLAC wrist treated with arthroscopy - new problem  Treatment Plan: Discussed with patient diagnosis and treatment recommendations.   Discussed with the patient conservative treatment options at this time.  Recommend the patient begin OT and discussed cooling hand sleep.  Discussed CSI at this time, patient has not had great results with CSI as previously but has been many years since her last injection.  We will place a CSI today, please see procedure note below.  Discussed with the patient that if conservative treatment does not improve her pain we will likely refer the patient to orthopedic hand surgery for surgical evaluation.  Encouraged the patient to discontinue smoking.  Imaging: radiological studies ordered and independently reviewed; discussed with patient; pending radiologist interpretation.   Procedure: Discussed injection as a treatment option; discussed injections as treatment options; since conservative measures did not improve symptoms patient consented for CSI today.  Activity: Activity as tolerated  Therapy: Occupational Therapy  Medication: START over-the-counter acetaminophen (Tylenol 1000 mg three times per day as needed)  START over-the-counter NSAIDs (ibuprofen 200mg three tablets three times a day as needed). Please see your primary care physician for further refills.  RTC: 3 months.      Intermediate Joint Aspiration/Injection: R intercarpal    Date/Time: 3/25/2025 8:50 AM    Performed by: Larissa Aguilar MD  Authorized by: Larissa Aguilar MD    Consent Done?:  Yes (Written)  Indications:  Pain    Location:  Wrist  Site:  R intercarpal  Ultrasonic Guidance for needle placement: Yes  Images are saved and documented.    Needle size:  25 G  Patient tolerance:  Patient tolerated the procedure well with no immediate complications    Staff Attending: Sera Buchanan MD    Risks:   Possible complications with the injection include bleeding, infection (.01%), tendon rupture, steroid flare, fat pad or soft tissue atrophy, skin depigmentation, allergic reaction to medications and vasovagal response. (steroid flare treatment is rest, ice, NSAIDs and resolves in 24-36 hours.)    Consent:  No absolute contraindications (cellulitis overlying joint, infection, lack of informed consent, allergy to injection medication, AVN protein or egg allergy for sodium hyaluronate, or history of steroid flare) or relative contraindications (uncontrolled DM2 A1c>10, coagulopathy, INR > 3.5, previous joint replacement or history of AVN).        Description:  The patient was prepped in normal sterile fashion use of chlorhexidine scrub and the appropriate and anatomic landmarks were identified with ultrasound as image guidance. The patient was evaluated with a [employer] ultrasound machine using a hockey stick probe, following a standard protocol. Ultrasound imaging confirmed placement of the needle in the correct position, with reference to surrounding anatomic structures. Dynamic visualization of the needle was continuous throughout the procedure(s) and maintained good position. Care was taken to ensure there was unrestricted flow of syringe contents (listed below) into the site of injection. The ultrasound image for needle placement was captured and saved in the patient's medical record.       Indication for use of Ultrasound Guidance: The indication for the use of ultrasound was to ensure accurate localization of needle for aspiration and/or injection, and minimize risk of damage to surrounding structures. Ana EL, Jimmy S, Hermelinda LJ, All CHAVEZ. Improving injection accuracy of the elbow, knee, and shoulder: does injection site and imaging make a difference? A systematic review. Am J Sports Med. 2011 Mar;39(3):656-62. doi: 10.1177/3163469906200732. Epub 2011 Jan 21. PMID: 75996803.    Body mass index is 23.24  kg/m².    Contents of syringe included: 1mL of 1% lidocaine with 40mg of Kenalog (1mL of 40mg/mL)    Post Procedure: Patient alert, and moving all extremities. ROM improved, pain decreased.  Good peripheral pulses, no signs of vascular compromise and range of motion intact.  Aftercare instructions were given to patient at time of discharge.  Relative rest for 3 days-avoiding excess activity.  Place ice on the area for 15 minutes every 4-6 hours. Patient may take Tylenol a 1000 mg b.i.d. or ibuprofen 600 mg t.i.d. for the next 3-4 days if not on medication already and safe to take pending co-morbidities.  Protect the area for the next 1-8 hours if anesthetic was used.  Avoid excessive activity for the next 3-4 weeks.  ER precautions given for fever, severe joint pain or allergic reaction or other new symptoms related to the joint injection.         This note is dictated using the M*Modal Fluency Direct word recognition program. There are word recognition mistakes that are occasionally missed on review.     Larissa Aguilar MD  Sports Medicine Fellow

## 2025-03-27 NOTE — PROGRESS NOTES
Faculty Attestation: Gilma Edmonds  was seen in Sports Medicine Clinic. Services were furnished in a primary care sports medicine center in the outpatient department at a [facility]. Discussed with Dr. Aguilar at the time of the visit. History of Present Illness, Physical Exam, and Assessment and Plan reviewed.  I participated in the management of the patient and was immediately available throughout the encounter. Treatment plan is reasonable and appropriate. Compliance with treatment recommendations is important.    Radiology images independently reviewed and agree with radiologist interpretation. Procedure note reviewed. Patient tolerated procedure well.      Sera Buchanan MD  Sports Medicine      Insurance: Medicaid  Patient Type: New patient to [facility]  Problem Type: New condition to [facility]  Condition: New Condition

## 2025-06-24 ENCOUNTER — OFFICE VISIT (OUTPATIENT)
Dept: URGENT CARE | Facility: CLINIC | Age: 55
End: 2025-06-24
Payer: MEDICAID

## 2025-06-24 VITALS
TEMPERATURE: 99 F | HEART RATE: 106 BPM | BODY MASS INDEX: 22.66 KG/M2 | WEIGHT: 120 LBS | DIASTOLIC BLOOD PRESSURE: 85 MMHG | OXYGEN SATURATION: 96 % | RESPIRATION RATE: 18 BRPM | SYSTOLIC BLOOD PRESSURE: 121 MMHG | HEIGHT: 61 IN

## 2025-06-24 DIAGNOSIS — S01.20XA OPEN WOUND OF NOSE, UNSPECIFIED OPEN WOUND TYPE, INITIAL ENCOUNTER: Primary | ICD-10-CM

## 2025-06-24 PROCEDURE — 99213 OFFICE O/P EST LOW 20 MIN: CPT | Mod: ,,, | Performed by: FAMILY MEDICINE

## 2025-06-24 RX ORDER — CEPHALEXIN 500 MG/1
500 CAPSULE ORAL EVERY 8 HOURS
Qty: 21 CAPSULE | Refills: 0 | Status: SHIPPED | OUTPATIENT
Start: 2025-06-24 | End: 2025-07-01

## 2025-06-24 RX ORDER — MUPIROCIN 20 MG/G
OINTMENT TOPICAL 2 TIMES DAILY
Qty: 22 G | Refills: 0 | Status: SHIPPED | OUTPATIENT
Start: 2025-06-24 | End: 2025-07-01

## 2025-06-24 NOTE — PROGRESS NOTES
"Subjective:      Patient ID: Gilma Edmonds is a 54 y.o. female.    Vitals:  height is 5' 1" (1.549 m) and weight is 54.4 kg (120 lb). Her temperature is 98.5 °F (36.9 °C). Her blood pressure is 121/85 and her pulse is 106. Her respiration is 18 and oxygen saturation is 96%.     Chief Complaint: Eye Problem     Patient is a 54 y.o. female who presents to urgent care with complaints of sore on nose and surrounding swelling Patient denies fever or drainage .  Denies visual changes or eye pain.  She believes that there was a pimple forming to the bridge of her nose, that she picked and peeled the skin off.     Eye Problem   Pertinent negatives include no fever.     Constitution: Negative for chills, sweating, fatigue and fever.   HENT: Negative.     Neck: neck negative.   Cardiovascular: Negative.    Eyes: Negative.    Respiratory: Negative.     Gastrointestinal: Negative.    Endocrine: negative.   Genitourinary: Negative.    Musculoskeletal: Negative.    Skin:  Positive for abrasion. Negative for erythema.   Allergic/Immunologic: Negative.    Hematologic/Lymphatic: Negative.    Psychiatric/Behavioral: Negative.        Objective:     Physical Exam   Constitutional: She is oriented to person, place, and time. She appears well-developed.   HENT:   Head: Normocephalic and atraumatic. Head is without abrasion, without contusion and without laceration.   Ears:   Right Ear: External ear normal.   Left Ear: External ear normal.   Nose: Nose normal.   Mouth/Throat: Oropharynx is clear and moist and mucous membranes are normal.   Eyes: Conjunctivae, EOM and lids are normal. Pupils are equal, round, and reactive to light.   Neck: Trachea normal and phonation normal. Neck supple.   Cardiovascular: Normal rate, regular rhythm and normal heart sounds.   Pulmonary/Chest: Effort normal and breath sounds normal. No stridor. No respiratory distress.   Musculoskeletal: Normal range of motion.         General: Normal range of motion. "   Neurological: She is alert and oriented to person, place, and time.   Skin: Skin is warm, dry, intact and no rash. Capillary refill takes less than 2 seconds. No abrasion, No burn, No bruising, No erythema and No ecchymosis         Comments: There are 2 circular superficial abrasions to the bridge of the nose towards the right side, there is some surrounding swelling that extends to the right inner canthus of the eye, there is no drainage, no pustule   Psychiatric: Her speech is normal and behavior is normal. Judgment and thought content normal.   Nursing note and vitals reviewed.         Previous History      Review of patient's allergies indicates:   Allergen Reactions    Meloxicam Swelling    Sulfa (sulfonamide antibiotics) Hives     On tongue    Sulfamethoxazole-trimethoprim Hives     On tongue       Past Medical History:   Diagnosis Date    Arthritis     Coronary artery disease involving native coronary artery of native heart without angina pectoris 08/20/2024    NSTEMI (non-ST elevated myocardial infarction) 08/20/2024    Screening for colon cancer 09/09/2024     Current Outpatient Medications   Medication Instructions    alendronate (FOSAMAX) 70 mg, Oral, Every 7 days    aspirin 81 mg, Oral, 2 times daily    carvediloL (COREG) 3.125 mg, Oral, 2 times daily    cephALEXin (KEFLEX) 500 mg, Oral, Every 8 hours    famotidine (PEPCID) 40 mg, Oral, Daily    gabapentin (NEURONTIN) 300 mg, Oral, 3 times daily    mupirocin (BACTROBAN) 2 % ointment Topical (Top), 2 times daily    nicotine (NICODERM CQ) 14 mg/24 hr 1 patch, Transdermal, Daily, Change patch every 24 hours and rotate sites.    nicotine (NICODERM CQ) 7 mg/24 hr 1 patch, Transdermal, Daily, Change patch every 24 hours and rotate sites.    rosuvastatin (CRESTOR) 40 mg, Oral, Nightly    ticagrelor (BRILINTA) 90 mg, Oral, 2 times daily    tiZANidine (ZANAFLEX) 4 mg, 2 times daily    valsartan (DIOVAN) 80 mg, Oral, Daily     Past Surgical History:   Procedure  "Laterality Date    ANGIOGRAM, CORONARY, WITH LEFT HEART CATHETERIZATION N/A 2024    Procedure: Angiogram, Coronary, with Left Heart Cath;  Surgeon: Jared Cassidy MD;  Location: [facility] CATH LAB;  Service: Cardiology;  Laterality: N/A;    ANGIOGRAM, CORONARY, WITH LEFT HEART CATHETERIZATION N/A 2024    Procedure: Angiogram, Coronary, with Left Heart Cath;  Surgeon: Jared Abraham Jr., MD;  Location: [facility] CATH LAB;  Service: Cardiology;  Laterality: N/A;    ANKLE HARDWARE REMOVAL Left 2024    Procedure: REMOVAL, HARDWARE, ANKLE;  Surgeon: Jared Ibanez MD;  Location: [facility] OR;  Service: Orthopedics;  Laterality: Left;     SECTION      1    left ankle surgery      OPEN REDUCTION AND INTERNAL FIXATION (ORIF) OF PILON FRACTURE Left 2024    Procedure: ORIF, FRACTURE, PILON;  Surgeon: Jared Ibanez MD;  Location: [facility];  Service: Orthopedics;  Laterality: Left;  Open "Broken" Schukla set/hold Screw Removal set    PERCUTANEOUS CORONARY INTERVENTION, ARTERY N/A 2024    Procedure: Percutaneous coronary intervention;  Surgeon: Jared Cassidy MD;  Location: [facility] CATH LAB;  Service: Cardiology;  Laterality: N/A;    Right habd and wrist scope       Family History   Problem Relation Name Age of Onset    Lung cancer Mother      Rheum arthritis Father      Diabetes Maternal Grandmother      Rheum arthritis Paternal Grandmother      Rheum arthritis Paternal Great-Grandmother         Social History[1]     Physical Exam      Vital Signs Reviewed   /85   Pulse 106   Temp 98.5 °F (36.9 °C)   Resp 18   Ht 5' 1" (1.549 m)   Wt 54.4 kg (120 lb)   SpO2 96%   BMI 22.67 kg/m²        Procedures    Procedures     Labs     Results for orders placed or performed in visit on 24   Actin (Smooth Muscle) Antibody (IgG)    Collection Time: 24 10:04 AM   Result Value Ref Range    F-Actin Ab, IgG 15.1 <20.0 (Negative) U   Alpha-1-Antitrypsin    Collection Time: 24 10:04 " AM   Result Value Ref Range    Alpha-1-Antitrypsin, S 155 100 - 190 mg/dL   Anti-Liver, Kidney, Microsome Ab    Collection Time: 09/09/24 10:04 AM   Result Value Ref Range    Liver-Kid Microsome-1 Ab, IgG by TIERRA 0.6 0.0 - 24.9 U   Antimitochondrial Antibody    Collection Time: 09/09/24 10:04 AM   Result Value Ref Range    Mitochondrial Ab, M2, S <0.1 <0.1 (Negative) U   ANTINUCLEAR ANTIBODIES    Collection Time: 09/09/24 10:04 AM   Result Value Ref Range    VINAYAK Screen Negative Negative    DSDNA Ab Quant <0.6 <10.0 IU/mL   APTT    Collection Time: 09/09/24 10:04 AM   Result Value Ref Range    PTT 30.9 23.2 - 33.7 seconds   Ceruloplasmin    Collection Time: 09/09/24 10:04 AM   Result Value Ref Range    Ceruloplasmin, S 32.1 20.0 - 51.0 mg/dL   Ferritin    Collection Time: 09/09/24 10:04 AM   Result Value Ref Range    Ferritin Level 53.97 4.63 - 204.00 ng/mL   Tissue Transglutaminase, IgA    Collection Time: 09/09/24 10:04 AM   Result Value Ref Range    Tissue Transglutaminase (tTG) Ab, IgA <1.02 0.00 - 4.99 FLU   Protime-INR    Collection Time: 09/09/24 10:04 AM   Result Value Ref Range    PT 12.5 11.4 - 14.0 seconds    INR 0.9 <=1.3   Iron and TIBC    Collection Time: 09/09/24 10:04 AM   Result Value Ref Range    Iron Binding Capacity Unsaturated 218 70 - 310 ug/dL    Iron Level 93 50 - 170 ug/dL    Transferrin 285 180 - 382 mg/dL    Iron Binding Capacity Total 311 250 - 450 ug/dL    Iron Saturation 30 20 - 50 %      Assessment:     1. Open wound of nose, unspecified open wound type, initial encounter        Plan:   Medication sent to pharmacy  Do not apply ointment on or around the eye  Avoid wearing makeup or applying any skin care products to the open wound  Try to avoid wearing her glasses to allow the wound to heal  Keep the area clean and dry  Call or seek medical attention if you develop any new or worrisome symptoms that arise at home, or if you do not get better as expected    Open wound of nose,  unspecified open wound type, initial encounter    Other orders  -     cephALEXin (KEFLEX) 500 MG capsule; Take 1 capsule (500 mg total) by mouth every 8 (eight) hours. for 7 days  Dispense: 21 capsule; Refill: 0  -     mupirocin (BACTROBAN) 2 % ointment; Apply topically 2 (two) times daily. for 7 days  Dispense: 22 g; Refill: 0                         [1]   Social History  Tobacco Use    Smoking status: Some Days     Current packs/day: 0.50     Average packs/day: 0.5 packs/day for 36.2 years (18.1 ttl pk-yrs)     Types: Cigarettes     Start date: 4/24/1989     Passive exposure: Never    Smokeless tobacco: Never    Tobacco comments:     Smokes about 2 cigarettes a day. Using the patches   Substance Use Topics    Alcohol use: Not Currently     Alcohol/week: 1.0 standard drink of alcohol     Types: 1 Glasses of wine per week     Comment: former daily alcohol drinker x 1 month, stopped in april 2024    Drug use: Not Currently

## 2025-06-24 NOTE — LETTER
June 24, 2025      [facility]  [address]  [place] [address]  Phone: [phone]  Fax: [phone]       Patient: Glima Edmonds   YOB: 1970  Date of Visit: 06/24/2025    To Whom It May Concern:    Erika Edmonds  was at [facility] on 06/24/2025. Please excuse patient for 06/24/2025.  The patient may return to work/school on 06/25/2025. with no restrictions. If you have any questions or concerns, or if I can be of further assistance, please do not hesitate to contact me.    Sincerely,    Rivka Frankel CMA

## 2025-06-24 NOTE — PATIENT INSTRUCTIONS
Medication sent to pharmacy  Do not apply ointment on or around the eye  Avoid wearing makeup or applying any skin care products to the open wound  Try to avoid wearing her glasses to allow the wound to heal  Keep the area clean and dry  Call or seek medical attention if you develop any new or worrisome symptoms that arise at home, or if you do not get better as expected

## 2025-07-22 ENCOUNTER — OFFICE VISIT (OUTPATIENT)
Dept: ORTHOPEDICS | Facility: CLINIC | Age: 55
End: 2025-07-22
Payer: MEDICAID

## 2025-07-22 VITALS
BODY MASS INDEX: 22.64 KG/M2 | HEART RATE: 70 BPM | HEIGHT: 61 IN | OXYGEN SATURATION: 96 % | DIASTOLIC BLOOD PRESSURE: 66 MMHG | SYSTOLIC BLOOD PRESSURE: 122 MMHG | WEIGHT: 119.94 LBS

## 2025-07-22 DIAGNOSIS — M19.231 OTHER SECONDARY OSTEOARTHRITIS OF RIGHT WRIST: Primary | ICD-10-CM

## 2025-07-22 DIAGNOSIS — M19.131 SLAC (SCAPHOLUNATE ADVANCED COLLAPSE) WRIST, RIGHT: ICD-10-CM

## 2025-07-22 PROCEDURE — 99213 OFFICE O/P EST LOW 20 MIN: CPT | Mod: PBBFAC

## 2025-07-22 NOTE — PROGRESS NOTES
"Subjective:   Patient ID: Gilma Edmonds is a right handed 54 y.o. female who presented to [facility] for follow up.    Chief Complaint: Pain of the Right Wrist    History of Present Illness:  Gilma Edmonds presents to the clinic today for follow up of right wrist pain at the ulnar aspect.  She had a right wrist CSI at her last visit 4 months ago with great improvement of her ulnar-sided wrist pain she rates her pain a 2/10 today.  Since her CSI she has had to better ROM at the wrist as well. Pain is aggravated by all activities, reaching, lifting, carrying. There is a history of wrist issues, patient had a wrist arthroscopy in 2010 although she was unable to remember the cause other than tendon rupture and ganglion cyst removal. Evaluation to date:  Plain films. Treatment to date: avoidance of activity, oral analgesics, and CSI (3/25/25, with great relief). Expectations for today's visit includes follow up evaluation. 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 PCP: Emma Rea NP    Hand Review of Systems:  Swelling?  yes  Instability?  no  Clicking?  no  Limited ROM? yes  Fever/Chills? no  Subluxation? no  Dislocation? no  Numbness/Tingling? no  Weakness? no    Comorbid Conditions:  Current cigarette use (1 PPD)    Objective:     Physical Exam:  /66   Pulse 70   Ht 5' 1" (1.549 m)   Wt 54.4 kg (119 lb 14.9 oz)   SpO2 96%   BMI 22.66 kg/m²     Appearance:  Soft tissue swelling: Left: no Right: no  Effusion: Left:  Negative Right: Negative  Erythema: Left no Right: no  Ecchymosis: Left: no Right: no  Atrophy: Left: no Right: no    Palpation:  Hand/wrist Tenderness: Left: none  Right:  Ulnar wrist    Range of motion:  Flexion (0-80): Left:  80 Right: 20  Extension (0-70): Left:  70 Right: 20  Ulnar deviation (0-30): 30 Right: 5  Radial deviation (0-20): 20 Right: 5    Strength:  Flexion: Left: 5/5 Pain: no Right: " 4/5 Pain: no  Extension: Left: 5/5 Pain: no Right: 4/5 Pain: no  Supination: Left: 5/5 Pain: no Right: 4/5 Pain: no  Pronation: Left: 5/5 Pain: no Right: 4/5 Pain: no  Ulnar deviation: Left: 5/5 Pain: no Right: 4/5 Pain: no  Radial deviation: Left: 5/5 Pain: no Right: 4/5 Pain: no    AIN/PIN/Ulnar nerve: Intact and symmetric    General appearance: NAD  Peripheral pulses: normal bilaterally   Reflexes: Left: Not performed Right: Not performed   Sensation: normal    Labs:  Last A1c: 4.9     Imaging:   Previous images reviewed.  X-rays ordered and performed today: no    Right wrist XR on 03/25/2025, My interpretation: 1st CMC joint space narrowing and 2nd MCP joint space narrowing with subluxation.  Carpal row notes extensive joint space narrowing with radiocarpal and ulnocarpal joint space narrowing.  Dorsal tilt of the lunate.  Progressed compared to 7/24/24 XR.    Assessment:     Encounter Diagnoses   Code Name Primary?    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   Discussed with the patient conservative treatment options.  Her pain is greatly reduced since her intercarpal wrist injection at her last visit 03/25/2025, 2/10 in office today.  Again recommend the patient continue with formal OT.  We will have the patient follow up in 3 months to reassess, may consider a CSI at the time if her pain has returned/increased, at this time her pain is tolerable and we will forego CSI today.  If CSI does not improve the patient's pain in the future would recommend referral to orthopedic hand surgery for surgical evaluation, this would likely be a wrist fusion.  The patient is a current smoker therefore encouraged the  patient to discontinue smoking.  Follow up in 3 months.  Imaging: prior radiological studies independently reviewed; discussed with patient; agree with radiologist interpretation.   Procedure: Discussed injection as a treatment option; discussed injections as treatment options in future if conservative measures do not improve symptoms.  Activity: Activity as tolerated  Therapy: Occupational Therapy  Medication: CONTINUE over-the-counter acetaminophen (Tylenol 1000 mg three times per day as needed)  CONTINUE over-the-counter NSAIDs (ibuprofen 200mg three tablets three times a day as needed). Please see your primary care physician for further refills.  RTC: 3 months.      This note is dictated using the M*Modal Fluency Direct word recognition program. There are word recognition mistakes that are occasionally missed on review.     Larissa Aguilar MD  Sports Medicine Fellow

## 2025-07-24 NOTE — PROGRESS NOTES
Faculty Attestation: Gilma Edmonds  was seen in Sports Medicine Clinic. Services were furnished in a primary care sports medicine center in the outpatient department at a [facility]. Discussed with Dr. Aguilar at the time of the visit. History of Present Illness, Physical Exam, and Assessment and Plan reviewed.  I participated in the management of the patient and was immediately available throughout the encounter. Treatment plan is reasonable and appropriate. Compliance with treatment recommendations is important.    Radiology images independently reviewed and agree with radiologist interpretation. No procedure was performed.     Sera Buchanan MD  Sports Medicine      Insurance: Medicaid  Patient Type: Established patient to [facility]  Problem Type: Established condition to [facility]  Condition: Chronic Condition - stable and well controlled

## (undated) DEVICE — COVER CIV-FLEX PROBE US 58IN

## (undated) DEVICE — GLOVE SENSICARE PI GRN 6.5

## (undated) DEVICE — KIT MINI STK MAX COAX 5FR 10CM

## (undated) DEVICE — KIT BASIC ORTHO UNIVERSITY

## (undated) DEVICE — BIT DRILL LOCK SHORT 3.1X216MM

## (undated) DEVICE — DRAPE EXTREMITY STD 89X128IN

## (undated) DEVICE — SOCKINETTE IMPERVIOUS 12X48IN

## (undated) DEVICE — SUT BLK MONO 3-0 CUT 18IN F

## (undated) DEVICE — DRSNG POLYSKIN TRNSPAR 4X4.75

## (undated) DEVICE — MANIFOLD 4 PORT

## (undated) DEVICE — TUBING HP AIRLSS ROT ADPT 30IN

## (undated) DEVICE — KIT HAND CONTROL HIGH PRESSUR

## (undated) DEVICE — DRAPE STERI U-SHAPED 47X51IN

## (undated) DEVICE — CATH EAGLE EYE PLATINUM

## (undated) DEVICE — SPLINT PLASTER FAST SET 5X30IN

## (undated) DEVICE — GUIDE LAUNCHER 6FR JR 4.0 SH

## (undated) DEVICE — Device

## (undated) DEVICE — CATH JR4 5FR

## (undated) DEVICE — K-WIRE TROCAR POINT 1.6X150MM
Type: IMPLANTABLE DEVICE | Site: ANKLE | Status: NON-FUNCTIONAL
Removed: 2024-04-30

## (undated) DEVICE — INTRODUCER CATH 5F 11CM

## (undated) DEVICE — SUT ETHILON 3-0 FS-1 30

## (undated) DEVICE — PAD HEARTSTART DEFIB ADULT

## (undated) DEVICE — DEVICE MYNX CONTROL 5FR 10ML

## (undated) DEVICE — CATH NC EMERGE MR 3.5X20MM

## (undated) DEVICE — TOURNIQUET SB QC DP 34X4IN

## (undated) DEVICE — GLOVE SENSICARE NEOPRENE 6.5

## (undated) DEVICE — KIT SURGICAL TURNOVER

## (undated) DEVICE — DRAPE C-ARMOR EQUIPMENT COVER

## (undated) DEVICE — DEVICE BASIXCOMPAK INFL 20ML

## (undated) DEVICE — SHEATH INTRODUCER 6FR 11CM

## (undated) DEVICE — NDL BLUNT FILL 18G 1IN

## (undated) DEVICE — DRAPE INCISE IOBAN 2 23X17IN

## (undated) DEVICE — BNDG NS SWIFT WRP ELAS 6INX5YD

## (undated) DEVICE — GLOVE SIGNATURE ESSNTL LTX 7

## (undated) DEVICE — GLOVE SENSICARE PI GRN 7

## (undated) DEVICE — INTRODUCER TRNSRADIAL 6F 10CM

## (undated) DEVICE — DRAPE RADIAL BRACHIAL 29X42IN

## (undated) DEVICE — SPONGE GAUZE 4X4 12 PLY STRL

## (undated) DEVICE — DRESSING GAUZE XEROFORM 5X9

## (undated) DEVICE — GUIDEWIRE RUNTHROUGH EF 180CM

## (undated) DEVICE — BIT DRILL NON LOCK 2.5X216MM

## (undated) DEVICE — GLOVE SIGNATURE ESSNTL LTX 7.5

## (undated) DEVICE — PAD DEFIB CADENCE ADULT R2

## (undated) DEVICE — OMNIPAQUE 350MG 150ML VIAL

## (undated) DEVICE — SOL 9P NACL IRR PIC IL

## (undated) DEVICE — COVER PROXIMA MAYO STAND

## (undated) DEVICE — PAD ABDOMINAL STERILE 8X10IN

## (undated) DEVICE — CANNULA NASAL ADULT

## (undated) DEVICE — GOWN POLY REINF BRTH SLV XL

## (undated) DEVICE — CATH EMERGE MR 20 X 2.50

## (undated) DEVICE — GUIDEWIRE EMERALD 3MM 175X5CM

## (undated) DEVICE — COVER TABLE HVY DTY 60X90IN

## (undated) DEVICE — INTRO KIT MICPUNC STIFF CANN

## (undated) DEVICE — APPLICATOR CHLORAPREP ORN 26ML

## (undated) DEVICE — CATH JL4 5FR

## (undated) DEVICE — BANDAGE ESMARK ELASTIC ST 4X9

## (undated) DEVICE — BUCKET PLASTER DISPOSABLE

## (undated) DEVICE — CATH NC EMERGE MR 3X30MM

## (undated) DEVICE — DRAPE C-ARM COVER EZ 36X28IN

## (undated) DEVICE — PAD CAST 6X4YD SPECIALISTIC

## (undated) DEVICE — SUT CTD VICRYL 2.0